# Patient Record
Sex: MALE | Race: WHITE | NOT HISPANIC OR LATINO | Employment: FULL TIME | URBAN - NONMETROPOLITAN AREA
[De-identification: names, ages, dates, MRNs, and addresses within clinical notes are randomized per-mention and may not be internally consistent; named-entity substitution may affect disease eponyms.]

---

## 2017-08-28 DIAGNOSIS — E29.1 MALE HYPOGONADISM: ICD-10-CM

## 2017-08-28 DIAGNOSIS — Z12.5 SCREENING PSA (PROSTATE SPECIFIC ANTIGEN): ICD-10-CM

## 2017-08-28 DIAGNOSIS — E78.2 MIXED HYPERLIPIDEMIA: ICD-10-CM

## 2017-08-28 DIAGNOSIS — R53.82 CHRONIC FATIGUE: Primary | ICD-10-CM

## 2017-09-11 RX ORDER — IBUPROFEN 800 MG/1
TABLET ORAL
Qty: 90 TABLET | Refills: 1 | Status: SHIPPED | OUTPATIENT
Start: 2017-09-11 | End: 2018-03-26 | Stop reason: CLARIF

## 2017-10-02 ENCOUNTER — APPOINTMENT (OUTPATIENT)
Dept: GENERAL RADIOLOGY | Facility: HOSPITAL | Age: 52
End: 2017-10-02

## 2017-10-02 ENCOUNTER — HOSPITAL ENCOUNTER (EMERGENCY)
Facility: HOSPITAL | Age: 52
Discharge: HOME | End: 2017-10-02
Admitting: EMERGENCY MEDICINE

## 2017-10-02 VITALS
HEART RATE: 63 BPM | WEIGHT: 315 LBS | OXYGEN SATURATION: 95 % | TEMPERATURE: 98.3 F | SYSTOLIC BLOOD PRESSURE: 149 MMHG | DIASTOLIC BLOOD PRESSURE: 83 MMHG | HEIGHT: 78 IN | RESPIRATION RATE: 16 BRPM | BODY MASS INDEX: 36.45 KG/M2

## 2017-10-02 DIAGNOSIS — S93.401A SPRAIN OF RIGHT ANKLE, UNSPECIFIED LIGAMENT, INITIAL ENCOUNTER: Primary | ICD-10-CM

## 2017-10-02 PROCEDURE — 25010000002 DIPHENHYDRAMINE PER 50 MG: Performed by: PHYSICIAN ASSISTANT

## 2017-10-02 PROCEDURE — 73610 X-RAY EXAM OF ANKLE: CPT

## 2017-10-02 PROCEDURE — 25010000002 METHYLPREDNISOLONE PER 125 MG: Performed by: PHYSICIAN ASSISTANT

## 2017-10-02 PROCEDURE — 73630 X-RAY EXAM OF FOOT: CPT

## 2017-10-02 PROCEDURE — 96372 THER/PROPH/DIAG INJ SC/IM: CPT

## 2017-10-02 PROCEDURE — 72110 X-RAY EXAM L-2 SPINE 4/>VWS: CPT

## 2017-10-02 PROCEDURE — 99283 EMERGENCY DEPT VISIT LOW MDM: CPT

## 2017-10-02 PROCEDURE — 25010000002 KETOROLAC TROMETHAMINE PER 15 MG: Performed by: PHYSICIAN ASSISTANT

## 2017-10-02 PROCEDURE — 73562 X-RAY EXAM OF KNEE 3: CPT

## 2017-10-02 RX ORDER — KETOROLAC TROMETHAMINE 30 MG/ML
30 INJECTION, SOLUTION INTRAMUSCULAR; INTRAVENOUS ONCE
Status: COMPLETED | OUTPATIENT
Start: 2017-10-02 | End: 2017-10-02

## 2017-10-02 RX ORDER — METHYLPREDNISOLONE SODIUM SUCCINATE 125 MG/2ML
125 INJECTION, POWDER, LYOPHILIZED, FOR SOLUTION INTRAMUSCULAR; INTRAVENOUS ONCE
Status: COMPLETED | OUTPATIENT
Start: 2017-10-02 | End: 2017-10-02

## 2017-10-02 RX ORDER — DIPHENHYDRAMINE HYDROCHLORIDE 50 MG/ML
25 INJECTION INTRAMUSCULAR; INTRAVENOUS ONCE
Status: COMPLETED | OUTPATIENT
Start: 2017-10-02 | End: 2017-10-02

## 2017-10-02 RX ORDER — DICLOFENAC SODIUM 75 MG/1
75 TABLET, DELAYED RELEASE ORAL 2 TIMES DAILY
Qty: 14 TABLET | Refills: 0 | Status: ON HOLD | OUTPATIENT
Start: 2017-10-02 | End: 2019-11-25

## 2017-10-02 RX ADMIN — DIPHENHYDRAMINE HYDROCHLORIDE 25 MG: 50 INJECTION, SOLUTION INTRAMUSCULAR; INTRAVENOUS at 13:12

## 2017-10-02 RX ADMIN — METHYLPREDNISOLONE SODIUM SUCCINATE 125 MG: 125 INJECTION, POWDER, FOR SOLUTION INTRAMUSCULAR; INTRAVENOUS at 13:12

## 2017-10-02 RX ADMIN — KETOROLAC TROMETHAMINE 30 MG: 30 INJECTION, SOLUTION INTRAMUSCULAR at 11:23

## 2017-10-02 NOTE — ED PROVIDER NOTES
Subjective   History of Present Illness  52-year-old male presents following a fall.  The patient reports he stepped in a hole at work as experience right ankle, right knee and low back pain.  Denies loss range of motion or strength affected ankle but does not he has had significant swelling.  He has a history of torn ligament in the ankle before.  Review of Systems   All other systems reviewed and are negative.      History reviewed. No pertinent past medical history.    Allergies   Allergen Reactions   • Penicillins Shortness Of Breath   • Sulfa Antibiotics Hives   • Toradol [Ketorolac Tromethamine] Hives and Itching       History reviewed. No pertinent surgical history.    History reviewed. No pertinent family history.    Social History     Social History   • Marital status:      Spouse name: N/A   • Number of children: N/A   • Years of education: N/A     Social History Main Topics   • Smoking status: Former Smoker     Quit date: 10/2/2012   • Smokeless tobacco: None   • Alcohol use No   • Drug use: No   • Sexual activity: Not Asked     Other Topics Concern   • None     Social History Narrative   • None           Objective   Physical Exam   Constitutional: He is oriented to person, place, and time. He appears well-developed and well-nourished.   HENT:   Head: Normocephalic.   Eyes: Pupils are equal, round, and reactive to light.   Neck: Normal range of motion.   Cardiovascular: Normal rate and regular rhythm.    Pulmonary/Chest: Effort normal.   Abdominal: Soft.   Musculoskeletal:   Significant ankle swelling and tenderness to palpation while low back tenderness paraspinal muscles for extension and flexion of the knee with tenderness at the patella   Neurological: He is alert and oriented to person, place, and time.   Skin: Skin is warm.   Psychiatric: He has a normal mood and affect. His behavior is normal.   Nursing note and vitals reviewed.      Procedures         ED Course  ED Course   Value Comment By  Time   XR Spine Lumbar 4+ View (Reviewed) Luis Antonio Mccall PA-C 10/02 1243                  MDM  Number of Diagnoses or Management Options  Sprain of right ankle, unspecified ligament, initial encounter: new and requires workup  Diagnosis management comments: Reports improvement in pain but noted he was itching and felt warm all over after toradol.  Treated with benadryl and steroids which resolved this reaction. Will dc in stable condition and offer strong return precautions        Amount and/or Complexity of Data Reviewed  Tests in the radiology section of CPT®: reviewed and ordered    Risk of Complications, Morbidity, and/or Mortality  Presenting problems: moderate  Diagnostic procedures: moderate  Management options: moderate    Patient Progress  Patient progress: improved      Final diagnoses:   Sprain of right ankle, unspecified ligament, initial encounter            Luis Antonio Mccall PA-C  10/02/17 1435

## 2017-10-02 NOTE — ED NOTES
Patient reports itching to body in general. Patient moved back to intake room 14 and assessed. Red itchy bumps to patient torso and upper extremities. Luis Antonio notified. Orders received. Toradol added to patients allergy lisMatt Calvert RN  10/02/17 7042

## 2018-02-22 ENCOUNTER — OFFICE VISIT (OUTPATIENT)
Dept: INTERNAL MEDICINE | Age: 53
End: 2018-02-22
Payer: COMMERCIAL

## 2018-02-22 VITALS
SYSTOLIC BLOOD PRESSURE: 132 MMHG | OXYGEN SATURATION: 98 % | BODY MASS INDEX: 36.45 KG/M2 | HEIGHT: 78 IN | WEIGHT: 315 LBS | DIASTOLIC BLOOD PRESSURE: 80 MMHG | HEART RATE: 98 BPM

## 2018-02-22 DIAGNOSIS — Z23 NEED FOR TDAP VACCINATION: Primary | ICD-10-CM

## 2018-02-22 DIAGNOSIS — M19.079 OSTEOARTHRITIS OF ANKLE, UNSPECIFIED LATERALITY, UNSPECIFIED OSTEOARTHRITIS TYPE: ICD-10-CM

## 2018-02-22 DIAGNOSIS — E78.00 HYPERCHOLESTEREMIA: ICD-10-CM

## 2018-02-22 DIAGNOSIS — E11.9 TYPE 2 DIABETES MELLITUS WITHOUT COMPLICATION, WITH LONG-TERM CURRENT USE OF INSULIN (HCC): ICD-10-CM

## 2018-02-22 DIAGNOSIS — Z23 NEED FOR PNEUMOCOCCAL VACCINE: ICD-10-CM

## 2018-02-22 DIAGNOSIS — Z79.4 TYPE 2 DIABETES MELLITUS WITHOUT COMPLICATION, WITH LONG-TERM CURRENT USE OF INSULIN (HCC): ICD-10-CM

## 2018-02-22 DIAGNOSIS — E66.01 MORBID OBESITY DUE TO EXCESS CALORIES (HCC): ICD-10-CM

## 2018-02-22 PROCEDURE — 90472 IMMUNIZATION ADMIN EACH ADD: CPT | Performed by: INTERNAL MEDICINE

## 2018-02-22 PROCEDURE — 90670 PCV13 VACCINE IM: CPT | Performed by: INTERNAL MEDICINE

## 2018-02-22 PROCEDURE — 99215 OFFICE O/P EST HI 40 MIN: CPT | Performed by: INTERNAL MEDICINE

## 2018-02-22 PROCEDURE — 90471 IMMUNIZATION ADMIN: CPT | Performed by: INTERNAL MEDICINE

## 2018-02-22 PROCEDURE — 90715 TDAP VACCINE 7 YRS/> IM: CPT | Performed by: INTERNAL MEDICINE

## 2018-02-22 RX ORDER — RIVAROXABAN 10 MG/1
TABLET, FILM COATED ORAL
Refills: 0 | COMMUNITY
Start: 2018-02-02 | End: 2018-03-26 | Stop reason: CLARIF

## 2018-02-22 RX ORDER — HYDROCODONE BITARTRATE AND ACETAMINOPHEN 7.5; 325 MG/1; MG/1
TABLET ORAL
COMMUNITY
Start: 2018-01-18 | End: 2018-03-26 | Stop reason: CLARIF

## 2018-02-22 RX ORDER — CARISOPRODOL 350 MG/1
TABLET ORAL
Refills: 0 | COMMUNITY
Start: 2017-12-15 | End: 2018-03-26 | Stop reason: CLARIF

## 2018-02-22 ASSESSMENT — PATIENT HEALTH QUESTIONNAIRE - PHQ9
SUM OF ALL RESPONSES TO PHQ9 QUESTIONS 1 & 2: 0
SUM OF ALL RESPONSES TO PHQ QUESTIONS 1-9: 0
1. LITTLE INTEREST OR PLEASURE IN DOING THINGS: 0
2. FEELING DOWN, DEPRESSED OR HOPELESS: 0

## 2018-02-22 ASSESSMENT — ENCOUNTER SYMPTOMS
VOMITING: 0
BACK PAIN: 0
TROUBLE SWALLOWING: 0
ABDOMINAL PAIN: 0
SHORTNESS OF BREATH: 0
ABDOMINAL DISTENTION: 0
SINUS PRESSURE: 0
EYE ITCHING: 0
BLOOD IN STOOL: 0
WHEEZING: 0
SORE THROAT: 0
EYE DISCHARGE: 0
NAUSEA: 0

## 2018-02-22 NOTE — PROGRESS NOTES
in stool, nausea and vomiting. Endocrine: Negative for cold intolerance, heat intolerance and polydipsia. Genitourinary: Negative for flank pain, frequency, hematuria and urgency. Musculoskeletal: Negative for arthralgias, back pain and joint swelling. Skin: Negative for rash and wound. Allergic/Immunologic: Negative for environmental allergies and food allergies. Neurological: Negative for dizziness, tremors, syncope, weakness, numbness and headaches. Hematological: Negative for adenopathy. Psychiatric/Behavioral: Negative for agitation and hallucinations. The patient is not nervous/anxious. Objective:     /80   Pulse 98   Ht 6' 7\" (2.007 m)   Wt (!) 400 lb (181.4 kg)   SpO2 98%   BMI 45.06 kg/m²    Physical Exam   Constitutional: He is oriented to person, place, and time. He appears well-developed and well-nourished. No distress. HENT:   Head: Normocephalic and atraumatic. Right Ear: External ear normal.   Left Ear: External ear normal.   Nose: Nose normal.   Mouth/Throat: Oropharynx is clear and moist. No oropharyngeal exudate. Eyes: Conjunctivae and EOM are normal. Pupils are equal, round, and reactive to light. Right eye exhibits no discharge. Left eye exhibits no discharge. No scleral icterus. Neck: Normal range of motion. Neck supple. No JVD present. No tracheal deviation present. No thyromegaly present. Cardiovascular: Normal rate, regular rhythm, normal heart sounds and intact distal pulses. Exam reveals no gallop and no friction rub. No murmur heard. Pulmonary/Chest: Effort normal and breath sounds normal. No respiratory distress. He has no wheezes. He has no rales. Abdominal: Soft. Bowel sounds are normal. He exhibits no distension and no mass. There is no tenderness. There is no rebound and no guarding. Musculoskeletal: Normal range of motion. He exhibits no edema, tenderness or deformity. Lymphadenopathy:     He has no cervical adenopathy. Neurological: He is alert and oriented to person, place, and time. He has normal reflexes. No cranial nerve deficit. He exhibits normal muscle tone. Coordination normal.   Skin: Skin is warm and dry. No rash noted. He is not diaphoretic. No erythema. No pallor. Psychiatric: He has a normal mood and affect. His behavior is normal. Judgment and thought content normal.        Assessment:     1. Morbid obesity due to excess calories ([address]     2. Hypercholesteremia     3. Osteoarthritis of ankle, unspecified laterality, unspecified osteoarthritis type     4. Type 2 diabetes mellitus without complication, with long-term current use of insulin ([facility])  Hemoglobin A1C            Plan:      Obesity which is related to continue increase caloric intake he's drinking real soda doesn't really watch his diet and his blood sugars elevated so were going to get him to in to see Angelika Parmar and do some diabetic education and work on his weight. Hypercholesterol. He's not a goal here either and will need some dietary supplementation. He's not been able to exercise because of his ankle injury and once that is healed I think he can be more active. Osteoarthritis of the ankle after tear which is under Dr. French Jump care and is healing nicely. Type II diabetes mellitus which is not optimally controlled. I'm going to put him on diet and exercise regimen and sees a referral to Angelika Parmar and let her go over things with him and then I'll see him back in 3 months with a repeat A1c. He's going to get his Prevnar and a tetanus today and I'll see him in 3 months. Return in about 3 months (around 5/22/2018) for diabetes check, LAB 1 WEEK BEFORE APPOINTMENT. Patient given educational materials - see patient instructions. Discussed use, benefit, and side effects of prescribed medications. All patient questions answered. Pt voiced understanding. Reviewed health maintenance. Instructed to continue current medications, diet and exercise.

## 2018-02-22 NOTE — PROGRESS NOTES
Visit Information    Have you changed or started any medications since your last visit including any over-the-counter medicines, vitamins, or herbal medicines? no   Are you having any side effects from any of your medications? -  no  Have you stopped taking any of your medications? Is so, why? -  no    Have you seen any other physician or provider since your last visit? No  Have you had any other diagnostic tests since your last visit? No  Have you been seen in the emergency room and/or had an admission to a hospital since we last saw you? No  Have you had your routine dental cleaning in the past 6 months? no    Have you activated your [employer] account? If not, what are your barriers?  No: ref     Patient Care Team:  Dillon Campoverde MD as PCP - General (Internal Medicine)    Medical History Review  Past Medical, Family, and Social History reviewed and does not contribute to the patient presenting condition    Health Maintenance   Topic Date Due    HIV screen  07/02/1982    DTaP/Tdap/Td vaccine (1 - Tdap) 07/02/1986    Lipid screen  07/02/2007    Diabetes screen  07/02/2007    Shingles Vaccine (1 of 2 - 2 Dose Series) 07/02/2017    Colon cancer screen colonoscopy  07/02/2017    Flu vaccine (1) 09/01/2017

## 2018-03-26 ENCOUNTER — OFFICE VISIT (OUTPATIENT)
Dept: INTERNAL MEDICINE | Age: 53
End: 2018-03-26
Payer: COMMERCIAL

## 2018-03-26 VITALS
TEMPERATURE: 99.1 F | BODY MASS INDEX: 42.81 KG/M2 | WEIGHT: 315 LBS | SYSTOLIC BLOOD PRESSURE: 126 MMHG | DIASTOLIC BLOOD PRESSURE: 82 MMHG | HEART RATE: 81 BPM | OXYGEN SATURATION: 97 %

## 2018-03-26 DIAGNOSIS — J32.9 SINUSITIS, UNSPECIFIED CHRONICITY, UNSPECIFIED LOCATION: Primary | ICD-10-CM

## 2018-03-26 PROCEDURE — 99213 OFFICE O/P EST LOW 20 MIN: CPT | Performed by: INTERNAL MEDICINE

## 2018-03-26 RX ORDER — AZITHROMYCIN 1 G
1 PACKET (EA) ORAL ONCE
Qty: 1 PACKAGE | Refills: 0 | Status: SHIPPED | OUTPATIENT
Start: 2018-03-26 | End: 2018-03-26

## 2018-03-26 RX ORDER — BENZONATATE 200 MG/1
200 CAPSULE ORAL 3 TIMES DAILY PRN
Qty: 30 CAPSULE | Refills: 0 | Status: SHIPPED | OUTPATIENT
Start: 2018-03-26 | End: 2018-04-05

## 2018-03-26 RX ORDER — METHYLPREDNISOLONE 4 MG/1
TABLET ORAL
Qty: 1 KIT | Refills: 0 | Status: SHIPPED | OUTPATIENT
Start: 2018-03-26 | End: 2018-04-01

## 2018-03-26 ASSESSMENT — ENCOUNTER SYMPTOMS
ABDOMINAL PAIN: 0
EYE ITCHING: 0
NAUSEA: 0
ABDOMINAL DISTENTION: 0
SHORTNESS OF BREATH: 0
VOMITING: 0
SINUS PRESSURE: 1
EYE DISCHARGE: 0
COUGH: 1
SORE THROAT: 0
BLOOD IN STOOL: 0
TROUBLE SWALLOWING: 0
SINUS PAIN: 1
BACK PAIN: 0
WHEEZING: 0

## 2018-03-26 NOTE — PROGRESS NOTES
Visit Information    Have you changed or started any medications since your last visit including any over-the-counter medicines, vitamins, or herbal medicines? no   Are you having any side effects from any of your medications? -  no  Have you stopped taking any of your medications? Is so, why? -  yes - PER DR    Have you seen any other physician or provider since your last visit? Yes - Records Obtained  Have you had any other diagnostic tests since your last visit? No  Have you been seen in the emergency room and/or had an admission to a hospital since we last saw you? No  Have you had your routine dental cleaning in the past 6 months? no    Have you activated your [employer] account? If not, what are your barriers?  No: DECLINED     Patient Care Team:  Tabitha Don MD as PCP - General (Internal Medicine)    Medical History Review  Past Medical, Family, and Social History reviewed and does not contribute to the patient presenting condition    Health Maintenance   Topic Date Due    Diabetic foot exam  07/02/1977    Diabetic retinal exam  07/02/1977    HIV screen  07/02/1982    Diabetic microalbuminuria test  07/02/1985    Pneumococcal med risk (1 of 1 - PPSV23) 07/02/1986    Shingles Vaccine (1 of 2 - 2 Dose Series) 07/02/2017    Colon cancer screen colonoscopy  07/02/2017    Flu vaccine (1) 09/01/2017    A1C test (Diabetic or Prediabetic)  02/16/2019    Lipid screen  02/16/2019    DTaP/Tdap/Td vaccine (2 - Td) 02/22/2028
Negative for flank pain, frequency, hematuria and urgency. Musculoskeletal: Negative for arthralgias, back pain and joint swelling. Skin: Negative for rash and wound. Allergic/Immunologic: Negative for environmental allergies and food allergies. Neurological: Negative for dizziness, tremors, syncope, weakness, numbness and headaches. Hematological: Negative for adenopathy. Psychiatric/Behavioral: Negative for agitation and hallucinations. The patient is not nervous/anxious. Objective:     /82   Pulse 81   Temp 99.1 °F (37.3 °C)   Wt (!) 380 lb (172.4 kg)   SpO2 97%   BMI 42.81 kg/m²    Physical Exam   Constitutional: He is oriented to person, place, and time. He appears well-developed and well-nourished. No distress. HENT:   Head: Normocephalic and atraumatic. Right Ear: External ear normal.   Left Ear: External ear normal.   Nose: Nose normal.   Mouth/Throat: Oropharynx is clear and moist. No oropharyngeal exudate. Eyes: Conjunctivae and EOM are normal. Pupils are equal, round, and reactive to light. Right eye exhibits no discharge. Left eye exhibits no discharge. No scleral icterus. Neck: Normal range of motion. Neck supple. No JVD present. No tracheal deviation present. No thyromegaly present. Cardiovascular: Normal rate, regular rhythm, normal heart sounds and intact distal pulses. Exam reveals no gallop and no friction rub. No murmur heard. Pulmonary/Chest: Effort normal and breath sounds normal. No respiratory distress. He has no wheezes. He has no rales. Abdominal: Soft. Bowel sounds are normal. He exhibits no distension and no mass. There is no tenderness. There is no rebound and no guarding. Musculoskeletal: Normal range of motion. He exhibits no edema, tenderness or deformity. Lymphadenopathy:     He has no cervical adenopathy. Neurological: He is alert and oriented to person, place, and time. He has normal reflexes. No cranial nerve deficit.  He exhibits

## 2018-05-18 DIAGNOSIS — E78.2 MIXED HYPERLIPIDEMIA: ICD-10-CM

## 2018-05-18 DIAGNOSIS — E11.9 TYPE 2 DIABETES MELLITUS WITHOUT COMPLICATION, WITH LONG-TERM CURRENT USE OF INSULIN (HCC): ICD-10-CM

## 2018-05-18 DIAGNOSIS — Z12.5 SCREENING PSA (PROSTATE SPECIFIC ANTIGEN): ICD-10-CM

## 2018-05-18 DIAGNOSIS — R53.82 CHRONIC FATIGUE: ICD-10-CM

## 2018-05-18 DIAGNOSIS — Z79.4 TYPE 2 DIABETES MELLITUS WITHOUT COMPLICATION, WITH LONG-TERM CURRENT USE OF INSULIN (HCC): ICD-10-CM

## 2018-05-18 LAB
ALBUMIN SERPL-MCNC: 4.4 G/DL (ref 3.5–5.2)
ALP BLD-CCNC: 56 U/L (ref 40–130)
ALT SERPL-CCNC: 33 U/L (ref 5–41)
ANION GAP SERPL CALCULATED.3IONS-SCNC: 12 MMOL/L (ref 7–19)
AST SERPL-CCNC: 24 U/L (ref 5–40)
BASOPHILS ABSOLUTE: 0 K/UL (ref 0–0.2)
BASOPHILS RELATIVE PERCENT: 0.5 % (ref 0–1)
BILIRUB SERPL-MCNC: 0.5 MG/DL (ref 0.2–1.2)
BUN BLDV-MCNC: 15 MG/DL (ref 6–20)
CALCIUM SERPL-MCNC: 9.4 MG/DL (ref 8.6–10)
CHLORIDE BLD-SCNC: 100 MMOL/L (ref 98–111)
CHOLESTEROL, TOTAL: 182 MG/DL (ref 160–199)
CO2: 30 MMOL/L (ref 22–29)
CREAT SERPL-MCNC: 1 MG/DL (ref 0.5–1.2)
EOSINOPHILS ABSOLUTE: 0.1 K/UL (ref 0–0.6)
EOSINOPHILS RELATIVE PERCENT: 1 % (ref 0–5)
GFR NON-AFRICAN AMERICAN: >60
GLUCOSE BLD-MCNC: 97 MG/DL (ref 74–109)
HBA1C MFR BLD: 5.2 %
HCT VFR BLD CALC: 44.1 % (ref 42–52)
HDLC SERPL-MCNC: 42 MG/DL (ref 55–121)
HEMOGLOBIN: 15.1 G/DL (ref 14–18)
LDL CHOLESTEROL CALCULATED: 112 MG/DL
LYMPHOCYTES ABSOLUTE: 2 K/UL (ref 1.1–4.5)
LYMPHOCYTES RELATIVE PERCENT: 34.1 % (ref 20–40)
MCH RBC QN AUTO: 31.2 PG (ref 27–31)
MCHC RBC AUTO-ENTMCNC: 34.2 G/DL (ref 33–37)
MCV RBC AUTO: 91.1 FL (ref 80–94)
MONOCYTES ABSOLUTE: 0.5 K/UL (ref 0–0.9)
MONOCYTES RELATIVE PERCENT: 9.2 % (ref 0–10)
NEUTROPHILS ABSOLUTE: 3.2 K/UL (ref 1.5–7.5)
NEUTROPHILS RELATIVE PERCENT: 54.9 % (ref 50–65)
PDW BLD-RTO: 12.9 % (ref 11.5–14.5)
PLATELET # BLD: 206 K/UL (ref 130–400)
PMV BLD AUTO: 10.2 FL (ref 9.4–12.4)
POTASSIUM SERPL-SCNC: 4.1 MMOL/L (ref 3.5–5)
PROSTATE SPECIFIC ANTIGEN: 2.91 NG/ML (ref 0–4)
RBC # BLD: 4.84 M/UL (ref 4.7–6.1)
SODIUM BLD-SCNC: 142 MMOL/L (ref 136–145)
TOTAL PROTEIN: 7.1 G/DL (ref 6.6–8.7)
TRIGL SERPL-MCNC: 139 MG/DL (ref 0–149)
WBC # BLD: 5.9 K/UL (ref 4.8–10.8)

## 2018-05-29 ENCOUNTER — OFFICE VISIT (OUTPATIENT)
Dept: INTERNAL MEDICINE | Age: 53
End: 2018-05-29
Payer: COMMERCIAL

## 2018-05-29 VITALS
HEART RATE: 78 BPM | WEIGHT: 315 LBS | DIASTOLIC BLOOD PRESSURE: 84 MMHG | SYSTOLIC BLOOD PRESSURE: 138 MMHG | OXYGEN SATURATION: 98 % | HEIGHT: 78 IN | BODY MASS INDEX: 36.45 KG/M2

## 2018-05-29 DIAGNOSIS — E11.9 TYPE 2 DIABETES MELLITUS WITHOUT COMPLICATION, WITH LONG-TERM CURRENT USE OF INSULIN (HCC): Primary | ICD-10-CM

## 2018-05-29 DIAGNOSIS — E66.01 MORBID OBESITY DUE TO EXCESS CALORIES (HCC): ICD-10-CM

## 2018-05-29 DIAGNOSIS — Z79.4 TYPE 2 DIABETES MELLITUS WITHOUT COMPLICATION, WITH LONG-TERM CURRENT USE OF INSULIN (HCC): Primary | ICD-10-CM

## 2018-05-29 DIAGNOSIS — E78.00 HYPERCHOLESTEREMIA: ICD-10-CM

## 2018-05-29 PROCEDURE — 99214 OFFICE O/P EST MOD 30 MIN: CPT | Performed by: INTERNAL MEDICINE

## 2018-05-29 RX ORDER — IBUPROFEN 800 MG/1
TABLET ORAL
Refills: 1 | COMMUNITY
Start: 2018-05-17 | End: 2018-10-01

## 2018-05-29 RX ORDER — HYDROCODONE BITARTRATE AND ACETAMINOPHEN 7.5; 325 MG/1; MG/1
TABLET ORAL
Refills: 0 | COMMUNITY
Start: 2018-05-22 | End: 2018-10-01

## 2018-05-29 RX ORDER — TADALAFIL 5 MG/1
5 TABLET ORAL PRN
COMMUNITY
End: 2019-07-15

## 2018-05-29 RX ORDER — CLINDAMYCIN HYDROCHLORIDE 300 MG/1
CAPSULE ORAL
Refills: 0 | COMMUNITY
Start: 2018-05-22 | End: 2018-10-01

## 2018-05-29 ASSESSMENT — ENCOUNTER SYMPTOMS
SINUS PRESSURE: 0
BACK PAIN: 0
EYE DISCHARGE: 0
WHEEZING: 0
ABDOMINAL PAIN: 0
BLOOD IN STOOL: 0
VOMITING: 0
ABDOMINAL DISTENTION: 0
SORE THROAT: 0
SHORTNESS OF BREATH: 0
TROUBLE SWALLOWING: 0
NAUSEA: 0
EYE ITCHING: 0

## 2018-09-27 DIAGNOSIS — Z79.4 TYPE 2 DIABETES MELLITUS WITHOUT COMPLICATION, WITH LONG-TERM CURRENT USE OF INSULIN (HCC): ICD-10-CM

## 2018-09-27 DIAGNOSIS — E11.9 TYPE 2 DIABETES MELLITUS WITHOUT COMPLICATION, WITH LONG-TERM CURRENT USE OF INSULIN (HCC): ICD-10-CM

## 2018-09-27 LAB — HBA1C MFR BLD: 10.2 % (ref 4–6)

## 2018-10-01 ENCOUNTER — OFFICE VISIT (OUTPATIENT)
Dept: INTERNAL MEDICINE | Age: 53
End: 2018-10-01
Payer: COMMERCIAL

## 2018-10-01 VITALS
DIASTOLIC BLOOD PRESSURE: 84 MMHG | OXYGEN SATURATION: 99 % | HEART RATE: 72 BPM | WEIGHT: 315 LBS | HEIGHT: 78 IN | SYSTOLIC BLOOD PRESSURE: 122 MMHG | BODY MASS INDEX: 36.45 KG/M2

## 2018-10-01 DIAGNOSIS — E11.9 TYPE 2 DIABETES MELLITUS WITHOUT COMPLICATION, WITH LONG-TERM CURRENT USE OF INSULIN (HCC): Primary | ICD-10-CM

## 2018-10-01 DIAGNOSIS — M19.079 OSTEOARTHRITIS OF ANKLE, UNSPECIFIED LATERALITY, UNSPECIFIED OSTEOARTHRITIS TYPE: ICD-10-CM

## 2018-10-01 DIAGNOSIS — Z79.4 TYPE 2 DIABETES MELLITUS WITHOUT COMPLICATION, WITH LONG-TERM CURRENT USE OF INSULIN (HCC): Primary | ICD-10-CM

## 2018-10-01 DIAGNOSIS — E78.00 HYPERCHOLESTEREMIA: ICD-10-CM

## 2018-10-01 PROCEDURE — 99213 OFFICE O/P EST LOW 20 MIN: CPT | Performed by: INTERNAL MEDICINE

## 2018-10-01 RX ORDER — MELOXICAM 15 MG/1
15 TABLET ORAL DAILY
COMMUNITY
End: 2019-04-05 | Stop reason: SDUPTHER

## 2018-10-01 ASSESSMENT — ENCOUNTER SYMPTOMS
VOMITING: 0
SORE THROAT: 0
ABDOMINAL DISTENTION: 0
SINUS PRESSURE: 0
WHEEZING: 0
EYE ITCHING: 0
TROUBLE SWALLOWING: 0
BACK PAIN: 0
EYE DISCHARGE: 0
NAUSEA: 0
BLOOD IN STOOL: 0
SHORTNESS OF BREATH: 0
ABDOMINAL PAIN: 0

## 2018-10-01 NOTE — PROGRESS NOTES
nerve deficit. He exhibits normal muscle tone. Coordination normal.   Skin: Skin is warm and dry. No rash noted. He is not diaphoretic. No erythema. No pallor. Psychiatric: He has a normal mood and affect. His behavior is normal. Judgment and thought content normal.        Assessment:      Diagnosis Orders   1. Type 2 diabetes mellitus without complication, with long-term current use of insulin ([facility])  Comprehensive Metabolic Panel    Hemoglobin A1C   2. Hypercholesteremia     3. Osteoarthritis of ankle, unspecified laterality, unspecified osteoarthritis type              Plan:     Type II diabetes mellitus which is poorly controlled with an A1c of 10.2. I'm getting him back on his diet and I'm going to start him on metformin 500 mg a day and we will recheck him again in 2 months. I've reiterated the importance of dietary compliance and he says that they've gotten rid of all the sweets including breads potatoes and cookies and candy out of the house. Osteoarthritis of the ankle which still gives exhibits periods are still low will edema to it but it's getting better. I've told him that his healing will be improved by getting his glucose down. Overall his sugars terrible. Were going to get him on the medication and the Dyazide as above and I'll see him back in recheck in 2 months. Return for Keep scheduled appointment. .     Patient given educational materials - see patient instructions. Discussed use, benefit, and side effects of prescribed medications. All patient questions answered. Pt voiced understanding. Reviewed health maintenance. Instructed to continue current medications, diet and exercise. Patient agreed with treatment plan. **This report has been created using voice recognition software. It may contain minor errors which are inherent in voice recognition technology. **    Electronically signed by Tracy Ward MD on 10/1/2018 at 4:26 PM

## 2018-11-26 DIAGNOSIS — Z79.4 TYPE 2 DIABETES MELLITUS WITHOUT COMPLICATION, WITH LONG-TERM CURRENT USE OF INSULIN (HCC): ICD-10-CM

## 2018-11-26 DIAGNOSIS — E11.9 TYPE 2 DIABETES MELLITUS WITHOUT COMPLICATION, WITH LONG-TERM CURRENT USE OF INSULIN (HCC): ICD-10-CM

## 2018-11-26 LAB
ALBUMIN SERPL-MCNC: 4.2 G/DL (ref 3.5–5.2)
ALP BLD-CCNC: 67 U/L (ref 40–130)
ALT SERPL-CCNC: 53 U/L (ref 5–41)
ANION GAP SERPL CALCULATED.3IONS-SCNC: 10 MMOL/L (ref 7–19)
AST SERPL-CCNC: 42 U/L (ref 5–40)
BILIRUB SERPL-MCNC: 0.6 MG/DL (ref 0.2–1.2)
BUN BLDV-MCNC: 11 MG/DL (ref 6–20)
CALCIUM SERPL-MCNC: 9.5 MG/DL (ref 8.6–10)
CHLORIDE BLD-SCNC: 101 MMOL/L (ref 98–111)
CO2: 28 MMOL/L (ref 22–29)
CREAT SERPL-MCNC: 1 MG/DL (ref 0.5–1.2)
GFR NON-AFRICAN AMERICAN: >60
GLUCOSE BLD-MCNC: 147 MG/DL (ref 74–109)
HBA1C MFR BLD: 7.9 % (ref 4–6)
POTASSIUM SERPL-SCNC: 4 MMOL/L (ref 3.5–5)
SODIUM BLD-SCNC: 139 MMOL/L (ref 136–145)
TOTAL PROTEIN: 7.1 G/DL (ref 6.6–8.7)

## 2018-12-03 ENCOUNTER — OFFICE VISIT (OUTPATIENT)
Dept: INTERNAL MEDICINE | Age: 53
End: 2018-12-03
Payer: COMMERCIAL

## 2018-12-03 VITALS
WEIGHT: 315 LBS | SYSTOLIC BLOOD PRESSURE: 142 MMHG | HEART RATE: 69 BPM | OXYGEN SATURATION: 98 % | BODY MASS INDEX: 43.26 KG/M2 | DIASTOLIC BLOOD PRESSURE: 82 MMHG

## 2018-12-03 DIAGNOSIS — Z23 NEED FOR PNEUMOCOCCAL VACCINE: ICD-10-CM

## 2018-12-03 DIAGNOSIS — Z79.4 TYPE 2 DIABETES MELLITUS WITHOUT COMPLICATION, WITH LONG-TERM CURRENT USE OF INSULIN (HCC): Primary | ICD-10-CM

## 2018-12-03 DIAGNOSIS — E11.9 TYPE 2 DIABETES MELLITUS WITHOUT COMPLICATION, WITH LONG-TERM CURRENT USE OF INSULIN (HCC): Primary | ICD-10-CM

## 2018-12-03 PROCEDURE — 90732 PPSV23 VACC 2 YRS+ SUBQ/IM: CPT | Performed by: INTERNAL MEDICINE

## 2018-12-03 PROCEDURE — 90471 IMMUNIZATION ADMIN: CPT | Performed by: INTERNAL MEDICINE

## 2018-12-03 PROCEDURE — 99213 OFFICE O/P EST LOW 20 MIN: CPT | Performed by: INTERNAL MEDICINE

## 2018-12-03 ASSESSMENT — ENCOUNTER SYMPTOMS
EYE DISCHARGE: 0
EYE ITCHING: 0
NAUSEA: 0
ABDOMINAL PAIN: 0
TROUBLE SWALLOWING: 0
SINUS PRESSURE: 0
BLOOD IN STOOL: 0
WHEEZING: 0
SORE THROAT: 0
ABDOMINAL DISTENTION: 0
SHORTNESS OF BREATH: 0
VOMITING: 0
BACK PAIN: 0

## 2019-04-01 DIAGNOSIS — Z23 NEED FOR PNEUMOCOCCAL VACCINE: ICD-10-CM

## 2019-04-01 DIAGNOSIS — Z79.4 TYPE 2 DIABETES MELLITUS WITHOUT COMPLICATION, WITH LONG-TERM CURRENT USE OF INSULIN (HCC): ICD-10-CM

## 2019-04-01 DIAGNOSIS — E11.9 TYPE 2 DIABETES MELLITUS WITHOUT COMPLICATION, WITH LONG-TERM CURRENT USE OF INSULIN (HCC): ICD-10-CM

## 2019-04-01 LAB
ALBUMIN SERPL-MCNC: 4.1 G/DL (ref 3.5–5.2)
ALP BLD-CCNC: 69 U/L (ref 40–130)
ALT SERPL-CCNC: 50 U/L (ref 5–41)
ANION GAP SERPL CALCULATED.3IONS-SCNC: 13 MMOL/L (ref 7–19)
AST SERPL-CCNC: 30 U/L (ref 5–40)
BILIRUB SERPL-MCNC: 0.5 MG/DL (ref 0.2–1.2)
BUN BLDV-MCNC: 10 MG/DL (ref 6–20)
CALCIUM SERPL-MCNC: 9.5 MG/DL (ref 8.6–10)
CHLORIDE BLD-SCNC: 98 MMOL/L (ref 98–111)
CO2: 28 MMOL/L (ref 22–29)
CREAT SERPL-MCNC: 0.9 MG/DL (ref 0.5–1.2)
GFR NON-AFRICAN AMERICAN: >60
GLUCOSE BLD-MCNC: 163 MG/DL (ref 74–109)
HBA1C MFR BLD: 7.8 % (ref 4–6)
POTASSIUM SERPL-SCNC: 4.3 MMOL/L (ref 3.5–5)
SODIUM BLD-SCNC: 139 MMOL/L (ref 136–145)
TOTAL PROTEIN: 7.3 G/DL (ref 6.6–8.7)

## 2019-04-05 ENCOUNTER — OFFICE VISIT (OUTPATIENT)
Dept: INTERNAL MEDICINE | Age: 54
End: 2019-04-05
Payer: COMMERCIAL

## 2019-04-05 VITALS
HEIGHT: 78 IN | BODY MASS INDEX: 36.45 KG/M2 | DIASTOLIC BLOOD PRESSURE: 70 MMHG | OXYGEN SATURATION: 97 % | HEART RATE: 66 BPM | WEIGHT: 315 LBS | SYSTOLIC BLOOD PRESSURE: 138 MMHG

## 2019-04-05 DIAGNOSIS — Z79.4 TYPE 2 DIABETES MELLITUS WITHOUT COMPLICATION, WITH LONG-TERM CURRENT USE OF INSULIN (HCC): Primary | ICD-10-CM

## 2019-04-05 DIAGNOSIS — E66.01 MORBID OBESITY DUE TO EXCESS CALORIES (HCC): ICD-10-CM

## 2019-04-05 DIAGNOSIS — E78.00 HYPERCHOLESTEREMIA: ICD-10-CM

## 2019-04-05 DIAGNOSIS — E11.9 TYPE 2 DIABETES MELLITUS WITHOUT COMPLICATION, WITH LONG-TERM CURRENT USE OF INSULIN (HCC): Primary | ICD-10-CM

## 2019-04-05 PROCEDURE — 99214 OFFICE O/P EST MOD 30 MIN: CPT | Performed by: INTERNAL MEDICINE

## 2019-04-05 RX ORDER — MELOXICAM 15 MG/1
15 TABLET ORAL DAILY
Qty: 90 TABLET | Refills: 3 | Status: SHIPPED | OUTPATIENT
Start: 2019-04-05 | End: 2020-07-09

## 2019-04-05 ASSESSMENT — ENCOUNTER SYMPTOMS
BACK PAIN: 0
WHEEZING: 0
SORE THROAT: 0
TROUBLE SWALLOWING: 0
ABDOMINAL DISTENTION: 0
NAUSEA: 0
EYE DISCHARGE: 0
EYE ITCHING: 0
BLOOD IN STOOL: 0
VOMITING: 0
SINUS PRESSURE: 0
SHORTNESS OF BREATH: 0
ABDOMINAL PAIN: 0

## 2019-04-05 ASSESSMENT — PATIENT HEALTH QUESTIONNAIRE - PHQ9
2. FEELING DOWN, DEPRESSED OR HOPELESS: 0
SUM OF ALL RESPONSES TO PHQ QUESTIONS 1-9: 0
1. LITTLE INTEREST OR PLEASURE IN DOING THINGS: 0
SUM OF ALL RESPONSES TO PHQ QUESTIONS 1-9: 0
SUM OF ALL RESPONSES TO PHQ9 QUESTIONS 1 & 2: 0

## 2019-04-05 NOTE — PROGRESS NOTES
[facility]  [facility] [address]  Dept: [phone]  Dept Fax: [phone]: [phone]      Visit Date: 4/5/2019    Mirella Purvis a 48 y.o. male who presents today for:  Chief Complaint   Patient presents with    Diabetes     would like to get rx for mobic         HPI:     Dion Garcia is 48years old in for follow-up visit. He has obesity arthritis of the ankle and type II diabetes mellitus. He also has hypercholesterol. He had his A1c drawn and he's here for follow-up. He had ankle surgery done and he's finally getting more mobile but he wants a refill on his meloxicam.    Social History     Tobacco Use    Smoking status: Former Smoker    Smokeless tobacco: Never Used   Substance Use Topics    Alcohol use: No      Current Outpatient Medications   Medication Sig Dispense Refill    meloxicam (MOBIC) 15 MG tablet Take 1 tablet by mouth daily 90 tablet 3    metFORMIN (GLUCOPHAGE) 500 MG tablet Take 1 tablet by mouth every morning (before breakfast) 30 tablet 5    tadalafil (CIALIS) 5 MG tablet Take 5 mg by mouth as needed for Erectile Dysfunction       No current facility-administered medications for this visit. Allergies   Allergen Reactions    Penicillins Shortness Of Breath    Ketorolac Tromethamine Hives and Itching    Sulfa Antibiotics Hives         Subjective:      Review of Systems   Constitutional: Negative for activity change, appetite change, fatigue and fever. HENT: Negative for congestion, hearing loss, sinus pressure, sore throat and trouble swallowing. Eyes: Negative for discharge and itching. Respiratory: Negative for shortness of breath and wheezing. Cardiovascular: Negative for chest pain, palpitations and leg swelling. Gastrointestinal: Negative for abdominal distention, abdominal pain, blood in stool, nausea and vomiting.    Endocrine: Negative for cold intolerance, heat intolerance and polydipsia. Genitourinary: Negative for flank pain, frequency, hematuria and urgency. Musculoskeletal: Positive for arthralgias and myalgias. Negative for back pain and joint swelling. Skin: Negative for rash and wound. Allergic/Immunologic: Negative for environmental allergies and food allergies. Neurological: Negative for dizziness, tremors, syncope, weakness, numbness and headaches. Hematological: Negative for adenopathy. Psychiatric/Behavioral: Negative for agitation and hallucinations. The patient is not nervous/anxious. Objective:     /70   Pulse 66   Ht 6' 7\" (2.007 m)   Wt (!) 380 lb (172.4 kg)   SpO2 97%   BMI 42.81 kg/m²   Physical Exam   Constitutional: He is oriented to person, place, and time. He appears well-developed and well-nourished. No distress. HENT:   Head: Normocephalic and atraumatic. Right Ear: External ear normal.   Left Ear: External ear normal.   Nose: Nose normal.   Mouth/Throat: Oropharynx is clear and moist. No oropharyngeal exudate. Eyes: Pupils are equal, round, and reactive to light. Conjunctivae and EOM are normal. Right eye exhibits no discharge. Left eye exhibits no discharge. No scleral icterus. Neck: Normal range of motion. Neck supple. No JVD present. No tracheal deviation present. No thyromegaly present. Cardiovascular: Normal rate, regular rhythm, normal heart sounds and intact distal pulses. Exam reveals no gallop and no friction rub. No murmur heard. Pulmonary/Chest: Effort normal and breath sounds normal. No respiratory distress. He has no wheezes. He has no rales. Abdominal: Soft. Bowel sounds are normal. He exhibits no distension and no mass. There is no tenderness. There is no rebound and no guarding. Musculoskeletal: Normal range of motion. He exhibits no edema, tenderness or deformity. Lymphadenopathy:     He has no cervical adenopathy. Neurological: He is alert and oriented to person, place, and time.  He has normal reflexes. He displays normal reflexes. No cranial nerve deficit. He exhibits normal muscle tone. Coordination normal.   Skin: Skin is warm and dry. No rash noted. He is not diaphoretic. No erythema. No pallor. Psychiatric: He has a normal mood and affect. His behavior is normal. Judgment and thought content normal.        Assessment:      Diagnosis Orders   1. Type 2 diabetes mellitus without complication, with long-term current use of insulin ([facility])  CBC Auto Differential    Comprehensive Metabolic Panel    Hemoglobin A1C    Lipid Panel    Psa screening   2. Hypercholesteremia  CBC Auto Differential    Comprehensive Metabolic Panel    Hemoglobin A1C    Lipid Panel    Psa screening   3. Morbid obesity due to excess calories ([facility])  CBC Auto Differential    Comprehensive Metabolic Panel    Hemoglobin A1C    Lipid Panel    Psa screening            Plan:     2 diabetes mellitus which is under adequate control. He's not been as active with his 4 months of nonweightbearing because of his ankle surgery. Nonetheless his A1c looks pretty good he's gone from 7.9 to  7.8. Keep him on the same dose of metformin and encouraged him to try to exercise as much as he can allergies not on weightbearing. Hypercholesterol which will be due for testing when he comes back in 6 months. Arthritis of the ankle which is getting better. He wants a refill on his meloxicam a sodium intake and increase his activity. Overall he stable. We'll see him back again in 6 months refill his meloxicam and encourage him to exercise as much as possible. 25 minutes with him today 50% which is been counseling him on diet cardiovascular exercise risk for fall precautions and we'll see him back in 6 months. Return in about 6 months (around 10/5/2019) for blood pressure check, liver ,lipid check, diabetes check, yearly exam, LAB 1 WEEK BEFORE APPOINTMENT. Patient given educational materials- see patient instructions.   Discussed use, benefit, and side effects of prescribedmedications. All patient questions answered. Pt voiced understanding. Reviewedhealth maintenance. Instructed to continue current medications, diet and exercise. Patient agreed with treatment plan. **This report has been created usingvoice recognition software. It may contain minor errors which are inherent in voicerecognition technology. **    Electronically signed by Sahra Bueno MD on 4/5/2019 at 3:32 PM

## 2019-06-25 NOTE — TELEPHONE ENCOUNTER
Thelma Haddad called requesting a refill of the below medication which has been pended for you:     Requested Prescriptions     Pending Prescriptions Disp Refills    metFORMIN (GLUCOPHAGE) 500 MG tablet 30 tablet 5     Sig: Take 1 tablet by mouth every morning (before breakfast)       Last Appointment Date: 4/5/2019  Next Appointment Date: 8/6/2019    Allergies   Allergen Reactions    Penicillins Shortness Of Breath    Ketorolac Tromethamine Hives and Itching    Sulfa Antibiotics Hives

## 2019-07-15 RX ORDER — SILDENAFIL 100 MG/1
100 TABLET, FILM COATED ORAL DAILY PRN
Qty: 10 TABLET | Refills: 0 | Status: SHIPPED | OUTPATIENT
Start: 2019-07-15 | End: 2020-04-06 | Stop reason: ALTCHOICE

## 2019-07-30 DIAGNOSIS — E78.00 HYPERCHOLESTEREMIA: ICD-10-CM

## 2019-07-30 DIAGNOSIS — E11.9 TYPE 2 DIABETES MELLITUS WITHOUT COMPLICATION, WITH LONG-TERM CURRENT USE OF INSULIN (HCC): ICD-10-CM

## 2019-07-30 DIAGNOSIS — Z79.4 TYPE 2 DIABETES MELLITUS WITHOUT COMPLICATION, WITH LONG-TERM CURRENT USE OF INSULIN (HCC): ICD-10-CM

## 2019-07-30 DIAGNOSIS — E66.01 MORBID OBESITY DUE TO EXCESS CALORIES (HCC): ICD-10-CM

## 2019-07-30 LAB
ALBUMIN SERPL-MCNC: 4.2 G/DL (ref 3.5–5.2)
ALP BLD-CCNC: 56 U/L (ref 40–130)
ALT SERPL-CCNC: 42 U/L (ref 5–41)
ANION GAP SERPL CALCULATED.3IONS-SCNC: 12 MMOL/L (ref 7–19)
AST SERPL-CCNC: 29 U/L (ref 5–40)
BASOPHILS ABSOLUTE: 0.1 K/UL (ref 0–0.2)
BASOPHILS RELATIVE PERCENT: 1.1 % (ref 0–1)
BILIRUB SERPL-MCNC: 0.7 MG/DL (ref 0.2–1.2)
BUN BLDV-MCNC: 12 MG/DL (ref 6–20)
CALCIUM SERPL-MCNC: 9.4 MG/DL (ref 8.6–10)
CHLORIDE BLD-SCNC: 100 MMOL/L (ref 98–111)
CHOLESTEROL, TOTAL: 210 MG/DL (ref 160–199)
CO2: 26 MMOL/L (ref 22–29)
CREAT SERPL-MCNC: 0.9 MG/DL (ref 0.5–1.2)
EOSINOPHILS ABSOLUTE: 0.3 K/UL (ref 0–0.6)
EOSINOPHILS RELATIVE PERCENT: 2.8 % (ref 0–5)
GFR NON-AFRICAN AMERICAN: >60
GLUCOSE BLD-MCNC: 141 MG/DL (ref 74–109)
HBA1C MFR BLD: 7.6 % (ref 4–6)
HCT VFR BLD CALC: 48.6 % (ref 42–52)
HDLC SERPL-MCNC: 40 MG/DL (ref 55–121)
HEMOGLOBIN: 15.1 G/DL (ref 14–18)
LDL CHOLESTEROL CALCULATED: 147 MG/DL
LYMPHOCYTES ABSOLUTE: 3 K/UL (ref 1.1–4.5)
LYMPHOCYTES RELATIVE PERCENT: 32.6 % (ref 20–40)
MCH RBC QN AUTO: 26.9 PG (ref 27–31)
MCHC RBC AUTO-ENTMCNC: 31.1 G/DL (ref 33–37)
MCV RBC AUTO: 86.5 FL (ref 80–94)
MONOCYTES ABSOLUTE: 0.9 K/UL (ref 0–0.9)
MONOCYTES RELATIVE PERCENT: 9.4 % (ref 0–10)
NEUTROPHILS ABSOLUTE: 4.9 K/UL (ref 1.5–7.5)
NEUTROPHILS RELATIVE PERCENT: 53.3 % (ref 50–65)
PDW BLD-RTO: 13.5 % (ref 11.5–14.5)
PLATELET # BLD: 245 K/UL (ref 130–400)
PMV BLD AUTO: 10.8 FL (ref 9.4–12.4)
POTASSIUM SERPL-SCNC: 4.2 MMOL/L (ref 3.5–5)
PROSTATE SPECIFIC ANTIGEN: 0.71 NG/ML (ref 0–4)
RBC # BLD: 5.62 M/UL (ref 4.7–6.1)
SODIUM BLD-SCNC: 138 MMOL/L (ref 136–145)
TOTAL PROTEIN: 7.2 G/DL (ref 6.6–8.7)
TRIGL SERPL-MCNC: 116 MG/DL (ref 0–149)
WBC # BLD: 9.2 K/UL (ref 4.8–10.8)

## 2019-08-06 ENCOUNTER — OFFICE VISIT (OUTPATIENT)
Dept: INTERNAL MEDICINE | Age: 54
End: 2019-08-06
Payer: COMMERCIAL

## 2019-08-06 VITALS
SYSTOLIC BLOOD PRESSURE: 138 MMHG | DIASTOLIC BLOOD PRESSURE: 80 MMHG | WEIGHT: 315 LBS | HEART RATE: 64 BPM | BODY MASS INDEX: 42.81 KG/M2 | OXYGEN SATURATION: 96 %

## 2019-08-06 DIAGNOSIS — M19.079 OSTEOARTHRITIS OF ANKLE, UNSPECIFIED LATERALITY, UNSPECIFIED OSTEOARTHRITIS TYPE: ICD-10-CM

## 2019-08-06 DIAGNOSIS — Z79.4 TYPE 2 DIABETES MELLITUS WITHOUT COMPLICATION, WITH LONG-TERM CURRENT USE OF INSULIN (HCC): Primary | ICD-10-CM

## 2019-08-06 DIAGNOSIS — E11.9 TYPE 2 DIABETES MELLITUS WITHOUT COMPLICATION, WITH LONG-TERM CURRENT USE OF INSULIN (HCC): Primary | ICD-10-CM

## 2019-08-06 DIAGNOSIS — E78.00 HYPERCHOLESTEREMIA: ICD-10-CM

## 2019-08-06 PROCEDURE — 99214 OFFICE O/P EST MOD 30 MIN: CPT | Performed by: INTERNAL MEDICINE

## 2019-08-06 ASSESSMENT — ENCOUNTER SYMPTOMS
TROUBLE SWALLOWING: 0
ABDOMINAL PAIN: 0
EYE ITCHING: 0
EYE DISCHARGE: 0
ABDOMINAL DISTENTION: 0
SORE THROAT: 0
NAUSEA: 0
SINUS PRESSURE: 0
BACK PAIN: 0
SHORTNESS OF BREATH: 0
BLOOD IN STOOL: 0
VOMITING: 0
WHEEZING: 0

## 2019-08-06 NOTE — PROGRESS NOTES
[facility]  [address] [address]  [address] [address]  Dept: [phone]  Dept Fax: [phone]: [phone]      Visit Date: 8/6/2019    Ina Kumari a 48 y.o. male who presents today for:  Chief Complaint   Patient presents with    Diabetes         HPI:     Krista Monday is 48 and in for follow-up visit on his diabetes cholesterol and arthritis. He is been trying to exercise more and watching his diet. He was concerned because his weight did not drop off like he had hoped it would. He had lab work for review. No past medical history on file. Past Surgical History:   Procedure Laterality Date    CYST REMOVAL      TENDON MANIPULATION Right        No family history on file. Social History     Tobacco Use    Smoking status: Former Smoker    Smokeless tobacco: Never Used   Substance Use Topics    Alcohol use: No      Current Outpatient Medications   Medication Sig Dispense Refill    sildenafil (VIAGRA) 100 MG tablet Take 1 tablet by mouth daily as needed for Erectile Dysfunction 10 tablet 0    metFORMIN (GLUCOPHAGE) 500 MG tablet Take 1 tablet by mouth every morning (before breakfast) 30 tablet 5    meloxicam (MOBIC) 15 MG tablet Take 1 tablet by mouth daily 90 tablet 3     No current facility-administered medications for this visit. Allergies   Allergen Reactions    Penicillins Shortness Of Breath    Ketorolac Tromethamine Hives and Itching    Sulfa Antibiotics Hives         Subjective:     Review of Systems   Constitutional: Negative for activity change, appetite change, fatigue and fever. HENT: Negative for congestion, hearing loss, sinus pressure, sore throat and trouble swallowing. Eyes: Negative for discharge and itching. Respiratory: Negative for shortness of breath and wheezing. Cardiovascular: Negative for chest pain, palpitations and leg swelling.    Gastrointestinal: Negative for abdominal distention,

## 2019-08-19 ENCOUNTER — OFFICE VISIT (OUTPATIENT)
Dept: INTERNAL MEDICINE | Age: 54
End: 2019-08-19
Payer: COMMERCIAL

## 2019-08-19 VITALS
SYSTOLIC BLOOD PRESSURE: 122 MMHG | DIASTOLIC BLOOD PRESSURE: 70 MMHG | HEART RATE: 60 BPM | TEMPERATURE: 99.1 F | OXYGEN SATURATION: 96 %

## 2019-08-19 DIAGNOSIS — J01.40 ACUTE NON-RECURRENT PANSINUSITIS: ICD-10-CM

## 2019-08-19 PROCEDURE — 99213 OFFICE O/P EST LOW 20 MIN: CPT | Performed by: INTERNAL MEDICINE

## 2019-08-19 RX ORDER — LEVOFLOXACIN 500 MG/1
500 TABLET, FILM COATED ORAL DAILY
Qty: 10 TABLET | Refills: 0 | Status: SHIPPED | OUTPATIENT
Start: 2019-08-19 | End: 2019-09-03 | Stop reason: SDUPTHER

## 2019-08-19 ASSESSMENT — ENCOUNTER SYMPTOMS
VOMITING: 0
ABDOMINAL PAIN: 0
TROUBLE SWALLOWING: 0
SHORTNESS OF BREATH: 0
SINUS PRESSURE: 0
NAUSEA: 0
ABDOMINAL DISTENTION: 0
EYE DISCHARGE: 0
EYE ITCHING: 0
BLOOD IN STOOL: 0
WHEEZING: 0
SINUS PAIN: 1
BACK PAIN: 0
SORE THROAT: 1

## 2019-08-22 ENCOUNTER — TELEPHONE (OUTPATIENT)
Dept: INTERNAL MEDICINE | Age: 54
End: 2019-08-22

## 2019-08-27 ENCOUNTER — TELEPHONE (OUTPATIENT)
Dept: INTERNAL MEDICINE | Age: 54
End: 2019-08-27

## 2019-08-27 NOTE — TELEPHONE ENCOUNTER
Spoke with pt, he would like to be referred to someone besides Cassie Purvis; they cannot get him in until January

## 2019-09-03 ENCOUNTER — TELEPHONE (OUTPATIENT)
Dept: INTERNAL MEDICINE | Age: 54
End: 2019-09-03

## 2019-09-03 RX ORDER — LEVOFLOXACIN 500 MG/1
500 TABLET, FILM COATED ORAL DAILY
Qty: 10 TABLET | Refills: 0 | Status: SHIPPED | OUTPATIENT
Start: 2019-09-03 | End: 2019-09-13

## 2019-10-09 ENCOUNTER — TELEPHONE (OUTPATIENT)
Dept: INTERNAL MEDICINE | Age: 54
End: 2019-10-09

## 2019-10-09 ENCOUNTER — APPOINTMENT (OUTPATIENT)
Dept: CT IMAGING | Facility: HOSPITAL | Age: 54
End: 2019-10-09

## 2019-10-09 ENCOUNTER — HOSPITAL ENCOUNTER (EMERGENCY)
Facility: HOSPITAL | Age: 54
Discharge: HOME | End: 2019-10-09
Admitting: EMERGENCY MEDICINE

## 2019-10-09 VITALS
HEIGHT: 78 IN | SYSTOLIC BLOOD PRESSURE: 147 MMHG | DIASTOLIC BLOOD PRESSURE: 78 MMHG | OXYGEN SATURATION: 97 % | RESPIRATION RATE: 18 BRPM | BODY MASS INDEX: 36.45 KG/M2 | HEART RATE: 57 BPM | WEIGHT: 315 LBS | TEMPERATURE: 97.8 F

## 2019-10-09 DIAGNOSIS — S09.90XA INJURY OF HEAD, INITIAL ENCOUNTER: Primary | ICD-10-CM

## 2019-10-09 PROCEDURE — 70450 CT HEAD/BRAIN W/O DYE: CPT

## 2019-10-09 PROCEDURE — 96375 TX/PRO/DX INJ NEW DRUG ADDON: CPT

## 2019-10-09 PROCEDURE — 99282 EMERGENCY DEPT VISIT SF MDM: CPT

## 2019-10-09 RX ORDER — SILDENAFIL 100 MG/1
100 TABLET, FILM COATED ORAL DAILY PRN
COMMUNITY
End: 2022-01-12

## 2019-10-09 RX ORDER — MELOXICAM 15 MG/1
15 TABLET ORAL DAILY
COMMUNITY
End: 2021-11-12

## 2019-10-10 NOTE — ED PROVIDER NOTES
Subjective   Patient is a 54-year-old male presents ER today with complaint of head injury.  The patient states that on October 6, 2019 he was cutting a large PVC pipe down that was hanging from his house.  He states that the pipe hit him in the head.  He states that he did not have loss of consciousness but since that time has had a headache, sensitivity to light and sound, dizziness, and has felt more irritable than normal.  He states that due to the symptoms he wanted to come to the ER today to be evaluated.  He is not currently on any blood thinners.  He reports that he has had previous head trauma in the past and has had several concussions.  He denies any neck pain.  He presents here today for further evaluation.        History provided by:  Patient   used: No    Head Injury   Location:  Generalized  Time since incident:  4 days  Pain details:     Quality:  Aching and dull    Severity:  Moderate    Duration:  4 days    Timing:  Constant    Progression:  Worsening  Chronicity:  New  Relieved by:  Nothing  Worsened by:  Nothing  Ineffective treatments:  None tried  Associated symptoms: headache    Associated symptoms: no blurred vision, no difficulty breathing, no disorientation, no double vision, no focal weakness, no hearing loss, no loss of consciousness, no memory loss, no nausea, no neck pain, no numbness, no seizures, no tinnitus and no vomiting    Risk factors: obesity    Risk factors: no alcohol use, no aspirin use, not elderly and no occupational exposure        Review of Systems   HENT: Negative for hearing loss and tinnitus.    Eyes: Negative for blurred vision and double vision.   Gastrointestinal: Negative for nausea and vomiting.   Musculoskeletal: Negative for neck pain.   Neurological: Positive for headaches. Negative for focal weakness, seizures, loss of consciousness and numbness.   Psychiatric/Behavioral: Negative for memory loss.   All other systems reviewed and are  negative.      Past Medical History:   Diagnosis Date   • Diabetes mellitus (CMS/HCC)        Allergies   Allergen Reactions   • Penicillins Shortness Of Breath   • Contrast Dye Nausea Only   • Sulfa Antibiotics Hives   • Toradol [Ketorolac Tromethamine] Hives and Itching       Past Surgical History:   Procedure Laterality Date   • FOOT SURGERY Right        History reviewed. No pertinent family history.    Social History     Socioeconomic History   • Marital status:      Spouse name: Not on file   • Number of children: Not on file   • Years of education: Not on file   • Highest education level: Not on file   Tobacco Use   • Smoking status: Former Smoker     Last attempt to quit: 10/2/2012     Years since quittin.0   • Smokeless tobacco: Never Used   Substance and Sexual Activity   • Alcohol use: No   • Drug use: No           Objective   Physical Exam   Constitutional: He is oriented to person, place, and time. He appears well-developed and well-nourished.   HENT:   Head: Normocephalic and atraumatic.   Eyes: Conjunctivae are normal. Pupils are equal, round, and reactive to light.   Cardiovascular: Normal rate, regular rhythm and normal heart sounds.   Pulmonary/Chest: Effort normal and breath sounds normal.   Neurological: He is alert and oriented to person, place, and time.   Skin: Skin is warm and dry. Capillary refill takes less than 2 seconds.   Psychiatric: He has a normal mood and affect.   Nursing note and vitals reviewed.      Procedures           ED Course  ED Course as of Oct 10 0018   Thu Oct 10, 2019   0013 CT scan of the head shows no acute findings.  CT scan was ordered as the patient reported that this occurred with a dangerous mechanism of injury including large, weighted pipe that fell several feet and hit pt in head.   [LF]   0018 Patient was discharged home at this time stable condition advised to return to ER if any new or worsening symptoms.  Advised follow-up with primary care  provider 1 to 2 days for recheck.  [LF]      ED Course User Index  [LF] Tiff Espinoza, MIKE        CT Head Without Contrast   Final Result       No acute intracranial findings.   This report was finalized on 10/09/2019 18:34 by Dr. Joaquin Pena MD.                  MDM  Number of Diagnoses or Management Options  Injury of head, initial encounter: new and requires workup     Amount and/or Complexity of Data Reviewed  Tests in the radiology section of CPT®: ordered and reviewed    Patient Progress  Patient progress: stable      Final diagnoses:   Injury of head, initial encounter              Tiff Espinoza, MIKE  10/10/19 0018

## 2019-11-24 ENCOUNTER — HOSPITAL ENCOUNTER (OUTPATIENT)
Facility: HOSPITAL | Age: 54
Setting detail: OBSERVATION
Discharge: HOME | End: 2019-11-25
Attending: EMERGENCY MEDICINE | Admitting: FAMILY MEDICINE

## 2019-11-24 ENCOUNTER — APPOINTMENT (OUTPATIENT)
Dept: CT IMAGING | Facility: HOSPITAL | Age: 54
End: 2019-11-24

## 2019-11-24 DIAGNOSIS — R52 INTRACTABLE PAIN: ICD-10-CM

## 2019-11-24 DIAGNOSIS — S32.010A COMPRESSION FRACTURE OF L1 VERTEBRA, INITIAL ENCOUNTER (HCC): Primary | ICD-10-CM

## 2019-11-24 PROBLEM — M54.9 INTRACTABLE BACK PAIN: Status: ACTIVE | Noted: 2019-11-24

## 2019-11-24 PROBLEM — E66.01 MORBID OBESITY (HCC): Status: ACTIVE | Noted: 2019-11-24

## 2019-11-24 PROBLEM — E11.9 DIABETES MELLITUS (HCC): Status: ACTIVE | Noted: 2019-11-24

## 2019-11-24 LAB
ALBUMIN SERPL-MCNC: 4.1 G/DL (ref 3.5–5.2)
ALBUMIN/GLOB SERPL: 1.9 G/DL
ALP SERPL-CCNC: 56 U/L (ref 39–117)
ALT SERPL W P-5'-P-CCNC: 48 U/L (ref 1–41)
ANION GAP SERPL CALCULATED.3IONS-SCNC: 10 MMOL/L (ref 5–15)
APTT PPP: 27.7 SECONDS (ref 24.1–35)
AST SERPL-CCNC: 35 U/L (ref 1–40)
BASOPHILS # BLD AUTO: 0.08 10*3/MM3 (ref 0–0.2)
BASOPHILS NFR BLD AUTO: 0.6 % (ref 0–1.5)
BILIRUB SERPL-MCNC: 0.3 MG/DL (ref 0.2–1.2)
BUN BLD-MCNC: 10 MG/DL (ref 6–20)
BUN/CREAT SERPL: 12.7 (ref 7–25)
CALCIUM SPEC-SCNC: 9.7 MG/DL (ref 8.6–10.5)
CHLORIDE SERPL-SCNC: 100 MMOL/L (ref 98–107)
CO2 SERPL-SCNC: 27 MMOL/L (ref 22–29)
CREAT BLD-MCNC: 0.79 MG/DL (ref 0.76–1.27)
DEPRECATED RDW RBC AUTO: 40.1 FL (ref 37–54)
EOSINOPHIL # BLD AUTO: 0.22 10*3/MM3 (ref 0–0.4)
EOSINOPHIL NFR BLD AUTO: 1.8 % (ref 0.3–6.2)
ERYTHROCYTE [DISTWIDTH] IN BLOOD BY AUTOMATED COUNT: 13.8 % (ref 12.3–15.4)
GFR SERPL CREATININE-BSD FRML MDRD: 103 ML/MIN/1.73
GLOBULIN UR ELPH-MCNC: 2.2 GM/DL
GLUCOSE BLD-MCNC: 145 MG/DL (ref 65–99)
HCT VFR BLD AUTO: 44.4 % (ref 37.5–51)
HGB BLD-MCNC: 14.9 G/DL (ref 13–17.7)
HOLD SPECIMEN: NORMAL
HOLD SPECIMEN: NORMAL
IMM GRANULOCYTES # BLD AUTO: 0.09 10*3/MM3 (ref 0–0.05)
IMM GRANULOCYTES NFR BLD AUTO: 0.7 % (ref 0–0.5)
INR PPP: 1.01 (ref 0.91–1.09)
LYMPHOCYTES # BLD AUTO: 2.73 10*3/MM3 (ref 0.7–3.1)
LYMPHOCYTES NFR BLD AUTO: 22 % (ref 19.6–45.3)
MCH RBC QN AUTO: 27.3 PG (ref 26.6–33)
MCHC RBC AUTO-ENTMCNC: 33.6 G/DL (ref 31.5–35.7)
MCV RBC AUTO: 81.5 FL (ref 79–97)
MONOCYTES # BLD AUTO: 1.01 10*3/MM3 (ref 0.1–0.9)
MONOCYTES NFR BLD AUTO: 8.1 % (ref 5–12)
NEUTROPHILS # BLD AUTO: 8.27 10*3/MM3 (ref 1.7–7)
NEUTROPHILS NFR BLD AUTO: 66.8 % (ref 42.7–76)
NRBC BLD AUTO-RTO: 0 /100 WBC (ref 0–0.2)
PLATELET # BLD AUTO: 229 10*3/MM3 (ref 140–450)
PMV BLD AUTO: 10.6 FL (ref 6–12)
POTASSIUM BLD-SCNC: 4.4 MMOL/L (ref 3.5–5.2)
PROT SERPL-MCNC: 6.3 G/DL (ref 6–8.5)
PROTHROMBIN TIME: 13.6 SECONDS (ref 11.9–14.6)
RBC # BLD AUTO: 5.45 10*6/MM3 (ref 4.14–5.8)
SODIUM BLD-SCNC: 137 MMOL/L (ref 136–145)
WBC NRBC COR # BLD: 12.4 10*3/MM3 (ref 3.4–10.8)
WHOLE BLOOD HOLD SPECIMEN: NORMAL
WHOLE BLOOD HOLD SPECIMEN: NORMAL

## 2019-11-24 PROCEDURE — 80053 COMPREHEN METABOLIC PANEL: CPT | Performed by: EMERGENCY MEDICINE

## 2019-11-24 PROCEDURE — 25010000002 ONDANSETRON PER 1 MG: Performed by: EMERGENCY MEDICINE

## 2019-11-24 PROCEDURE — G0378 HOSPITAL OBSERVATION PER HR: HCPCS

## 2019-11-24 PROCEDURE — 99284 EMERGENCY DEPT VISIT MOD MDM: CPT

## 2019-11-24 PROCEDURE — 85025 COMPLETE CBC W/AUTO DIFF WBC: CPT | Performed by: EMERGENCY MEDICINE

## 2019-11-24 PROCEDURE — 85730 THROMBOPLASTIN TIME PARTIAL: CPT | Performed by: EMERGENCY MEDICINE

## 2019-11-24 PROCEDURE — 96376 TX/PRO/DX INJ SAME DRUG ADON: CPT

## 2019-11-24 PROCEDURE — 96374 THER/PROPH/DIAG INJ IV PUSH: CPT

## 2019-11-24 PROCEDURE — 25010000002 MORPHINE PER 10 MG: Performed by: EMERGENCY MEDICINE

## 2019-11-24 PROCEDURE — 85610 PROTHROMBIN TIME: CPT | Performed by: EMERGENCY MEDICINE

## 2019-11-24 PROCEDURE — 96375 TX/PRO/DX INJ NEW DRUG ADDON: CPT

## 2019-11-24 PROCEDURE — 72131 CT LUMBAR SPINE W/O DYE: CPT

## 2019-11-24 PROCEDURE — 99285 EMERGENCY DEPT VISIT HI MDM: CPT

## 2019-11-24 PROCEDURE — 99204 OFFICE O/P NEW MOD 45 MIN: CPT | Performed by: NEUROLOGICAL SURGERY

## 2019-11-24 RX ORDER — ONDANSETRON 2 MG/ML
4 INJECTION INTRAMUSCULAR; INTRAVENOUS EVERY 6 HOURS PRN
Status: DISCONTINUED | OUTPATIENT
Start: 2019-11-24 | End: 2019-11-25 | Stop reason: HOSPADM

## 2019-11-24 RX ORDER — ONDANSETRON 2 MG/ML
4 INJECTION INTRAMUSCULAR; INTRAVENOUS ONCE
Status: COMPLETED | OUTPATIENT
Start: 2019-11-24 | End: 2019-11-24

## 2019-11-24 RX ORDER — SODIUM CHLORIDE 0.9 % (FLUSH) 0.9 %
10 SYRINGE (ML) INJECTION AS NEEDED
Status: DISCONTINUED | OUTPATIENT
Start: 2019-11-24 | End: 2019-11-25 | Stop reason: HOSPADM

## 2019-11-24 RX ORDER — OXYCODONE AND ACETAMINOPHEN 7.5; 325 MG/1; MG/1
2 TABLET ORAL EVERY 4 HOURS PRN
Status: DISCONTINUED | OUTPATIENT
Start: 2019-11-24 | End: 2019-11-25 | Stop reason: HOSPADM

## 2019-11-24 RX ORDER — LIDOCAINE 50 MG/G
1 PATCH TOPICAL EVERY 24 HOURS
Status: DISCONTINUED | OUTPATIENT
Start: 2019-11-24 | End: 2019-11-25 | Stop reason: HOSPADM

## 2019-11-24 RX ORDER — SENNA AND DOCUSATE SODIUM 50; 8.6 MG/1; MG/1
2 TABLET, FILM COATED ORAL 2 TIMES DAILY
Status: DISCONTINUED | OUTPATIENT
Start: 2019-11-24 | End: 2019-11-25 | Stop reason: HOSPADM

## 2019-11-24 RX ORDER — SODIUM CHLORIDE 0.9 % (FLUSH) 0.9 %
10 SYRINGE (ML) INJECTION EVERY 12 HOURS SCHEDULED
Status: DISCONTINUED | OUTPATIENT
Start: 2019-11-24 | End: 2019-11-25 | Stop reason: HOSPADM

## 2019-11-24 RX ORDER — ALUMINA, MAGNESIA, AND SIMETHICONE 2400; 2400; 240 MG/30ML; MG/30ML; MG/30ML
15 SUSPENSION ORAL EVERY 6 HOURS PRN
Status: DISCONTINUED | OUTPATIENT
Start: 2019-11-24 | End: 2019-11-25 | Stop reason: HOSPADM

## 2019-11-24 RX ORDER — OXYCODONE AND ACETAMINOPHEN 7.5; 325 MG/1; MG/1
1 TABLET ORAL EVERY 4 HOURS PRN
Status: DISCONTINUED | OUTPATIENT
Start: 2019-11-24 | End: 2019-11-25 | Stop reason: HOSPADM

## 2019-11-24 RX ADMIN — OXYCODONE HYDROCHLORIDE AND ACETAMINOPHEN 1 TABLET: 7.5; 325 TABLET ORAL at 20:39

## 2019-11-24 RX ADMIN — LIDOCAINE 1 PATCH: 50 PATCH CUTANEOUS at 18:37

## 2019-11-24 RX ADMIN — HYDROMORPHONE HYDROCHLORIDE 1 MG: 1 INJECTION, SOLUTION INTRAMUSCULAR; INTRAVENOUS; SUBCUTANEOUS at 16:25

## 2019-11-24 RX ADMIN — SODIUM CHLORIDE, PRESERVATIVE FREE 10 ML: 5 INJECTION INTRAVENOUS at 20:39

## 2019-11-24 RX ADMIN — HYDROMORPHONE HYDROCHLORIDE 1 MG: 1 INJECTION, SOLUTION INTRAMUSCULAR; INTRAVENOUS; SUBCUTANEOUS at 15:51

## 2019-11-24 RX ADMIN — ONDANSETRON HYDROCHLORIDE 4 MG: 2 SOLUTION INTRAMUSCULAR; INTRAVENOUS at 13:40

## 2019-11-24 RX ADMIN — HYDROMORPHONE HYDROCHLORIDE 1 MG: 1 INJECTION, SOLUTION INTRAMUSCULAR; INTRAVENOUS; SUBCUTANEOUS at 14:19

## 2019-11-24 RX ADMIN — MORPHINE SULFATE 4 MG: 4 INJECTION, SOLUTION INTRAMUSCULAR; INTRAVENOUS at 13:40

## 2019-11-24 NOTE — ED PROVIDER NOTES
Subjective   This 54-year-old male patient went outside this morning and slipped on icy wooden ramp landed in a sitting position.  About 5:45 in the morning he states he had immediate pain to his lower back area and felt very nauseated.  They went to [facility] and were seen and had x-rays done.  The emergency physician discharge them with Percocet and told him that he did not see any fracture.  About an hour after he got home they received a phone call from the [facility] stating that he needs to come back for CT scan.  They discovered that he had a possible fracture of L1.  They were not very happy with lives in [facility] stated they called ambulance and they were brought here.  Patient continues to have severe pain in his lower back which is much worse if he tries to sit up at all or walk.  He has no pain radiating down the legs.  No bowel or bladder dysfunction.  When he lays flat the pain is not too bad.            Review of Systems   Constitutional: Positive for activity change.   HENT: Negative.    Eyes: Negative.    Respiratory: Negative.    Cardiovascular: Negative.    Gastrointestinal: Negative.    Genitourinary: Negative.    Musculoskeletal: Positive for back pain. Negative for neck pain.        He has acute severe lower back pain.  He has good motion of his hips knees and ankles and no radiation down the lower extremities.   Neurological:        Patient states when he fell he landed in the sitting position he did not hit his head and he has no headache or neck pain.   Psychiatric/Behavioral: Negative.        Past Medical History:   Diagnosis Date   • Diabetes mellitus (CMS/HCC)        Allergies   Allergen Reactions   • Penicillins Shortness Of Breath   • Contrast Dye Nausea Only   • Sulfa Antibiotics Hives   • Toradol [Ketorolac Tromethamine] Hives and Itching       Past Surgical History:   Procedure Laterality Date   • FOOT SURGERY Right        History reviewed. No  pertinent family history.    Social History     Socioeconomic History   • Marital status:      Spouse name: Not on file   • Number of children: Not on file   • Years of education: Not on file   • Highest education level: Not on file   Tobacco Use   • Smoking status: Former Smoker     Last attempt to quit: 10/2/2012     Years since quittin.1   • Smokeless tobacco: Never Used   Substance and Sexual Activity   • Alcohol use: No     Comment: rarely   • Drug use: No           Objective   Physical Exam   Constitutional: He is oriented to person, place, and time. He appears well-developed and well-nourished.   HENT:   Head: Normocephalic and atraumatic.   Eyes: EOM are normal. Pupils are equal, round, and reactive to light.   Neck: Normal range of motion. Neck supple.   Cardiovascular: Normal rate, regular rhythm and normal heart sounds.   Pulmonary/Chest: Effort normal and breath sounds normal.   Abdominal: Soft. Bowel sounds are normal.   Musculoskeletal:   He is tender in the upper lumbar region.   Neurological: He is alert and oriented to person, place, and time.   Skin: Skin is warm and dry.   Nursing note and vitals reviewed.      Procedures           ED Course                  MDM  Number of Diagnoses or Management Options  Diagnosis management comments: 1500 CT scan does indeed show a comminuted fracture of the body of L1 with no evidence of posterior retropulsion toward the spinal cord.  Dr. Weaver was called and recommended a brace and pain medication.  1530 Dr. Weaver was present in the department and the patient and they seem to be willing to try to go home if they can get the brace on.  1600 The patient has continued intractable pain despite having multiple doses of narcotic medication he still cannot get up to put on the brace which was recommended by Dr. Weaver the neurosurgeon.  The case was then discussed with Dr. Frazier who agrees to admit the patient for observation.        Final  diagnoses:   Compression fracture of L1 vertebra, initial encounter (CMS/[facility])   Intractable pain              Benoit Ibrahim DO  11/24/19 4642

## 2019-11-24 NOTE — PROGRESS NOTES
NEUROSURGERY INITIAL HOSPITAL ENCOUNTER    Assessment/Plan:   Gordy Erwin is a 54 y.o. male with a significant comorbidity of obesity and diabetes.  He presents with a new problem of acute onset of back pain after fall without radiation. Physical exam findings of neurologically intact with tenderness to palpation over thoracal lumbar junction.  Their imaging shows 10% compression fracture of L1 vertebral body.    Differential Diagnosis:   Acute L1 compression fracture    Recommendations:  I discussed the radiographic imaging and prognosis with Gordy and his wife.  He has an acute traumatic L1 compression fracture of approximately 10% height loss.  There is no involvement of the posterior elements.  Given that he is not elderly and does not have osteoporosis, nor any risk factors for osteoporosis, he is not a candidate for kyphoplasty.  He is also a non-smoker.  Therefore his fracture should heal on his brace.  This should take approximately 3 months.  Would recommend aggressive pain management.  The patient is allergic to Toradol.  Therefore would recommend Tylenol, narcotics, lidocaine patches, gabapentin, and Valium as needed.  Typically these fractures will require 2 to 3 days of bedrest followed by slow progressive ambulation.  2 weeks off work as appropriate.  His fracture is intrinsically stable.  Would recommend TLSO brace for comfort but is not needed for stability.  Please notify neurosurgery should the patient develop new weakness, bowel or bladder dysfunction, or difficulty with gait.    I discussed the patients findings and my recommendations with patient, family and consulting provider    Thank you very much for this interesting consult.     Level of Risk: High due to:  illness with threat to life or bodily function  MDM: Moderate Complexity  Mod = 18059, High=99223  ___________________________________________________________________    Reason for consult  Fall from standing height    Chief  Complaint:   Chief Complaint   Patient presents with   • Back Pain       HPI: Gordy Erwin is a 54 y.o. male with a significant comorbidity diabetes, morbid obesity.  Of note his wife recently had a two-level fusion by Dr. Kaplan.  The patient was in her normal state of health until earlier today when he slipped going down a wheelchair ramp at his house.  His feet came out from underneath him and he landed on his buttocks.  He had acute onset of back pain.  He went to an outside hospital where x-rays were performed and read as negative.  They were not pleased with the care there and were called back for CT scan and therefore sought care at [facility] emergency room.    On admission here he was complaining of 10 out of 10 pain.  This was localized to his back.  This worsened with twisting or sitting up.  There is no radiation into his legs.  He has no numbness or weakness.  He did have difficulty getting in a car with the assistance of the spells.  There is no bowel or bladder incontinence.    Noncontrast CT scan revealed a 10% compression fracture of his lumbar spine.    Review of Systems   Constitutional: Negative.    HENT: Negative.    Eyes: Negative.    Respiratory: Negative.    Cardiovascular: Negative.    Gastrointestinal: Negative.    Endocrine: Negative.    Genitourinary: Negative.    Musculoskeletal: Positive for back pain and gait problem.   Skin: Negative.    Allergic/Immunologic: Negative.    Hematological: Negative.    Psychiatric/Behavioral: Negative.         Past Medical History:  has a past medical history of Diabetes mellitus (CMS/formerly [facility]).    Past Surgical History:  has a past surgical history that includes Foot surgery (Right).    Family History: family history is not on file.    Social History:  reports that he quit smoking about 7 years ago. He has never used smokeless tobacco. He reports that he does not drink alcohol or use drugs.    Allergies: Penicillins; Contrast dye; Sulfa antibiotics;  and Toradol [ketorolac tromethamine]    Home Medications:   Current Facility-Administered Medications:   •  [COMPLETED] Insert peripheral IV, , , Once **AND** sodium chloride 0.9 % flush 10 mL, 10 mL, Intravenous, PRN, Benoit Ibrahim,     Current Outpatient Medications:   •  diclofenac (VOLTAREN) 1 % gel gel, Apply 4 g topically to the appropriate area as directed 4 (Four) Times a Day As Needed., Disp: , Rfl:   •  meloxicam (MOBIC) 15 MG tablet, Take 15 mg by mouth., Disp: , Rfl:   •  diclofenac (VOLTAREN) 75 MG EC tablet, Take 1 tablet by mouth 2 (Two) Times a Day., Disp: 14 tablet, Rfl: 0  •  metFORMIN (GLUCOPHAGE) 500 MG tablet, Take 500 mg by mouth Daily With Breakfast., Disp: , Rfl:   •  sildenafil (VIAGRA) 100 MG tablet, Take 100 mg by mouth., Disp: , Rfl:     Medications: Scheduled Meds:   Continuous Infusions:   PRN Meds:.•  [COMPLETED] Insert peripheral IV **AND** sodium chloride    Vital Signs  Temp:  [98.3 °F (36.8 °C)-98.7 °F (37.1 °C)] 98.7 °F (37.1 °C)  Heart Rate:  [54-62] 61  Resp:  [13-15] 13  BP: (137-154)/(64-83) 143/69    Physical Exam  Physical Exam   Constitutional: He is oriented to person, place, and time. He appears well-developed and well-nourished.   HENT:   Head: Normocephalic and atraumatic.   Eyes: Conjunctivae and EOM are normal. Pupils are equal, round, and reactive to light.   Fundoscopic exam:       The right eye shows no exudate, no hemorrhage and no papilledema.        The left eye shows no exudate, no hemorrhage and no papilledema.   Neck: Normal range of motion. Neck supple.   Cardiovascular:   Pulses:       Dorsalis pedis pulses are 2+ on the right side, and 2+ on the left side.        Posterior tibial pulses are 2+ on the right side, and 2+ on the left side.   Pulmonary/Chest: Effort normal. No respiratory distress.     Vascular Status -  His right foot exhibits no edema. His left foot exhibits no edema.  Neurological: He is oriented to person, place, and time. He has a  normal Finger-Nose-Finger Test, a normal Heel to Benjamin Test and a normal Tandem Gait Test. Gait normal.   Skin: Skin is warm. No rash noted. No erythema.   Psychiatric: His speech is normal.       Neurologic Exam     Mental Status   Oriented to person, place, and time.   Registration: recalls 3 of 3 objects. Recall at 5 minutes: recalls 3 of 3 objects.   Attention: normal. Concentration: normal.   Speech: speech is normal   Knowledge: consistent with education.     Cranial Nerves     CN II   Visual acuity: normal    CN III, IV, VI   Pupils are equal, round, and reactive to light.  Extraocular motions are normal.   Diplopia: none    CN V   Facial sensation intact.   Right corneal reflex: normal  Left corneal reflex: normal    CN VII   Right facial weakness: none  Left facial weakness: none    CN VIII   Hearing: intact    CN IX, X   Palate: symmetric  Right gag reflex: normal  Left gag reflex: normal    CN XI   Right trapezius strength: normal  Left trapezius strength: normal    CN XII   Tongue deviation: none    Motor Exam   Right arm tone: normal  Left arm tone: normal  Right arm pronator drift: absent  Left arm pronator drift: absent  Right leg tone: normal  Left leg tone: normal    Strength   Strength 5/5 except as noted.     Sensory Exam   Light touch normal.   Proprioception normal.     Gait, Coordination, and Reflexes     Gait  Gait: normal    Coordination   Finger to nose coordination: normal  Heel to shin coordination: normal  Tandem walking coordination: normal    Reflexes   Reflexes 2+ except as noted.   Right plantar: normal  Left plantar: normal  Right Reyes: absent  Left Reyes: absent    Tenderness to palpation over thoracolumbar junction.    Results Review:   Independent review and interpretation of imaging  Imaging Results (Last 24 Hours)     Procedure Component Value Units Date/Time    CT Lumbar Spine Without Contrast [[id number]] Collected:  11/24/19 1437     Updated:  11/24/19 1443    Narrative:        CT LUMBAR SPINE WO CONTRAST- 11/24/2019 2:27 PM CST     HISTORY: acute fall. Possible fracture of L1 on other x-rays at another  hospital.     COMPARISON: None      DOSE LENGTH PRODUCT: 1259 mGy cm. Automated exposure control was also  utilized to decrease patient radiation dose.     TECHNIQUE: Helical tomographic images of the lumbar spine were obtained  without the use of contrast. Multiplanar reformatted images were  provided for review.      FINDINGS:   Alignment: Normal.     Bones: There is an acute fracture through the superior endplate of L1.  Minimal height loss is noted. The fracture extends into the LEFT  posterior aspect of the vertebral body but does not extend into the  pedicle. No other fractures are identified.     Disc spaces: Mild disc space narrowing is present at L1-L2 and L2-L3.  There are small disc bulges at L4-L5 and L5-S1.     Canal and neuroforamina: Moderate facet hypertrophy is present at L4-L5,  resulting in moderate bony narrowing of the neuroforamina. There is no  bony narrowing of the spinal canal.     Soft tissues: Unremarkable.       Impression:       1. Acute, comminuted compression fracture through the superior endplate  of L1 that extends into the posterior aspect of the vertebral body but  does not extend into the pedicles. Approximately 10% vertebral body  height loss.  2. Mild to moderate degenerative changes.        This report was finalized on 11/24/2019 14:40 by Dr. Les Young MD.        MRI brain:  MRI spine:   CT Head:  CT c-spine:  CT t-spine:  CT l-spine:  X-ray:    I reviewed the patient's new clinical results.  Lab Results (last 24 hours)     Procedure Component Value Units Date/Time    [place] Draw [[id number]] Collected:  11/24/19 1334    Specimen:  Blood Updated:  11/24/19 1446    Narrative:       The following orders were created for panel order [place] Draw.  Procedure                               Abnormality         Status                     ---------                                -----------         ------                     Light Blue Top[[id number]]                                   Final result               Green Top (Gel)[[id number]]                                  Final result               Lavender Top[[id number]]                                     Final result               Red Top[[id number]]                                          Final result                 Please view results for these tests on the individual orders.    Light Blue Top [[id number]] Collected:  11/24/19 1334    Specimen:  Blood Updated:  11/24/19 1446     Extra Tube hold for add-on     Comment: Auto resulted       Green Top (Gel) [[id number]] Collected:  11/24/19 1334    Specimen:  Blood Updated:  11/24/19 1446     Extra Tube Hold for add-ons.     Comment: Auto resulted.       Lavender Top [[id number]] Collected:  11/24/19 1334    Specimen:  Blood Updated:  11/24/19 1446     Extra Tube hold for add-on     Comment: Auto resulted       Red Top [[id number]] Collected:  11/24/19 1334    Specimen:  Blood Updated:  11/24/19 1446     Extra Tube Hold for add-ons.     Comment: Auto resulted.       Comprehensive Metabolic Panel [[id number]]  (Abnormal) Collected:  11/24/19 1334    Specimen:  Blood Updated:  11/24/19 1409     Glucose 145 mg/dL      BUN 10 mg/dL      Creatinine 0.79 mg/dL      Sodium 137 mmol/L      Potassium 4.4 mmol/L      Chloride 100 mmol/L      CO2 27.0 mmol/L      Calcium 9.7 mg/dL      Total Protein 6.3 g/dL      Albumin 4.10 g/dL      ALT (SGPT) 48 U/L      AST (SGOT) 35 U/L      Comment: Specimen hemolyzed.  Results may be affected.        Alkaline Phosphatase 56 U/L      Total Bilirubin 0.3 mg/dL      eGFR Non African Amer 103 mL/min/1.73      Globulin 2.2 gm/dL      A/G Ratio 1.9 g/dL      BUN/Creatinine Ratio 12.7     Anion Gap 10.0 mmol/L     Narrative:       GFR Normal >60  Chronic Kidney Disease <60  Kidney Failure <15    Protime-INR [[id number]]  (Normal) Collected:   11/24/19 1334    Specimen:  Blood Updated:  11/24/19 1350     Protime 13.6 Seconds      INR 1.01    aPTT [[id number]]  (Normal) Collected:  11/24/19 1334    Specimen:  Blood Updated:  11/24/19 1350     PTT 27.7 seconds     CBC & Differential [[id number]] Collected:  11/24/19 1334    Specimen:  Blood Updated:  11/24/19 1342    Narrative:       The following orders were created for panel order CBC & Differential.  Procedure                               Abnormality         Status                     ---------                               -----------         ------                     CBC Auto Differential[[id number]]        Abnormal            Final result                 Please view results for these tests on the individual orders.    CBC Auto Differential [[id number]]  (Abnormal) Collected:  11/24/19 1334    Specimen:  Blood Updated:  11/24/19 1342     WBC 12.40 10*3/mm3      RBC 5.45 10*6/mm3      Hemoglobin 14.9 g/dL      Hematocrit 44.4 %      MCV 81.5 fL      MCH 27.3 pg      MCHC 33.6 g/dL      RDW 13.8 %      RDW-SD 40.1 fl      MPV 10.6 fL      Platelets 229 10*3/mm3      Neutrophil % 66.8 %      Lymphocyte % 22.0 %      Monocyte % 8.1 %      Eosinophil % 1.8 %      Basophil % 0.6 %      Immature Grans % 0.7 %      Neutrophils, Absolute 8.27 10*3/mm3      Lymphocytes, Absolute 2.73 10*3/mm3      Monocytes, Absolute 1.01 10*3/mm3      Eosinophils, Absolute 0.22 10*3/mm3      Basophils, Absolute 0.08 10*3/mm3      Immature Grans, Absolute 0.09 10*3/mm3      nRBC 0.0 /100 WBC           Stephen Weaver MD

## 2019-11-24 NOTE — PROGRESS NOTES
Contacted from the emergency room.  Patient is a morbidly obese 54-year-old male who sustained a fall at home.  He had acute onset of back pain without radiation, numbness, weakness.  He has pain that is worsening with standing and relieved with laying down.    His imaging is reviewed remotely which shows an L1 10% compression fracture without evidence of instability.    Recommend maximal analgesics  TLSO  Rest and patient may follow-up with me in clinic next week  Given that this is a traumatic fracture in a non-osteoporotic patient kyphoplasty is not indicated.    Stephen Weaver MD

## 2019-11-24 NOTE — H&P
[facility] Medicine Services  HISTORY AND PHYSICAL    Date of Admission: 11/24/2019  Primary Care Physician: Hemal Gutierrez MD    Subjective     Chief Complaint:   Low back discomfort    History of Present Illness    This 54-year-old white male was admitted with a chief complaint of severe low back discomfort after a fall he incurred at home this morning at about 5:30.  The patient awakened this morning early in order to let his dog out and slipped on an icy wooden ramp at his home at about 5:30 this morning landing in a sitting position on his buttocks.  He encountered immediate and severe, debilitating back discomfort and subsequently went to the [facility] emergency department where x-rays were taken and he was treated and discharged with oral pain medication.  The [facility] emergency department called him back on the same day recommending he return for a CT scan for a possible fracture that was noted by the radiologist on plain film x-rays.  The patient came to our facility instead, for further evaluation.  The patient feels much better when he is lying flat on his back but has very significant and very limiting pain when sitting up or standing.  His pain has not been controlled without IV pain medication to this point.  Neurosurgery was consulted in the emergency department and evaluated the patient's CT films and saw the patient in the emergency department.  Recommendations were for maximal analgesics, TLSO, rest and follow-up with Dr. Weaver in his clinic next week.  Kyphoplasty was not indicated given the traumatic nature of the fracture in a non-osteoporotic patient.    CMP was obtained in the ER and was unremarkable.  White blood cell count is reactively elevated at 12,400.  INR and PTT within normal limits.    Review of Systems   Constitutional: Positive for activity change.   HENT: Negative.    Eyes: Negative.    Respiratory: Negative.    Cardiovascular:  Negative.    Gastrointestinal: Negative.    Endocrine: Negative.    Genitourinary: Negative.    Musculoskeletal: Positive for back pain and gait problem.   Skin: Negative.    Allergic/Immunologic: Negative.    Hematological: Negative.    Psychiatric/Behavioral: Negative.         Otherwise complete ROS reviewed and negative except as mentioned in the HPI.      Past Medical History:     Past Medical History:   Diagnosis Date   • Diabetes mellitus (CMS/HCC)        Past Surgical History:  Past Surgical History:   Procedure Laterality Date   • FOOT SURGERY Right    • GANGLION CYST EXCISION         Family History:   family history includes Bipolar disorder in his father; Heart disease in his mother; No Known Problems in his brother; Osteoporosis in his mother; Suicidality in his father.    Social History:    reports that he quit smoking about 7 years ago. He has never used smokeless tobacco. He reports that he does not drink alcohol or use drugs.    Medications:  Prior to Admission medications    Medication Sig Start Date End Date Taking? Authorizing Provider   diclofenac (VOLTAREN) 1 % gel gel Apply 4 g topically to the appropriate area as directed 4 (Four) Times a Day As Needed.   Yes Jerica Celis MD   meloxicam (MOBIC) 15 MG tablet Take 15 mg by mouth. 4/5/19  Yes Jerica Celis MD   diclofenac (VOLTAREN) 75 MG EC tablet Take 1 tablet by mouth 2 (Two) Times a Day. 10/2/17   Luis Antonio Mccall PA-C   metFORMIN (GLUCOPHAGE) 500 MG tablet Take 500 mg by mouth Daily With Breakfast. 6/25/19   Jerica Celis MD   sildenafil (VIAGRA) 100 MG tablet Take 100 mg by mouth. 7/15/19   Jerica Celis MD       Allergies:  Allergies   Allergen Reactions   • Penicillins Shortness Of Breath   • Contrast Dye Nausea Only   • Sulfa Antibiotics Hives   • Toradol [Ketorolac Tromethamine] Hives and Itching       Objective     Vital Signs:   /69   Pulse 61   Temp 98.7 °F (37.1 °C) (Oral)   Resp 13   Ht  "200.7 cm (79\")   Wt (!) 177 kg (391 lb)   SpO2 95%   BMI 44.05 kg/m²     Physical Exam   Constitutional: He is oriented to person, place, and time. He appears well-developed and well-nourished. He is cooperative. No distress.   HENT:   Head: Normocephalic and atraumatic.   Right Ear: External ear normal.   Left Ear: External ear normal.   Mouth/Throat: Oropharynx is clear and moist.   Eyes: Conjunctivae and EOM are normal. Pupils are equal, round, and reactive to light. No scleral icterus.   Neck: Normal range of motion. Neck supple. No JVD present.   Cardiovascular: Normal rate, regular rhythm, normal heart sounds and intact distal pulses.   No murmur heard.  Pulmonary/Chest: Effort normal and breath sounds normal. No respiratory distress.   Abdominal: Soft. Bowel sounds are normal. He exhibits no mass. There is no tenderness.   Musculoskeletal: He exhibits tenderness ( Upper lumbar region). He exhibits no edema or deformity.        Lumbar back: He exhibits decreased range of motion, tenderness, bony tenderness, pain and spasm.   Neurological: He is alert and oriented to person, place, and time. He displays normal reflexes. No cranial nerve deficit. Coordination normal.   Skin: Skin is warm and dry. No erythema.   Psychiatric: He has a normal mood and affect. His behavior is normal.         Results Reviewed:  Lab Results (last 24 hours)     Procedure Component Value Units Date/Time    [place] Draw [[id number]] Collected:  11/24/19 1334    Specimen:  Blood Updated:  11/24/19 1446    Narrative:       The following orders were created for panel order [place] Draw.  Procedure                               Abnormality         Status                     ---------                               -----------         ------                     Light Blue Top[[id number]]                                   Final result               Green Top (Gel)[[id number]]                                  Final result               Lavender " Top[[id number]]                                     Final result               Red Top[[id number]]                                          Final result                 Please view results for these tests on the individual orders.    Light Blue Top [[id number]] Collected:  11/24/19 1334    Specimen:  Blood Updated:  11/24/19 1446     Extra Tube hold for add-on     Comment: Auto resulted       Green Top (Gel) [[id number]] Collected:  11/24/19 1334    Specimen:  Blood Updated:  11/24/19 1446     Extra Tube Hold for add-ons.     Comment: Auto resulted.       Lavender Top [[id number]] Collected:  11/24/19 1334    Specimen:  Blood Updated:  11/24/19 1446     Extra Tube hold for add-on     Comment: Auto resulted       Red Top [[id number]] Collected:  11/24/19 1334    Specimen:  Blood Updated:  11/24/19 1446     Extra Tube Hold for add-ons.     Comment: Auto resulted.       Comprehensive Metabolic Panel [[id number]]  (Abnormal) Collected:  11/24/19 1334    Specimen:  Blood Updated:  11/24/19 1409     Glucose 145 mg/dL      BUN 10 mg/dL      Creatinine 0.79 mg/dL      Sodium 137 mmol/L      Potassium 4.4 mmol/L      Chloride 100 mmol/L      CO2 27.0 mmol/L      Calcium 9.7 mg/dL      Total Protein 6.3 g/dL      Albumin 4.10 g/dL      ALT (SGPT) 48 U/L      AST (SGOT) 35 U/L      Comment: Specimen hemolyzed.  Results may be affected.        Alkaline Phosphatase 56 U/L      Total Bilirubin 0.3 mg/dL      eGFR Non African Amer 103 mL/min/1.73      Globulin 2.2 gm/dL      A/G Ratio 1.9 g/dL      BUN/Creatinine Ratio 12.7     Anion Gap 10.0 mmol/L     Narrative:       GFR Normal >60  Chronic Kidney Disease <60  Kidney Failure <15    Protime-INR [[id number]]  (Normal) Collected:  11/24/19 1334    Specimen:  Blood Updated:  11/24/19 1350     Protime 13.6 Seconds      INR 1.01    aPTT [[id number]]  (Normal) Collected:  11/24/19 1334    Specimen:  Blood Updated:  11/24/19 1350     PTT 27.7 seconds     CBC & Differential [[id number]]  Collected:  11/24/19 1334    Specimen:  Blood Updated:  11/24/19 1342    Narrative:       The following orders were created for panel order CBC & Differential.  Procedure                               Abnormality         Status                     ---------                               -----------         ------                     CBC Auto Differential[[id number]]        Abnormal            Final result                 Please view results for these tests on the individual orders.    CBC Auto Differential [[id number]]  (Abnormal) Collected:  11/24/19 1334    Specimen:  Blood Updated:  11/24/19 1342     WBC 12.40 10*3/mm3      RBC 5.45 10*6/mm3      Hemoglobin 14.9 g/dL      Hematocrit 44.4 %      MCV 81.5 fL      MCH 27.3 pg      MCHC 33.6 g/dL      RDW 13.8 %      RDW-SD 40.1 fl      MPV 10.6 fL      Platelets 229 10*3/mm3      Neutrophil % 66.8 %      Lymphocyte % 22.0 %      Monocyte % 8.1 %      Eosinophil % 1.8 %      Basophil % 0.6 %      Immature Grans % 0.7 %      Neutrophils, Absolute 8.27 10*3/mm3      Lymphocytes, Absolute 2.73 10*3/mm3      Monocytes, Absolute 1.01 10*3/mm3      Eosinophils, Absolute 0.22 10*3/mm3      Basophils, Absolute 0.08 10*3/mm3      Immature Grans, Absolute 0.09 10*3/mm3      nRBC 0.0 /100 WBC           Ct Lumbar Spine Without Contrast    Result Date: 11/24/2019  Narrative: CT LUMBAR SPINE WO CONTRAST- 11/24/2019 2:27 PM CST  HISTORY: acute fall. Possible fracture of L1 on other x-rays at another hospital.  COMPARISON: None  DOSE LENGTH PRODUCT: 1259 mGy cm. Automated exposure control was also utilized to decrease patient radiation dose.  TECHNIQUE: Helical tomographic images of the lumbar spine were obtained without the use of contrast. Multiplanar reformatted images were provided for review.  FINDINGS: Alignment: Normal.  Bones: There is an acute fracture through the superior endplate of L1. Minimal height loss is noted. The fracture extends into the LEFT posterior aspect of the  vertebral body but does not extend into the pedicle. No other fractures are identified.  Disc spaces: Mild disc space narrowing is present at L1-L2 and L2-L3. There are small disc bulges at L4-L5 and L5-S1.  Canal and neuroforamina: Moderate facet hypertrophy is present at L4-L5, resulting in moderate bony narrowing of the neuroforamina. There is no bony narrowing of the spinal canal.  Soft tissues: Unremarkable.      Impression: 1. Acute, comminuted compression fracture through the superior endplate of L1 that extends into the posterior aspect of the vertebral body but does not extend into the pedicles. Approximately 10% vertebral body height loss. 2. Mild to moderate degenerative changes.   This report was finalized on 11/24/2019 14:40 by Dr. Les Young MD.     I have personally reviewed and interpreted the radiology studies and ECG obtained at time of admission.     Assessment / Plan      Assessment & Plan  Active Hospital Problems    Diagnosis   • **Intractable back pain   • Compression fracture of L1 lumbar vertebra (CMS/HCC)   • Diabetes mellitus (CMS/HCC)   • Morbid obesity (CMS/HCC)       PLAN:   Admit to observation  Lidoderm topical patch to lumbar region  Oral pain medications  Ambulate with assistance  Probable discharge tomorrow when pain control is achieved  Neurosurgery to follow-up while inpatient    Code Status:   Full code  Surrogate decision makers the patient's wife     I discussed the patient's findings and my recommendations with: The patient and his wife    Estimated length of stay: 1 day    Robert Frazier DO   11/24/19   4:55 PM

## 2019-11-24 NOTE — ED NOTES
Hugo LEAVITT and Ana PCT at bedside to attempt to apply TSLO brace.  Pt not tolerate movement and c/o pain 10/10.  Dr Ibrahim notified.  Ok to repeat dilaudid per Mike Alcala RN  11/24/19 7015

## 2019-11-25 ENCOUNTER — APPOINTMENT (OUTPATIENT)
Dept: MRI IMAGING | Facility: HOSPITAL | Age: 54
End: 2019-11-25

## 2019-11-25 VITALS
HEIGHT: 78 IN | WEIGHT: 315 LBS | BODY MASS INDEX: 36.45 KG/M2 | HEART RATE: 61 BPM | TEMPERATURE: 98 F | RESPIRATION RATE: 18 BRPM | SYSTOLIC BLOOD PRESSURE: 139 MMHG | OXYGEN SATURATION: 93 % | DIASTOLIC BLOOD PRESSURE: 77 MMHG

## 2019-11-25 PROCEDURE — 72148 MRI LUMBAR SPINE W/O DYE: CPT

## 2019-11-25 PROCEDURE — G0378 HOSPITAL OBSERVATION PER HR: HCPCS

## 2019-11-25 PROCEDURE — 99213 OFFICE O/P EST LOW 20 MIN: CPT | Performed by: NURSE PRACTITIONER

## 2019-11-25 RX ORDER — LIDOCAINE 50 MG/G
1 PATCH TOPICAL EVERY 24 HOURS
Qty: 15 EACH | Refills: 0 | Status: SHIPPED | OUTPATIENT
Start: 2019-11-25

## 2019-11-25 RX ORDER — OXYCODONE AND ACETAMINOPHEN 7.5; 325 MG/1; MG/1
TABLET ORAL
Qty: 40 TABLET | Refills: 0 | Status: SHIPPED | OUTPATIENT
Start: 2019-11-25 | End: 2019-11-27 | Stop reason: SDUPTHER

## 2019-11-25 RX ORDER — METAXALONE 800 MG/1
800 TABLET ORAL 3 TIMES DAILY PRN
Qty: 30 TABLET | Refills: 0 | Status: SHIPPED | OUTPATIENT
Start: 2019-11-25 | End: 2021-11-12

## 2019-11-25 RX ORDER — HYDROCODONE BITARTRATE AND ACETAMINOPHEN 5; 325 MG/1; MG/1
1 TABLET ORAL EVERY 6 HOURS PRN
COMMUNITY
End: 2019-11-25 | Stop reason: HOSPADM

## 2019-11-25 RX ADMIN — OXYCODONE HYDROCHLORIDE AND ACETAMINOPHEN 2 TABLET: 7.5; 325 TABLET ORAL at 00:41

## 2019-11-25 RX ADMIN — OXYCODONE HYDROCHLORIDE AND ACETAMINOPHEN 1 TABLET: 7.5; 325 TABLET ORAL at 09:01

## 2019-11-25 RX ADMIN — OXYCODONE HYDROCHLORIDE AND ACETAMINOPHEN 1 TABLET: 7.5; 325 TABLET ORAL at 10:50

## 2019-11-25 RX ADMIN — OXYCODONE HYDROCHLORIDE AND ACETAMINOPHEN 2 TABLET: 7.5; 325 TABLET ORAL at 04:51

## 2019-11-25 RX ADMIN — OXYCODONE HYDROCHLORIDE AND ACETAMINOPHEN 2 TABLET: 7.5; 325 TABLET ORAL at 15:02

## 2019-11-25 RX ADMIN — SODIUM CHLORIDE, PRESERVATIVE FREE 10 ML: 5 INJECTION INTRAVENOUS at 09:02

## 2019-11-25 RX ADMIN — METFORMIN HYDROCHLORIDE 500 MG: 500 TABLET ORAL at 09:01

## 2019-11-25 NOTE — DISCHARGE SUMMARY
[facility] Medicine Services  DISCHARGE SUMMARY       Date of Admission: 11/24/2019  Date of Discharge:  11/25/2019  Primary Care Physician: Hemal Gutierrez MD    Discharge Diagnoses:  Patient Active Problem List   Diagnosis   • Compression fracture of L1 lumbar vertebra (CMS/HCC)   • Diabetes mellitus (CMS/[facility])   • Morbid obesity (CMS/HCC)   • Intractable back pain         Presenting Problem/History of Present Illness:  Compression fracture of L1 vertebra, initial encounter (CMS/[facility]) [S32.010A]     Chief Complaint on Day of Discharge:   Back pain    History of Present Illness on Day of Discharge:   The patient is feeling better today.  He states that his pain is at a 4/10 level with pain medications.  Pain increases to an 8/10 without pain medication.  He complains of some muscle spasm and will be given a prescription for a muscle relaxant at the time of discharge.  MRI scan of the lumbar spine is been performed showing a compression fracture of L1 at 10% but no free fragments or retropulsion of fragments was noted and no spinal instability was noted.  The patient is appropriate for discharge home and will follow up with his family physician in 1 week and with Dr. Weaver's office in 3 weeks.    Hospital Course  This 54-year-old white male was admitted with a chief complaint of severe low back discomfort after a fall he incurred at home this morning at about 5:30.  The patient awakened this morning early in order to let his dog out and slipped on an icy wooden ramp at his home at about 5:30 this morning landing in a sitting position on his buttocks.  He encountered immediate and severe, debilitating back discomfort and subsequently went to the [facility] emergency department where x-rays were taken and he was treated and discharged with oral pain medication.  The [facility] emergency department called him back on the same day recommending he return for a CT scan for a  possible fracture that was noted by the radiologist on plain film x-rays.  The patient came to our facility instead, for further evaluation.  The patient feels much better when he is lying flat on his back but has very significant and very limiting pain when sitting up or standing.  His pain has not been controlled without IV pain medication to this point.  Neurosurgery was consulted in the emergency department and evaluated the patient's CT films and saw the patient in the emergency department.  Recommendations were for maximal analgesics, TLSO, rest and follow-up with Dr. Weaver in his clinic next week.  Kyphoplasty was not indicated given the traumatic nature of the fracture in a non-osteoporotic patient.     CMP was obtained in the ER and was unremarkable.  White blood cell count is reactively elevated at 12,400.  INR and PTT within normal limits.  PLAN:   Admit to observation  Lidoderm topical patch to lumbar region  Oral pain medications  Ambulate with assistance  Probable discharge tomorrow when pain control is achieved  Neurosurgery to follow-up while inpatient    The patient is feeling better today.  He states that his pain is at a 4/10 level with pain medications.  Pain increases to an 8/10 without pain medication.  He complains of some muscle spasm and will be given a prescription for a muscle relaxant at the time of discharge.  MRI scan of the lumbar spine is been performed showing a compression fracture of L1 at 10% but no free fragments or retropulsion of fragments was noted and no spinal instability was noted.  The patient is appropriate for discharge home and will follow up with his family physician in 1 week and with Dr. Weaver's office in 3 weeks.    Consults:   Neurosurgery:  Contacted from the emergency room.  Patient is a morbidly obese 54-year-old male who sustained a fall at home.  He had acute onset of back pain without radiation, numbness, weakness.  He has pain that is worsening with  standing and relieved with laying down.     His imaging is reviewed remotely which shows an L1 10% compression fracture without evidence of instability.     Recommend maximal analgesics  TLSO  Rest and patient may follow-up with me in clinic next week  Given that this is a traumatic fracture in a non-osteoporotic patient kyphoplasty is not indicated.     Stephen Weaver MD    Pertinent Test Results:   Lab Results (last 7 days)     Procedure Component Value Units Date/Time    [place] Draw [[id number]] Collected:  11/24/19 1334    Specimen:  Blood Updated:  11/24/19 1446    Narrative:       The following orders were created for panel order [place] Draw.  Procedure                               Abnormality         Status                     ---------                               -----------         ------                     Light Blue Top[[id number]]                                   Final result               Green Top (Gel)[[id number]]                                  Final result               Lavender Top[[id number]]                                     Final result               Red Top[[id number]]                                          Final result                 Please view results for these tests on the individual orders.    Light Blue Top [[id number]] Collected:  11/24/19 1334    Specimen:  Blood Updated:  11/24/19 1446     Extra Tube hold for add-on     Comment: Auto resulted       Green Top (Gel) [[id number]] Collected:  11/24/19 1334    Specimen:  Blood Updated:  11/24/19 1446     Extra Tube Hold for add-ons.     Comment: Auto resulted.       Lavender Top [[id number]] Collected:  11/24/19 1334    Specimen:  Blood Updated:  11/24/19 1446     Extra Tube hold for add-on     Comment: Auto resulted       Red Top [[id number]] Collected:  11/24/19 1334    Specimen:  Blood Updated:  11/24/19 1446     Extra Tube Hold for add-ons.     Comment: Auto resulted.       Comprehensive Metabolic Panel [[id number]]   (Abnormal) Collected:  11/24/19 1334    Specimen:  Blood Updated:  11/24/19 1409     Glucose 145 mg/dL      BUN 10 mg/dL      Creatinine 0.79 mg/dL      Sodium 137 mmol/L      Potassium 4.4 mmol/L      Chloride 100 mmol/L      CO2 27.0 mmol/L      Calcium 9.7 mg/dL      Total Protein 6.3 g/dL      Albumin 4.10 g/dL      ALT (SGPT) 48 U/L      AST (SGOT) 35 U/L      Comment: Specimen hemolyzed.  Results may be affected.        Alkaline Phosphatase 56 U/L      Total Bilirubin 0.3 mg/dL      eGFR Non African Amer 103 mL/min/1.73      Globulin 2.2 gm/dL      A/G Ratio 1.9 g/dL      BUN/Creatinine Ratio 12.7     Anion Gap 10.0 mmol/L     Narrative:       GFR Normal >60  Chronic Kidney Disease <60  Kidney Failure <15    Protime-INR [[id number]]  (Normal) Collected:  11/24/19 1334    Specimen:  Blood Updated:  11/24/19 1350     Protime 13.6 Seconds      INR 1.01    aPTT [[id number]]  (Normal) Collected:  11/24/19 1334    Specimen:  Blood Updated:  11/24/19 1350     PTT 27.7 seconds     CBC & Differential [[id number]] Collected:  11/24/19 1334    Specimen:  Blood Updated:  11/24/19 1342    Narrative:       The following orders were created for panel order CBC & Differential.  Procedure                               Abnormality         Status                     ---------                               -----------         ------                     CBC Auto Differential[[id number]]        Abnormal            Final result                 Please view results for these tests on the individual orders.    CBC Auto Differential [[id number]]  (Abnormal) Collected:  11/24/19 1334    Specimen:  Blood Updated:  11/24/19 1342     WBC 12.40 10*3/mm3      RBC 5.45 10*6/mm3      Hemoglobin 14.9 g/dL      Hematocrit 44.4 %      MCV 81.5 fL      MCH 27.3 pg      MCHC 33.6 g/dL      RDW 13.8 %      RDW-SD 40.1 fl      MPV 10.6 fL      Platelets 229 10*3/mm3      Neutrophil % 66.8 %      Lymphocyte % 22.0 %      Monocyte % 8.1 %      Eosinophil %  1.8 %      Basophil % 0.6 %      Immature Grans % 0.7 %      Neutrophils, Absolute 8.27 10*3/mm3      Lymphocytes, Absolute 2.73 10*3/mm3      Monocytes, Absolute 1.01 10*3/mm3      Eosinophils, Absolute 0.22 10*3/mm3      Basophils, Absolute 0.08 10*3/mm3      Immature Grans, Absolute 0.09 10*3/mm3      nRBC 0.0 /100 WBC         Imaging Results (Last 7 Days)     Procedure Component Value Units Date/Time    MRI Lumbar Spine Without Contrast [[id number]] Collected:  11/25/19 1452     Updated:  11/25/19 1505    Narrative:       EXAM: MR LUMBOSACRAL SPINE WITHOUT IV CONTRAST 11/25/2019     COMPARISON: L1 compression fracture      INDICATION: 54 years-old Male.  Further evaluation of L1 compression  fracture.; S32.010A-Wedge compression fracture of first lumbar vertebra,  initial encounter for closed fracture; R52-Pain, unspecified     TECHNIQUE:   Routine pulse sequences of the lumbar spine were obtained without IV  contrast.      FINDINGS:         There is acute fracture involving the upper endplate of the L1 vertebral  body, with both anterior and posterior cortical disruption of the  vertebral body. There is approximately 10 % height loss. There is no  retropulsion. No significant spinal canal stenosis. No epidural hematoma  The conus terminates at L1. No abnormal signal within the visualized  distal cord.     There is no significant spinal canal stenosis. No additional compression  deformity. No dislocation. No marrow replacing process.          Impression:       L1 acute 2 column fracture without retropulsion or significant spinal  canal stenosis.  This report was finalized on 11/25/2019 15:02 by Dr. Rosanna Aponte MD.    CT Lumbar Spine Without Contrast [[id number]] Collected:  11/24/19 1437     Updated:  11/24/19 1443    Narrative:       CT LUMBAR SPINE WO CONTRAST- 11/24/2019 2:27 PM CST     HISTORY: acute fall. Possible fracture of L1 on other x-rays at another  hospital.     COMPARISON: None      DOSE LENGTH  "PRODUCT: 1259 mGy cm. Automated exposure control was also  utilized to decrease patient radiation dose.     TECHNIQUE: Helical tomographic images of the lumbar spine were obtained  without the use of contrast. Multiplanar reformatted images were  provided for review.      FINDINGS:   Alignment: Normal.     Bones: There is an acute fracture through the superior endplate of L1.  Minimal height loss is noted. The fracture extends into the LEFT  posterior aspect of the vertebral body but does not extend into the  pedicle. No other fractures are identified.     Disc spaces: Mild disc space narrowing is present at L1-L2 and L2-L3.  There are small disc bulges at L4-L5 and L5-S1.     Canal and neuroforamina: Moderate facet hypertrophy is present at L4-L5,  resulting in moderate bony narrowing of the neuroforamina. There is no  bony narrowing of the spinal canal.     Soft tissues: Unremarkable.       Impression:       1. Acute, comminuted compression fracture through the superior endplate  of L1 that extends into the posterior aspect of the vertebral body but  does not extend into the pedicles. Approximately 10% vertebral body  height loss.  2. Mild to moderate degenerative changes.        This report was finalized on 11/24/2019 14:40 by Dr. Les Young MD.              Condition on Discharge:    Stable    Physical Exam on Discharge:  /77 (BP Location: Right arm, Patient Position: Lying)   Pulse 61   Temp 98 °F (36.7 °C) (Oral)   Resp 18   Ht 200.7 cm (79\")   Wt (!) 177 kg (391 lb)   SpO2 93%   BMI 44.05 kg/m²   Physical Exam  Constitutional: He is oriented to person, place, and time. He appears well-developed and well-nourished. He is cooperative. No distress.   HENT:   Head: Normocephalic and atraumatic.   Right Ear: External ear normal.   Left Ear: External ear normal.   Mouth/Throat: Oropharynx is clear and moist.   Eyes: Conjunctivae and EOM are normal. Pupils are equal, round, and reactive to light. No " scleral icterus.   Neck: Normal range of motion. Neck supple. No JVD present.   Cardiovascular: Normal rate, regular rhythm, normal heart sounds and intact distal pulses.   No murmur heard.  Pulmonary/Chest: Effort normal and breath sounds normal. No respiratory distress.   Abdominal: Soft. Bowel sounds are normal. He exhibits no mass. There is no tenderness.   Musculoskeletal: He exhibits tenderness ( Upper lumbar region). He exhibits no edema or deformity.        Lumbar back: He exhibits decreased range of motion, tenderness, bony tenderness, pain and spasm.   Neurological: He is alert and oriented to person, place, and time. He displays normal reflexes. No cranial nerve deficit. Coordination normal.   Skin: Skin is warm and dry. No erythema.   Psychiatric: He has a normal mood and affect. His behavior is normal.     Discharge Disposition:  Home or Self Care    Discharge Medications:     Discharge Medications      New Medications      Instructions Start Date   lidocaine 5 %  Commonly known as:  LIDODERM   1 patch, Transdermal, Every 24 Hours, Remove & Discard patch within 12 hours or as directed by MD      metaxalone 800 MG tablet  Commonly known as:  SKELAXIN   800 mg, Oral, 3 Times Daily PRN      oxyCODONE-acetaminophen 7.5-325 MG per tablet  Commonly known as:  PERCOCET   1 to 2 tablets every 4 hours as needed for pain         Continue These Medications      Instructions Start Date   diclofenac 1 % gel gel  Commonly known as:  VOLTAREN   4 g, Topical, 4 Times Daily PRN      meloxicam 15 MG tablet  Commonly known as:  MOBIC   15 mg, Oral, Daily      metFORMIN 500 MG tablet  Commonly known as:  GLUCOPHAGE   500 mg, Oral, Daily With Breakfast      sildenafil 100 MG tablet  Commonly known as:  VIAGRA   100 mg, Oral, Daily PRN         Stop These Medications    HYDROcodone-acetaminophen 5-325 MG per tablet  Commonly known as:  NORCO            Discharge Diet:   Diet Instructions     Diet: Regular; Thin      Discharge  Diet:  Regular    Fluid Consistency:  Thin          Discharge Care Plan / Instructions:   Discharge home    Activity at Discharge:   Activity Instructions     Activity as Tolerated            Follow-up Appointments:  Follow-up with Dr. Gutierrez in 1 week  Follow-up with Dr. Weaver in 3 weeks       Robert Frazier DO  11/25/19  3:19 PM    Time: Discharge Less than 30 min    Please note that portions of this note may have been completed with a voice recognition program. Efforts were made to edit the dictations, but occasionally words are mistranscribed.

## 2019-11-25 NOTE — PAYOR COMM NOTE
"ADMIT INPT 11-24-19  [id number]  UR PHONE    [phone]        Gordy Erwin (54 y.o. Male)     Date of Birth Social Security Number Address Home Phone MRN    1965  [address]  [place] [address] [phone] [id number]    Rastafari Marital Status          Voodoo        Admission Date Admission Type Admitting Provider Attending Provider Department, Room/Bed    11/24/19 Emergency Robert Frazier DO Robinson, Maurice S, DO [facility] 3A, 345/1    Discharge Date Discharge Disposition Discharge Destination                       Attending Provider:  Robert Frazier DO    Allergies:  Penicillins, Contrast Dye, Sulfa Antibiotics, Toradol [Ketorolac Tromethamine]    Isolation:  None   Infection:  None   Code Status:  CPR    Ht:  200.7 cm (79\")   Wt:  177 kg (391 lb)    Admission Cmt:  None   Principal Problem:  Intractable back pain [M54.9]                 Active Insurance as of 11/24/2019     Primary Coverage     Payor Plan Insurance Group Employer/Plan Group    [facility] [employer] BLUE CR[employer] EMPLOYEE [id number]     Payor Plan Address Payor Plan Phone Number Payor Plan Fax Number Effective Dates    [address] [phone]  1/1/2015 - None Entered    [place] [address]       Subscriber Name Subscriber Birth Date Member ID       GORDY ERWIN 1965 [id number]                 Emergency Contacts      (Rel.) Home Phone Work Phone Mobile Phone    Nanda Erwin (Spouse) [phone] -- [phone]               History & Physical      Robert Frazier DO at 11/24/19 [facility]              [facility] Medicine Services  HISTORY AND PHYSICAL    Date of Admission: 11/24/2019  Primary Care Physician: Hemal Gutierrez MD    Subjective     Chief Complaint:   Low back discomfort    History of Present Illness    This 54-year-old white male was admitted with a chief complaint of severe low back " discomfort after a fall he incurred at home this morning at about 5:30.  The patient awakened this morning early in order to let his dog out and slipped on an icy wooden ramp at his home at about 5:30 this morning landing in a sitting position on his buttocks.  He encountered immediate and severe, debilitating back discomfort and subsequently went to the [facility] emergency department where x-rays were taken and he was treated and discharged with oral pain medication.  The [facility] emergency department called him back on the same day recommending he return for a CT scan for a possible fracture that was noted by the radiologist on plain film x-rays.  The patient came to our facility instead, for further evaluation.  The patient feels much better when he is lying flat on his back but has very significant and very limiting pain when sitting up or standing.  His pain has not been controlled without IV pain medication to this point.  Neurosurgery was consulted in the emergency department and evaluated the patient's CT films and saw the patient in the emergency department.  Recommendations were for maximal analgesics, TLSO, rest and follow-up with Dr. Weaver in his clinic next week.  Kyphoplasty was not indicated given the traumatic nature of the fracture in a non-osteoporotic patient.    CMP was obtained in the ER and was unremarkable.  White blood cell count is reactively elevated at 12,400.  INR and PTT within normal limits.    Review of Systems   Constitutional: Positive for activity change.   HENT: Negative.    Eyes: Negative.    Respiratory: Negative.    Cardiovascular: Negative.    Gastrointestinal: Negative.    Endocrine: Negative.    Genitourinary: Negative.    Musculoskeletal: Positive for back pain and gait problem.   Skin: Negative.    Allergic/Immunologic: Negative.    Hematological: Negative.    Psychiatric/Behavioral: Negative.         Otherwise complete ROS reviewed and negative except as mentioned  "in the HPI.      Past Medical History:     Past Medical History:   Diagnosis Date   • Diabetes mellitus (CMS/HCC)        Past Surgical History:  Past Surgical History:   Procedure Laterality Date   • FOOT SURGERY Right    • GANGLION CYST EXCISION         Family History:   family history includes Bipolar disorder in his father; Heart disease in his mother; No Known Problems in his brother; Osteoporosis in his mother; Suicidality in his father.    Social History:    reports that he quit smoking about 7 years ago. He has never used smokeless tobacco. He reports that he does not drink alcohol or use drugs.    Medications:  Prior to Admission medications    Medication Sig Start Date End Date Taking? Authorizing Provider   diclofenac (VOLTAREN) 1 % gel gel Apply 4 g topically to the appropriate area as directed 4 (Four) Times a Day As Needed.   Yes Jerica Celis MD   meloxicam (MOBIC) 15 MG tablet Take 15 mg by mouth. 4/5/19  Yes Jerica Celis MD   diclofenac (VOLTAREN) 75 MG EC tablet Take 1 tablet by mouth 2 (Two) Times a Day. 10/2/17   Luis Antonio Mccall PA-C   metFORMIN (GLUCOPHAGE) 500 MG tablet Take 500 mg by mouth Daily With Breakfast. 6/25/19   Jerica Celis MD   sildenafil (VIAGRA) 100 MG tablet Take 100 mg by mouth. 7/15/19   ProviderJerica MD       Allergies:  Allergies   Allergen Reactions   • Penicillins Shortness Of Breath   • Contrast Dye Nausea Only   • Sulfa Antibiotics Hives   • Toradol [Ketorolac Tromethamine] Hives and Itching       Objective     Vital Signs:   /69   Pulse 61   Temp 98.7 °F (37.1 °C) (Oral)   Resp 13   Ht 200.7 cm (79\")   Wt (!) 177 kg (391 lb)   SpO2 95%   BMI 44.05 kg/m²      Physical Exam   Constitutional: He is oriented to person, place, and time. He appears well-developed and well-nourished. He is cooperative. No distress.   HENT:   Head: Normocephalic and atraumatic.   Right Ear: External ear normal.   Left Ear: External ear normal.   "   Mouth/Throat: Oropharynx is clear and moist.   Eyes: Conjunctivae and EOM are normal. Pupils are equal, round, and reactive to light. No scleral icterus.   Neck: Normal range of motion. Neck supple. No JVD present.   Cardiovascular: Normal rate, regular rhythm, normal heart sounds and intact distal pulses.   No murmur heard.  Pulmonary/Chest: Effort normal and breath sounds normal. No respiratory distress.   Abdominal: Soft. Bowel sounds are normal. He exhibits no mass. There is no tenderness.   Musculoskeletal: He exhibits tenderness ( Upper lumbar region). He exhibits no edema or deformity.        Lumbar back: He exhibits decreased range of motion, tenderness, bony tenderness, pain and spasm.   Neurological: He is alert and oriented to person, place, and time. He displays normal reflexes. No cranial nerve deficit. Coordination normal.   Skin: Skin is warm and dry. No erythema.   Psychiatric: He has a normal mood and affect. His behavior is normal.         Results Reviewed:  Lab Results (last 24 hours)     Procedure Component Value Units Date/Time    [place] Draw [[id number]] Collected:  11/24/19 1334    Specimen:  Blood Updated:  11/24/19 1446    Narrative:       The following orders were created for panel order [place] Draw.  Procedure                               Abnormality         Status                     ---------                               -----------         ------                     Light Blue Top[[id number]]                                   Final result               Green Top (Gel)[[id number]]                                  Final result               Lavender Top[[id number]]                                     Final result               Red Top[[id number]]                                          Final result                 Please view results for these tests on the individual orders.    Light Blue Top [[id number]] Collected:  11/24/19 1334    Specimen:  Blood Updated:  11/24/19 1446     Extra  Tube hold for add-on     Comment: Auto resulted       Green Top (Gel) [[id number]] Collected:  11/24/19 1334    Specimen:  Blood Updated:  11/24/19 1446     Extra Tube Hold for add-ons.     Comment: Auto resulted.       Lavender Top [[id number]] Collected:  11/24/19 1334    Specimen:  Blood Updated:  11/24/19 1446     Extra Tube hold for add-on     Comment: Auto resulted       Red Top [[id number]] Collected:  11/24/19 1334    Specimen:  Blood Updated:  11/24/19 1446     Extra Tube Hold for add-ons.     Comment: Auto resulted.       Comprehensive Metabolic Panel [[id number]]  (Abnormal) Collected:  11/24/19 1334    Specimen:  Blood Updated:  11/24/19 1409     Glucose 145 mg/dL      BUN 10 mg/dL      Creatinine 0.79 mg/dL      Sodium 137 mmol/L      Potassium 4.4 mmol/L      Chloride 100 mmol/L      CO2 27.0 mmol/L      Calcium 9.7 mg/dL      Total Protein 6.3 g/dL      Albumin 4.10 g/dL      ALT (SGPT) 48 U/L      AST (SGOT) 35 U/L      Comment: Specimen hemolyzed.  Results may be affected.        Alkaline Phosphatase 56 U/L      Total Bilirubin 0.3 mg/dL      eGFR Non African Amer 103 mL/min/1.73      Globulin 2.2 gm/dL      A/G Ratio 1.9 g/dL      BUN/Creatinine Ratio 12.7     Anion Gap 10.0 mmol/L     Narrative:       GFR Normal >60  Chronic Kidney Disease <60  Kidney Failure <15    Protime-INR [[id number]]  (Normal) Collected:  11/24/19 1334    Specimen:  Blood Updated:  11/24/19 1350     Protime 13.6 Seconds      INR 1.01    aPTT [[id number]]  (Normal) Collected:  11/24/19 1334    Specimen:  Blood Updated:  11/24/19 1350     PTT 27.7 seconds     CBC & Differential [[id number]] Collected:  11/24/19 1334    Specimen:  Blood Updated:  11/24/19 1342    Narrative:       The following orders were created for panel order CBC & Differential.  Procedure                               Abnormality         Status                     ---------                               -----------         ------                     CBC Auto  Differential[[id number]]        Abnormal            Final result                 Please view results for these tests on the individual orders.    CBC Auto Differential [[id number]]  (Abnormal) Collected:  11/24/19 1334    Specimen:  Blood Updated:  11/24/19 1342     WBC 12.40 10*3/mm3      RBC 5.45 10*6/mm3      Hemoglobin 14.9 g/dL      Hematocrit 44.4 %      MCV 81.5 fL      MCH 27.3 pg      MCHC 33.6 g/dL      RDW 13.8 %      RDW-SD 40.1 fl      MPV 10.6 fL      Platelets 229 10*3/mm3      Neutrophil % 66.8 %      Lymphocyte % 22.0 %      Monocyte % 8.1 %      Eosinophil % 1.8 %      Basophil % 0.6 %      Immature Grans % 0.7 %      Neutrophils, Absolute 8.27 10*3/mm3      Lymphocytes, Absolute 2.73 10*3/mm3      Monocytes, Absolute 1.01 10*3/mm3      Eosinophils, Absolute 0.22 10*3/mm3      Basophils, Absolute 0.08 10*3/mm3      Immature Grans, Absolute 0.09 10*3/mm3      nRBC 0.0 /100 WBC           Ct Lumbar Spine Without Contrast    Result Date: 11/24/2019  Narrative: CT LUMBAR SPINE WO CONTRAST- 11/24/2019 2:27 PM CST  HISTORY: acute fall. Possible fracture of L1 on other x-rays at another hospital.  COMPARISON: None  DOSE LENGTH PRODUCT: 1259 mGy cm. Automated exposure control was also utilized to decrease patient radiation dose.  TECHNIQUE: Helical tomographic images of the lumbar spine were obtained without the use of contrast. Multiplanar reformatted images were provided for review.  FINDINGS: Alignment: Normal.  Bones: There is an acute fracture through the superior endplate of L1. Minimal height loss is noted. The fracture extends into the LEFT posterior aspect of the vertebral body but does not extend into the pedicle. No other fractures are identified.  Disc spaces: Mild disc space narrowing is present at L1-L2 and L2-L3. There are small disc bulges at L4-L5 and L5-S1.  Canal and neuroforamina: Moderate facet hypertrophy is present at L4-L5, resulting in moderate bony narrowing of the neuroforamina.  There is no bony narrowing of the spinal canal.  Soft tissues: Unremarkable.      Impression: 1. Acute, comminuted compression fracture through the superior endplate of L1 that extends into the posterior aspect of the vertebral body but does not extend into the pedicles. Approximately 10% vertebral body height loss. 2. Mild to moderate degenerative changes.   This report was finalized on 11/24/2019 14:40 by Dr. Les Young MD.     I have personally reviewed and interpreted the radiology studies and ECG obtained at time of admission.     Assessment / Plan      Assessment & Plan  Active Hospital Problems    Diagnosis   • **Intractable back pain   • Compression fracture of L1 lumbar vertebra (CMS/HCC)   • Diabetes mellitus (CMS/HCC)   • Morbid obesity (CMS/HCC)       PLAN:   Admit to observation  Lidoderm topical patch to lumbar region  Oral pain medications  Ambulate with assistance  Probable discharge tomorrow when pain control is achieved  Neurosurgery to follow-up while inpatient    Code Status:   Full code  Surrogate decision makers the patient's wife     I discussed the patient's findings and my recommendations with: The patient and his wife    Estimated length of stay: 1 day    Robert Frazier DO   11/24/19   4:55 PM                Electronically signed by Robert Fraizer DO at 11/24/19 1712          Emergency Department Notes      Benoit Ibrahim DO at 11/24/19 1321          Subjective   This 54-year-old male patient went outside this morning and slipped on icy wooden ramp landed in a sitting position.  About 5:45 in the morning he states he had immediate pain to his lower back area and felt very nauseated.  They went to [facility] and were seen and had x-rays done.  The emergency physician discharge them with Percocet and told him that he did not see any fracture.  About an hour after he got home they received a phone call from the [facility] stating that he needs  to come back for CT scan.  They discovered that he had a possible fracture of L1.  They were not very happy with lives in [facility] stated they called ambulance and they were brought here.  Patient continues to have severe pain in his lower back which is much worse if he tries to sit up at all or walk.  He has no pain radiating down the legs.  No bowel or bladder dysfunction.  When he lays flat the pain is not too bad.            Review of Systems   Constitutional: Positive for activity change.   HENT: Negative.    Eyes: Negative.    Respiratory: Negative.    Cardiovascular: Negative.    Gastrointestinal: Negative.    Genitourinary: Negative.    Musculoskeletal: Positive for back pain. Negative for neck pain.        He has acute severe lower back pain.  He has good motion of his hips knees and ankles and no radiation down the lower extremities.   Neurological:        Patient states when he fell he landed in the sitting position he did not hit his head and he has no headache or neck pain.   Psychiatric/Behavioral: Negative.        Past Medical History:   Diagnosis Date   • Diabetes mellitus (CMS/[facility])        Allergies   Allergen Reactions   • Penicillins Shortness Of Breath   • Contrast Dye Nausea Only   • Sulfa Antibiotics Hives   • Toradol [Ketorolac Tromethamine] Hives and Itching       Past Surgical History:   Procedure Laterality Date   • FOOT SURGERY Right        History reviewed. No pertinent family history.    Social History     Socioeconomic History   • Marital status:      Spouse name: Not on file   • Number of children: Not on file   • Years of education: Not on file   • Highest education level: Not on file   Tobacco Use   • Smoking status: Former Smoker     Last attempt to quit: 10/2/2012     Years since quittin.1   • Smokeless tobacco: Never Used   Substance and Sexual Activity   • Alcohol use: No     Comment: rarely   • Drug use: No           Objective   Physical Exam   Constitutional:  He is oriented to person, place, and time. He appears well-developed and well-nourished.   HENT:   Head: Normocephalic and atraumatic.   Eyes: EOM are normal. Pupils are equal, round, and reactive to light.   Neck: Normal range of motion. Neck supple.   Cardiovascular: Normal rate, regular rhythm and normal heart sounds.   Pulmonary/Chest: Effort normal and breath sounds normal.   Abdominal: Soft. Bowel sounds are normal.   Musculoskeletal:   He is tender in the upper lumbar region.   Neurological: He is alert and oriented to person, place, and time.   Skin: Skin is warm and dry.   Nursing note and vitals reviewed.      Procedures          ED Course                  MDM  Number of Diagnoses or Management Options  Diagnosis management comments: 1500 CT scan does indeed show a comminuted fracture of the body of L1 with no evidence of posterior retropulsion toward the spinal cord.  Dr. Weaver was called and recommended a brace and pain medication.  1530 Dr. Weaver was present in the department and the patient and they seem to be willing to try to go home if they can get the brace on.  1600 The patient has continued intractable pain despite having multiple doses of narcotic medication he still cannot get up to put on the brace which was recommended by Dr. Weaver the neurosurgeon.  The case was then discussed with Dr. Frazier who agrees to admit the patient for observation.        Final diagnoses:   Compression fracture of L1 vertebra, initial encounter (CMS/[facility])   Intractable pain              Benoit Ibrahim DO  11/24/19 1609      Electronically signed by Benoit Ibrahim DO at 11/24/19 1609     Mike Huddleston RN at 11/24/19 1415        Dr. Ibrahim notified pt still c/o pain after morphine     Mike Huddleston RN  11/24/19 1419      Electronically signed by Mike Huddleston RN at 11/24/19 1419     Mike Huddleston RN at 11/24/19 1540        Hugo LEAVITT and Ana MARTINEZ at bedside to  attempt to apply TSLO brace.  Pt not tolerate movement and c/o pain 10/10.  Dr Ibrahim notified.  Ok to repeat dilaudid per Mike Alcala, RN  11/24/19 1547      Electronically signed by Mike Huddleston RN at 11/24/19 1547       Vital Signs (last 3 days)     Date/Time   Temp   Temp src   Pulse   Resp   BP   Patient Position   SpO2    11/25/19 0731   97.9 (36.6)   Oral   60   16   126/69   Lying   92    11/25/19 0400   98 (36.7)   Oral   61   16   120/56   Lying   91    11/24/19 2327   98.4 (36.9)   Oral   70   16   159/70 nurse notified   Lying   92    BP: nurse notified at 11/24/19 2327 11/24/19 2059   98.4 (36.9)   Oral   64   16   148/74   Lying   92    11/24/19 1708   98.3 (36.8)   Oral   62   16   171/74   Lying   93    11/24/19 16:17:52   98.7 (37.1)   Oral   —   —   —   —   —    11/24/19 1615   —   —   61   —   143/69   —   —    11/24/19 1611   —   —   59   —   —   —   95    11/24/19 1515   —   —   —   —   139/83   —   92    11/24/19 1445   —   —   60   —   149/64   —   93    11/24/19 1437   —   —   56   —   142/69   —   97    11/24/19 1415   —   —   61   13   140/72   —   96    11/24/19 1400   —   —   —   —   137/69   —   93    11/24/19 1356   —   —   62   —   —   —   95    11/24/19 1300   98.3 (36.8)   Oral   54   15   154/72   Lying   95              Oxygen Therapy (last 7 days)     Date/Time   SpO2   Device (Oxygen Therapy)   Flow (L/min)   Oxygen Concentration (%)   ETCO2 (mmHg)    11/25/19 0731   92   room air   —   —   —    11/25/19 0723   —   room air   —   —   —    11/25/19 0400   91   room air   —   —   —    11/24/19 2327   92   room air   —   —   —    11/24/19 2059   92   room air   —   —   —    11/24/19 2006   —   room air   —   —   —    11/24/19 1708   93   room air   —   —   —    11/24/19 1611   95   —   —   —   —    11/24/19 1515   92   —   —   —   —    11/24/19 1445   93   —   —   —   —    11/24/19 1437   97   —   —   —   —    11/24/19 1415   96   —   —   —    —    11/24/19 1400   93   —   —   —   —    11/24/19 1356   95   —   —   —   —    11/24/19 1300   95   room air   —   —   —            Intake & Output (last 3 days)       11/22 0701 - 11/23 0700 11/23 0701 - 11/24 0700 11/24 0701 - 11/25 0700 11/25 0701 - 11/26 0700    Urine (mL/kg/hr)   400     Total Output   400     Net   -400                  Lines, Drains & Airways    Active LDAs     Name:   Placement date:   Placement time:   Site:   Days:    Peripheral IV 11/24/19 1330 Left Hand   11/24/19 1330    Hand   less than 1         Inactive LDAs     None                Hospital Medications (all)       Dose Frequency Start End    aluminum-magnesium hydroxide-simethicone (MAALOX MAX) 400-400-40 MG/5ML suspension 15 mL 15 mL Every 6 Hours PRN 11/24/2019     Sig - Route: Take 15 mL by mouth Every 6 (Six) Hours As Needed for Heartburn. - Oral    HYDROmorphone (DILAUDID) injection solution 1 mg 1 mg Once 11/24/2019 11/24/2019    Sig - Route: Infuse 1 mL into a venous catheter 1 (One) Time. - Intravenous    HYDROmorphone (DILAUDID) injection solution 1 mg 1 mg Once 11/24/2019 11/24/2019    Sig - Route: Infuse 1 mL into a venous catheter 1 (One) Time. - Intravenous    HYDROmorphone (DILAUDID) injection solution 1 mg 1 mg Once 11/24/2019 11/24/2019    Sig - Route: Infuse 1 mL into a venous catheter 1 (One) Time. - Intravenous    lidocaine (LIDODERM) 5 % 1 patch 1 patch Every 24 Hours 11/24/2019     Sig - Route: Place 1 patch on the skin as directed by provider Daily. - Transdermal    metFORMIN (GLUCOPHAGE) tablet 500 mg 500 mg Daily With Breakfast 11/25/2019     Sig - Route: Take 1 tablet by mouth Daily With Breakfast. - Oral    morphine injection 4 mg 4 mg Once 11/24/2019 11/24/2019    Sig - Route: Infuse 1 mL into a venous catheter 1 (One) Time. - Intravenous    ondansetron (ZOFRAN) injection 4 mg 4 mg Once 11/24/2019 11/24/2019    Sig - Route: Infuse 2 mL into a venous catheter 1 (One) Time. - Intravenous    ondansetron  "(ZOFRAN) injection 4 mg 4 mg Every 6 Hours PRN 11/24/2019     Sig - Route: Infuse 2 mL into a venous catheter Every 6 (Six) Hours As Needed for Nausea or Vomiting. - Intravenous    oxyCODONE-acetaminophen (PERCOCET) 7.5-325 MG per tablet 1 tablet 1 tablet Every 4 Hours PRN 11/24/2019 12/4/2019    Sig - Route: Take 1 tablet by mouth Every 4 (Four) Hours As Needed for Moderate Pain . - Oral    oxyCODONE-acetaminophen (PERCOCET) 7.5-325 MG per tablet 2 tablet 2 tablet Every 4 Hours PRN 11/24/2019 12/4/2019    Sig - Route: Take 2 tablets by mouth Every 4 (Four) Hours As Needed for Severe Pain . - Oral    senna-docusate sodium (SENOKOT-S) 8.6-50 MG tablet 2 tablet 2 tablet 2 Times Daily 11/24/2019     Sig - Route: Take 2 tablets by mouth 2 (Two) Times a Day. - Oral    sodium chloride 0.9 % flush 10 mL 10 mL As Needed 11/24/2019     Sig - Route: Infuse 10 mL into a venous catheter As Needed for Line Care. - Intravenous    Linked Group 1:  \"And\" Linked Group Details        sodium chloride 0.9 % flush 10 mL 10 mL Every 12 Hours Scheduled 11/24/2019     Sig - Route: Infuse 10 mL into a venous catheter Every 12 (Twelve) Hours. - Intravenous    sodium chloride 0.9 % flush 10 mL 10 mL As Needed 11/24/2019     Sig - Route: Infuse 10 mL into a venous catheter As Needed for Line Care. - Intravenous          Blood Administration Record (From admission, onward)    None        Lab Results (last 48 hours)     Procedure Component Value Units Date/Time    [place] Draw [[id number]] Collected:  11/24/19 1334    Specimen:  Blood Updated:  11/24/19 1446    Narrative:       The following orders were created for panel order [place] Draw.  Procedure                               Abnormality         Status                     ---------                               -----------         ------                     Light Blue Top[[id number]]                                   Final result               Green Top (Gel)[[id number]]                        "           Final result               Lavender Top[[id number]]                                     Final result               Red Top[[id number]]                                          Final result                 Please view results for these tests on the individual orders.    Light Blue Top [[id number]] Collected:  11/24/19 1334    Specimen:  Blood Updated:  11/24/19 1446     Extra Tube hold for add-on     Comment: Auto resulted       Green Top (Gel) [[id number]] Collected:  11/24/19 1334    Specimen:  Blood Updated:  11/24/19 1446     Extra Tube Hold for add-ons.     Comment: Auto resulted.       Lavender Top [[id number]] Collected:  11/24/19 1334    Specimen:  Blood Updated:  11/24/19 1446     Extra Tube hold for add-on     Comment: Auto resulted       Red Top [[id number]] Collected:  11/24/19 1334    Specimen:  Blood Updated:  11/24/19 1446     Extra Tube Hold for add-ons.     Comment: Auto resulted.       Comprehensive Metabolic Panel [[id number]]  (Abnormal) Collected:  11/24/19 1334    Specimen:  Blood Updated:  11/24/19 1409     Glucose 145 mg/dL      BUN 10 mg/dL      Creatinine 0.79 mg/dL      Sodium 137 mmol/L      Potassium 4.4 mmol/L      Chloride 100 mmol/L      CO2 27.0 mmol/L      Calcium 9.7 mg/dL      Total Protein 6.3 g/dL      Albumin 4.10 g/dL      ALT (SGPT) 48 U/L      AST (SGOT) 35 U/L      Comment: Specimen hemolyzed.  Results may be affected.        Alkaline Phosphatase 56 U/L      Total Bilirubin 0.3 mg/dL      eGFR Non African Amer 103 mL/min/1.73      Globulin 2.2 gm/dL      A/G Ratio 1.9 g/dL      BUN/Creatinine Ratio 12.7     Anion Gap 10.0 mmol/L     Narrative:       GFR Normal >60  Chronic Kidney Disease <60  Kidney Failure <15    Protime-INR [[id number]]  (Normal) Collected:  11/24/19 1334    Specimen:  Blood Updated:  11/24/19 1350     Protime 13.6 Seconds      INR 1.01    aPTT [[id number]]  (Normal) Collected:  11/24/19 1334    Specimen:  Blood Updated:  11/24/19 1350     PTT 27.7  seconds     CBC & Differential [[id number]] Collected:  11/24/19 1334    Specimen:  Blood Updated:  11/24/19 1342    Narrative:       The following orders were created for panel order CBC & Differential.  Procedure                               Abnormality         Status                     ---------                               -----------         ------                     CBC Auto Differential[[id number]]        Abnormal            Final result                 Please view results for these tests on the individual orders.    CBC Auto Differential [[id number]]  (Abnormal) Collected:  11/24/19 1334    Specimen:  Blood Updated:  11/24/19 1342     WBC 12.40 10*3/mm3      RBC 5.45 10*6/mm3      Hemoglobin 14.9 g/dL      Hematocrit 44.4 %      MCV 81.5 fL      MCH 27.3 pg      MCHC 33.6 g/dL      RDW 13.8 %      RDW-SD 40.1 fl      MPV 10.6 fL      Platelets 229 10*3/mm3      Neutrophil % 66.8 %      Lymphocyte % 22.0 %      Monocyte % 8.1 %      Eosinophil % 1.8 %      Basophil % 0.6 %      Immature Grans % 0.7 %      Neutrophils, Absolute 8.27 10*3/mm3      Lymphocytes, Absolute 2.73 10*3/mm3      Monocytes, Absolute 1.01 10*3/mm3      Eosinophils, Absolute 0.22 10*3/mm3      Basophils, Absolute 0.08 10*3/mm3      Immature Grans, Absolute 0.09 10*3/mm3      nRBC 0.0 /100 WBC           Imaging Results (Last 72 Hours)     Procedure Component Value Units Date/Time    CT Lumbar Spine Without Contrast [[id number]] Collected:  11/24/19 1437     Updated:  11/24/19 1443    Narrative:       CT LUMBAR SPINE WO CONTRAST- 11/24/2019 2:27 PM CST     HISTORY: acute fall. Possible fracture of L1 on other x-rays at another  hospital.     COMPARISON: None      DOSE LENGTH PRODUCT: 1259 mGy cm. Automated exposure control was also  utilized to decrease patient radiation dose.     TECHNIQUE: Helical tomographic images of the lumbar spine were obtained  without the use of contrast. Multiplanar reformatted images were  provided for review.       FINDINGS:   Alignment: Normal.     Bones: There is an acute fracture through the superior endplate of L1.  Minimal height loss is noted. The fracture extends into the LEFT  posterior aspect of the vertebral body but does not extend into the  pedicle. No other fractures are identified.     Disc spaces: Mild disc space narrowing is present at L1-L2 and L2-L3.  There are small disc bulges at L4-L5 and L5-S1.     Canal and neuroforamina: Moderate facet hypertrophy is present at L4-L5,  resulting in moderate bony narrowing of the neuroforamina. There is no  bony narrowing of the spinal canal.     Soft tissues: Unremarkable.       Impression:       1. Acute, comminuted compression fracture through the superior endplate  of L1 that extends into the posterior aspect of the vertebral body but  does not extend into the pedicles. Approximately 10% vertebral body  height loss.  2. Mild to moderate degenerative changes.        This report was finalized on 11/24/2019 14:40 by Dr. Les Young MD.        Orders (last 72 hrs)     Start     Ordered    11/25/19 0920  MRI Lumbar Spine Without Contrast  1 Time Imaging      11/25/19 0920    11/25/19 0920  PT Consult: Eval & Treat As Tolerated; Functional Mobility Below Baseline  Once     Comments:  In TLSO brace.    11/25/19 0920    11/25/19 0800  metFORMIN (GLUCOPHAGE) tablet 500 mg  Daily With Breakfast      11/24/19 1719    11/25/19 0000  XR Spine Lumbar 2 or 3 View     Comments:  2 VIEW LATERAL ONLY.  UPRIGHT AND SUPINE IN TLSO BRACE    THANK YOU    11/25/19 0921    11/24/19 2100  sodium chloride 0.9 % flush 10 mL  Every 12 Hours Scheduled      11/24/19 1730    11/24/19 2100  senna-docusate sodium (SENOKOT-S) 8.6-50 MG tablet 2 tablet  2 Times Daily      11/24/19 1730    11/24/19 2000  Vital Signs  Every 4 Hours      11/24/19 1730    11/24/19 1800  lidocaine (LIDODERM) 5 % 1 patch  Every 24 Hours      11/24/19 1731 11/24/19 1729  aluminum-magnesium hydroxide-simethicone  (MAALOX MAX) 400-400-40 MG/5ML suspension 15 mL  Every 6 Hours PRN      11/24/19 1730    11/24/19 1729  ondansetron (ZOFRAN) injection 4 mg  Every 6 Hours PRN      11/24/19 1730    11/24/19 1729  oxyCODONE-acetaminophen (PERCOCET) 7.5-325 MG per tablet 2 tablet  Every 4 Hours PRN      11/24/19 1730    11/24/19 1729  oxyCODONE-acetaminophen (PERCOCET) 7.5-325 MG per tablet 1 tablet  Every 4 Hours PRN      11/24/19 1730    11/24/19 1723  Inpatient Neurosurgery Consult  Once     Specialty:  Neurosurgery  Provider:  Stephen Weaver MD    11/24/19 1730    11/24/19 1722  Code Status and Medical Interventions:  Continuous      11/24/19 1730    11/24/19 1722  Intake & Output  Every Shift      11/24/19 1730    11/24/19 1722  Weigh Patient  Once      11/24/19 1730    11/24/19 1722  Oxygen Therapy- Nasal Cannula; Titrate for SPO2: 90% - 95%  Continuous,   Status:  Canceled      11/24/19 1730    11/24/19 1722  Insert Peripheral IV  Once      11/24/19 1730    11/24/19 1722  Saline Lock & Maintain IV Access  Continuous      11/24/19 1730    11/24/19 1722  Place Sequential Compression Device  Once      11/24/19 1730    11/24/19 1722  Maintain Sequential Compression Device  Continuous      11/24/19 1730    11/24/19 1722  Diet Regular; Consistent Carbohydrate  Diet Effective Now      11/24/19 1730    11/24/19 1721  sodium chloride 0.9 % flush 10 mL  As Needed      11/24/19 1730    11/24/19 1609  Initiate Observation Status  Once      11/24/19 1609    11/24/19 1605  HYDROmorphone (DILAUDID) injection solution 1 mg  Once      11/24/19 1603    11/24/19 1549  HYDROmorphone (DILAUDID) injection solution 1 mg  Once      11/24/19 1547    11/24/19 1511  Obtain & Apply The Following- Back/Torso; Back brace (hard)  Once     Comments:  Please apply a TLSO BRACE    11/24/19 1510    11/24/19 1418  HYDROmorphone (DILAUDID) injection solution 1 mg  Once      11/24/19 1416    11/24/19 1351  Comprehensive Metabolic Panel  Once      11/24/19  1315    11/24/19 1346  Opioid Administration - Continuous Pulse Oximetry (SpO2) Monitoring  Continuous      11/24/19 1345    11/24/19 1346  Opioid Administration - Document SpO2 Value With Each Set of Vitals & Any Change in Patient Status  Continuous      11/24/19 1345    11/24/19 1346  Opioid Administration - Notify Provider Pulse Oximetry (SpO2)  Until Discontinued      11/24/19 1345    11/24/19 1321  [place] Draw  STAT      11/24/19 1320    11/24/19 1321  Light Blue Top  PROCEDURE ONCE      11/24/19 1320    11/24/19 1321  Green Top (Gel)  PROCEDURE ONCE      11/24/19 1320    11/24/19 1321  Lavender Top  PROCEDURE ONCE      11/24/19 1320    11/24/19 1321  Red Top  PROCEDURE ONCE      11/24/19 1320    11/24/19 1317  morphine injection 4 mg  Once      11/24/19 1315    11/24/19 1317  ondansetron (ZOFRAN) injection 4 mg  Once      11/24/19 1315    11/24/19 1315  CBC & Differential  Once      11/24/19 1315    11/24/19 1315  Protime-INR  Once      11/24/19 1315    11/24/19 1315  aPTT  Once      11/24/19 1315    11/24/19 1315  Insert peripheral IV  Once      11/24/19 1315    11/24/19 1315  CT Lumbar Spine Without Contrast  1 Time Imaging      11/24/19 1315    11/24/19 1315  CBC Auto Differential  PROCEDURE ONCE      11/24/19 1315    11/24/19 1314  sodium chloride 0.9 % flush 10 mL  As Needed      11/24/19 1315    Unscheduled  Up With Assistance  As Needed      11/24/19 1730    Unscheduled  Oxygen Therapy- Nasal Cannula; Titrate for SPO2: 90% - 95%  Continuous PRN      11/25/19 0735    --  diclofenac (VOLTAREN) 1 % gel gel  4 Times Daily PRN      11/24/19 1302          Ventilator/Non-Invasive Ventilation Settings (From admission, onward)    None        Operative/Procedure Notes (all)     No notes of this type exist for this encounter.           Physician Progress Notes (all)      Noel Villegas APRN at 11/25/19 0913     Attestation signed by Stephen Weaver MD at 11/25/19 8450    I have reviewed the documentation  above and agree. I have examined the patient and developed the treatment plan in conjunction with JACK Serna                      NEUROSURGERY DAILY PROGRESS NOTE    ASSESSMENT:   Gordy Erwin is a 54 y.o. with a significant comorbidity of obesity and diabetes.  He presents with a new problem of acute onset of back pain after fall without radiation. Physical exam findings of neurologically intact with tenderness to palpation over thoracal lumbar junction.  Their imaging shows 10% compression fracture of L1 vertebral body.    DDX:     Intractable back pain    Compression fracture of L1 lumbar vertebra (CMS/HCC)    Diabetes mellitus (CMS/HCC)    Morbid obesity (CMS/HCC)    Patient Active Problem List   Diagnosis   • Compression fracture of L1 lumbar vertebra (CMS/HCC)   • Diabetes mellitus (CMS/HCC)   • Morbid obesity (CMS/HCC)   • Intractable back pain     HPI: Back pain improving.  TLSO brace at bedside.  No acute events overnight.    PLAN:   Neuro: Stable.  Neuro unchanged.   L1 compression fracture   MRI of the lumbar spine today   TLSO brace when out of bed.  CV: No acute issues.  Pulm: No acute issues.  Maintaining O2 sat.  : No acute issues.  Voiding spontaneously.  FEN: No acute issues.  Tolerating regular diet.  GI: No acute issues.  ID: No acute issues.  Heme:  DVT prophylaxis; SCDs  Pain: No acute issues.  Dispo: PT/OT.   Tentative plan: If MRI stable, ready to discharge from neurosurgical perspective.   Max analgesics for pain.   Follow-up in the neurosurgical clinic on an outpatient basis in 3 weeks.   X-rays of the lumbar spine upright and supine in TLSO brace prior to appointment.   We will continue to follow as needed.     CHIEF COMPLAINT:   Back pain post fall    Subjective  Symptoms stable.  Doing well    Temp:  [97.9 °F (36.6 °C)-98.7 °F (37.1 °C)] 97.9 °F (36.6 °C)  Heart Rate:  [54-70] 60  Resp:  [13-16] 16  BP: (120-171)/(56-83) 126/69    Objective:  General Appearance:  Comfortable,  "well-appearing and in no acute distress.    Vital signs: (most recent): Blood pressure 126/69, pulse 60, temperature 97.9 °F (36.6 °C), temperature source Oral, resp. rate 16, height 200.7 cm (79\"), weight (!) 177 kg (391 lb), SpO2 92 %.        Neurologic Exam     Mental Status   Oriented to person, place, and time.   Attention: normal. Concentration: normal.   Speech: speech is normal   Level of consciousness: alert    Alert.  Bright and awake.  Follows commands.     Cranial Nerves     CN II   Visual fields full to confrontation.     CN III, IV, VI   Pupils are equal, round, and reactive to light.  Extraocular motions are normal.     CN V   Facial sensation intact.     CN VII   Facial expression full, symmetric.     CN VIII   CN VIII normal.     CN IX, X   CN IX normal.     CN XI   CN XI normal.     Motor Exam   Muscle bulk: normal  Overall muscle tone: normal  Right arm tone: normal  Left arm tone: normal  Right arm pronator drift: absent  Left arm pronator drift: absent  Right leg tone: normal  Left leg tone: normal    Strength   Right deltoid: 5/5  Left deltoid: 5/5  Right biceps: 5/5  Left biceps: 5/5  Right triceps: 5/5  Left triceps: 5/5  Right wrist extension: 5/5  Left wrist extension: 5/5  Right iliopsoas: 5/5  Left iliopsoas: 5/5  Right quadriceps: 5/5  Left quadriceps: 5/5  Right anterior tibial: 5/5  Left anterior tibial: 5/5  Right posterior tibial: 5/5  Left posterior tibial: 5/5  Moves all extremities.     Sensory Exam   Light touch normal.     Gait, Coordination, and Reflexes     Tremor   Resting tremor: absent  Intention tremor: absent  Action tremor: absent    Drains:  NA    Imaging Results (Last 24 Hours)     Procedure Component Value Units Date/Time    CT Lumbar Spine Without Contrast [[id number]] Collected:  11/24/19 1437     Updated:  11/24/19 1443    Narrative:       CT LUMBAR SPINE WO CONTRAST- 11/24/2019 2:27 PM CST     HISTORY: acute fall. Possible fracture of L1 on other x-rays at " another  hospital.     COMPARISON: None      DOSE LENGTH PRODUCT: 1259 mGy cm. Automated exposure control was also  utilized to decrease patient radiation dose.     TECHNIQUE: Helical tomographic images of the lumbar spine were obtained  without the use of contrast. Multiplanar reformatted images were  provided for review.      FINDINGS:   Alignment: Normal.     Bones: There is an acute fracture through the superior endplate of L1.  Minimal height loss is noted. The fracture extends into the LEFT  posterior aspect of the vertebral body but does not extend into the  pedicle. No other fractures are identified.     Disc spaces: Mild disc space narrowing is present at L1-L2 and L2-L3.  There are small disc bulges at L4-L5 and L5-S1.     Canal and neuroforamina: Moderate facet hypertrophy is present at L4-L5,  resulting in moderate bony narrowing of the neuroforamina. There is no  bony narrowing of the spinal canal.     Soft tissues: Unremarkable.       Impression:       1. Acute, comminuted compression fracture through the superior endplate  of L1 that extends into the posterior aspect of the vertebral body but  does not extend into the pedicles. Approximately 10% vertebral body  height loss.  2. Mild to moderate degenerative changes.        This report was finalized on 11/24/2019 14:40 by Dr. Les Young MD.        Lab Results (last 24 hours)     Procedure Component Value Units Date/Time    [place] Draw [[id number]] Collected:  11/24/19 1334    Specimen:  Blood Updated:  11/24/19 1446    Narrative:       The following orders were created for panel order [place] Draw.  Procedure                               Abnormality         Status                     ---------                               -----------         ------                     Light Blue Top[[id number]]                                   Final result               Green Top (Gel)[[id number]]                                  Final result                Lavender Top[[id number]]                                     Final result               Red Top[[id number]]                                          Final result                 Please view results for these tests on the individual orders.    Light Blue Top [[id number]] Collected:  11/24/19 1334    Specimen:  Blood Updated:  11/24/19 1446     Extra Tube hold for add-on     Comment: Auto resulted       Green Top (Gel) [[id number]] Collected:  11/24/19 1334    Specimen:  Blood Updated:  11/24/19 1446     Extra Tube Hold for add-ons.     Comment: Auto resulted.       Lavender Top [[id number]] Collected:  11/24/19 1334    Specimen:  Blood Updated:  11/24/19 1446     Extra Tube hold for add-on     Comment: Auto resulted       Red Top [[id number]] Collected:  11/24/19 1334    Specimen:  Blood Updated:  11/24/19 1446     Extra Tube Hold for add-ons.     Comment: Auto resulted.       Comprehensive Metabolic Panel [[id number]]  (Abnormal) Collected:  11/24/19 1334    Specimen:  Blood Updated:  11/24/19 1409     Glucose 145 mg/dL      BUN 10 mg/dL      Creatinine 0.79 mg/dL      Sodium 137 mmol/L      Potassium 4.4 mmol/L      Chloride 100 mmol/L      CO2 27.0 mmol/L      Calcium 9.7 mg/dL      Total Protein 6.3 g/dL      Albumin 4.10 g/dL      ALT (SGPT) 48 U/L      AST (SGOT) 35 U/L      Comment: Specimen hemolyzed.  Results may be affected.        Alkaline Phosphatase 56 U/L      Total Bilirubin 0.3 mg/dL      eGFR Non African Amer 103 mL/min/1.73      Globulin 2.2 gm/dL      A/G Ratio 1.9 g/dL      BUN/Creatinine Ratio 12.7     Anion Gap 10.0 mmol/L     Narrative:       GFR Normal >60  Chronic Kidney Disease <60  Kidney Failure <15    Protime-INR [[id number]]  (Normal) Collected:  11/24/19 1334    Specimen:  Blood Updated:  11/24/19 1350     Protime 13.6 Seconds      INR 1.01    aPTT [[id number]]  (Normal) Collected:  11/24/19 1334    Specimen:  Blood Updated:  11/24/19 1350     PTT 27.7 seconds     CBC & Differential  [[id number]] Collected:  11/24/19 1334    Specimen:  Blood Updated:  11/24/19 1342    Narrative:       The following orders were created for panel order CBC & Differential.  Procedure                               Abnormality         Status                     ---------                               -----------         ------                     CBC Auto Differential[[id number]]        Abnormal            Final result                 Please view results for these tests on the individual orders.    CBC Auto Differential [[id number]]  (Abnormal) Collected:  11/24/19 1334    Specimen:  Blood Updated:  11/24/19 1342     WBC 12.40 10*3/mm3      RBC 5.45 10*6/mm3      Hemoglobin 14.9 g/dL      Hematocrit 44.4 %      MCV 81.5 fL      MCH 27.3 pg      MCHC 33.6 g/dL      RDW 13.8 %      RDW-SD 40.1 fl      MPV 10.6 fL      Platelets 229 10*3/mm3      Neutrophil % 66.8 %      Lymphocyte % 22.0 %      Monocyte % 8.1 %      Eosinophil % 1.8 %      Basophil % 0.6 %      Immature Grans % 0.7 %      Neutrophils, Absolute 8.27 10*3/mm3      Lymphocytes, Absolute 2.73 10*3/mm3      Monocytes, Absolute 1.01 10*3/mm3      Eosinophils, Absolute 0.22 10*3/mm3      Basophils, Absolute 0.08 10*3/mm3      Immature Grans, Absolute 0.09 10*3/mm3      nRBC 0.0 /100 WBC         [id number]  MIKE Darby        Electronically signed by Stephen Weaver MD at 11/25/19 0907     Stephen Weaver MD at 11/24/19 1630          NEUROSURGERY INITIAL HOSPITAL ENCOUNTER    Assessment/Plan:   Gordy Erwin is a 54 y.o. male with a significant comorbidity of obesity and diabetes.  He presents with a new problem of acute onset of back pain after fall without radiation. Physical exam findings of neurologically intact with tenderness to palpation over thoracal lumbar junction.  Their imaging shows 10% compression fracture of L1 vertebral body.    Differential Diagnosis:   Acute L1 compression fracture    Recommendations:  I discussed the  radiographic imaging and prognosis with Gordy and his wife.  He has an acute traumatic L1 compression fracture of approximately 10% height loss.  There is no involvement of the posterior elements.  Given that he is not elderly and does not have osteoporosis, nor any risk factors for osteoporosis, he is not a candidate for kyphoplasty.  He is also a non-smoker.  Therefore his fracture should heal on his brace.  This should take approximately 3 months.  Would recommend aggressive pain management.  The patient is allergic to Toradol.  Therefore would recommend Tylenol, narcotics, lidocaine patches, gabapentin, and Valium as needed.  Typically these fractures will require 2 to 3 days of bedrest followed by slow progressive ambulation.  2 weeks off work as appropriate.  His fracture is intrinsically stable.  Would recommend TLSO brace for comfort but is not needed for stability.  Please notify neurosurgery should the patient develop new weakness, bowel or bladder dysfunction, or difficulty with gait.    I discussed the patients findings and my recommendations with patient, family and consulting provider    Thank you very much for this interesting consult.     Level of Risk: High due to:  illness with threat to life or bodily function  MDM: Moderate Complexity  Mod = 00481, High=99223  ___________________________________________________________________    Reason for consult  Fall from standing height    Chief Complaint:   Chief Complaint   Patient presents with   • Back Pain       HPI: Gordy Eriwn is a 54 y.o. male with a significant comorbidity diabetes, morbid obesity.  Of note his wife recently had a two-level fusion by Dr. Kaplan.  The patient was in her normal state of health until earlier today when he slipped going down a wheelchair ramp at his house.  His feet came out from underneath him and he landed on his buttocks.  He had acute onset of back pain.  He went to an outside hospital where x-rays were  performed and read as negative.  They were not pleased with the care there and were called back for CT scan and therefore sought care at [facility] emergency room.    On admission here he was complaining of 10 out of 10 pain.  This was localized to his back.  This worsened with twisting or sitting up.  There is no radiation into his legs.  He has no numbness or weakness.  He did have difficulty getting in a car with the assistance of the spells.  There is no bowel or bladder incontinence.    Noncontrast CT scan revealed a 10% compression fracture of his lumbar spine.    Review of Systems   Constitutional: Negative.    HENT: Negative.    Eyes: Negative.    Respiratory: Negative.    Cardiovascular: Negative.    Gastrointestinal: Negative.    Endocrine: Negative.    Genitourinary: Negative.    Musculoskeletal: Positive for back pain and gait problem.   Skin: Negative.    Allergic/Immunologic: Negative.    Hematological: Negative.    Psychiatric/Behavioral: Negative.         Past Medical History:  has a past medical history of Diabetes mellitus (CMS/[facility]).    Past Surgical History:  has a past surgical history that includes Foot surgery (Right).    Family History: family history is not on file.    Social History:  reports that he quit smoking about 7 years ago. He has never used smokeless tobacco. He reports that he does not drink alcohol or use drugs.    Allergies: Penicillins; Contrast dye; Sulfa antibiotics; and Toradol [ketorolac tromethamine]    Home Medications:   Current Facility-Administered Medications:   •  [COMPLETED] Insert peripheral IV, , , Once **AND** sodium chloride 0.9 % flush 10 mL, 10 mL, Intravenous, PRN, Benoit Ibrahim, DO    Current Outpatient Medications:   •  diclofenac (VOLTAREN) 1 % gel gel, Apply 4 g topically to the appropriate area as directed 4 (Four) Times a Day As Needed., Disp: , Rfl:   •  meloxicam (MOBIC) 15 MG tablet, Take 15 mg by mouth., Disp: , Rfl:   •  diclofenac (VOLTAREN)  75 MG EC tablet, Take 1 tablet by mouth 2 (Two) Times a Day., Disp: 14 tablet, Rfl: 0  •  metFORMIN (GLUCOPHAGE) 500 MG tablet, Take 500 mg by mouth Daily With Breakfast., Disp: , Rfl:   •  sildenafil (VIAGRA) 100 MG tablet, Take 100 mg by mouth., Disp: , Rfl:     Medications: Scheduled Meds:   Continuous Infusions:   PRN Meds:.•  [COMPLETED] Insert peripheral IV **AND** sodium chloride    Vital Signs  Temp:  [98.3 °F (36.8 °C)-98.7 °F (37.1 °C)] 98.7 °F (37.1 °C)  Heart Rate:  [54-62] 61  Resp:  [13-15] 13  BP: (137-154)/(64-83) 143/69    Physical Exam  Physical Exam   Constitutional: He is oriented to person, place, and time. He appears well-developed and well-nourished.   HENT:   Head: Normocephalic and atraumatic.   Eyes: Conjunctivae and EOM are normal. Pupils are equal, round, and reactive to light.   Fundoscopic exam:       The right eye shows no exudate, no hemorrhage and no papilledema.        The left eye shows no exudate, no hemorrhage and no papilledema.   Neck: Normal range of motion. Neck supple.   Cardiovascular:   Pulses:       Dorsalis pedis pulses are 2+ on the right side, and 2+ on the left side.        Posterior tibial pulses are 2+ on the right side, and 2+ on the left side.   Pulmonary/Chest: Effort normal. No respiratory distress.     Vascular Status -  His right foot exhibits no edema. His left foot exhibits no edema.  Neurological: He is oriented to person, place, and time. He has a normal Finger-Nose-Finger Test, a normal Heel to Benjamin Test and a normal Tandem Gait Test. Gait normal.   Skin: Skin is warm. No rash noted. No erythema.   Psychiatric: His speech is normal.       Neurologic Exam     Mental Status   Oriented to person, place, and time.   Registration: recalls 3 of 3 objects. Recall at 5 minutes: recalls 3 of 3 objects.   Attention: normal. Concentration: normal.   Speech: speech is normal   Knowledge: consistent with education.     Cranial Nerves     CN II   Visual acuity:  normal    CN III, IV, VI   Pupils are equal, round, and reactive to light.  Extraocular motions are normal.   Diplopia: none    CN V   Facial sensation intact.   Right corneal reflex: normal  Left corneal reflex: normal    CN VII   Right facial weakness: none  Left facial weakness: none    CN VIII   Hearing: intact    CN IX, X   Palate: symmetric  Right gag reflex: normal  Left gag reflex: normal    CN XI   Right trapezius strength: normal  Left trapezius strength: normal    CN XII   Tongue deviation: none    Motor Exam   Right arm tone: normal  Left arm tone: normal  Right arm pronator drift: absent  Left arm pronator drift: absent  Right leg tone: normal  Left leg tone: normal    Strength   Strength 5/5 except as noted.     Sensory Exam   Light touch normal.   Proprioception normal.     Gait, Coordination, and Reflexes     Gait  Gait: normal    Coordination   Finger to nose coordination: normal  Heel to shin coordination: normal  Tandem walking coordination: normal    Reflexes   Reflexes 2+ except as noted.   Right plantar: normal  Left plantar: normal  Right Reyes: absent  Left Reyes: absent    Tenderness to palpation over thoracolumbar junction.    Results Review:   Independent review and interpretation of imaging  Imaging Results (Last 24 Hours)     Procedure Component Value Units Date/Time    CT Lumbar Spine Without Contrast [[id number]] Collected:  11/24/19 1437     Updated:  11/24/19 1443    Narrative:       CT LUMBAR SPINE WO CONTRAST- 11/24/2019 2:27 PM CST     HISTORY: acute fall. Possible fracture of L1 on other x-rays at another  hospital.     COMPARISON: None      DOSE LENGTH PRODUCT: 1259 mGy cm. Automated exposure control was also  utilized to decrease patient radiation dose.     TECHNIQUE: Helical tomographic images of the lumbar spine were obtained  without the use of contrast. Multiplanar reformatted images were  provided for review.      FINDINGS:   Alignment: Normal.     Bones: There is an  acute fracture through the superior endplate of L1.  Minimal height loss is noted. The fracture extends into the LEFT  posterior aspect of the vertebral body but does not extend into the  pedicle. No other fractures are identified.     Disc spaces: Mild disc space narrowing is present at L1-L2 and L2-L3.  There are small disc bulges at L4-L5 and L5-S1.     Canal and neuroforamina: Moderate facet hypertrophy is present at L4-L5,  resulting in moderate bony narrowing of the neuroforamina. There is no  bony narrowing of the spinal canal.     Soft tissues: Unremarkable.       Impression:       1. Acute, comminuted compression fracture through the superior endplate  of L1 that extends into the posterior aspect of the vertebral body but  does not extend into the pedicles. Approximately 10% vertebral body  height loss.  2. Mild to moderate degenerative changes.        This report was finalized on 11/24/2019 14:40 by Dr. Les Young MD.        MRI brain:  MRI spine:   CT Head:  CT c-spine:  CT t-spine:  CT l-spine:  X-ray:    I reviewed the patient's new clinical results.  Lab Results (last 24 hours)     Procedure Component Value Units Date/Time    [place] Draw [[id number]] Collected:  11/24/19 1334    Specimen:  Blood Updated:  11/24/19 1446    Narrative:       The following orders were created for panel order [place] Draw.  Procedure                               Abnormality         Status                     ---------                               -----------         ------                     Light Blue Top[[id number]]                                   Final result               Green Top (Gel)[[id number]]                                  Final result               Lavender Top[[id number]]                                     Final result               Red Top[[id number]]                                          Final result                 Please view results for these tests on the individual orders.    Light Blue Top  [[id number]] Collected:  11/24/19 1334    Specimen:  Blood Updated:  11/24/19 1446     Extra Tube hold for add-on     Comment: Auto resulted       Green Top (Gel) [[id number]] Collected:  11/24/19 1334    Specimen:  Blood Updated:  11/24/19 1446     Extra Tube Hold for add-ons.     Comment: Auto resulted.       Lavender Top [[id number]] Collected:  11/24/19 1334    Specimen:  Blood Updated:  11/24/19 1446     Extra Tube hold for add-on     Comment: Auto resulted       Red Top [[id number]] Collected:  11/24/19 1334    Specimen:  Blood Updated:  11/24/19 1446     Extra Tube Hold for add-ons.     Comment: Auto resulted.       Comprehensive Metabolic Panel [[id number]]  (Abnormal) Collected:  11/24/19 1334    Specimen:  Blood Updated:  11/24/19 1409     Glucose 145 mg/dL      BUN 10 mg/dL      Creatinine 0.79 mg/dL      Sodium 137 mmol/L      Potassium 4.4 mmol/L      Chloride 100 mmol/L      CO2 27.0 mmol/L      Calcium 9.7 mg/dL      Total Protein 6.3 g/dL      Albumin 4.10 g/dL      ALT (SGPT) 48 U/L      AST (SGOT) 35 U/L      Comment: Specimen hemolyzed.  Results may be affected.        Alkaline Phosphatase 56 U/L      Total Bilirubin 0.3 mg/dL      eGFR Non African Amer 103 mL/min/1.73      Globulin 2.2 gm/dL      A/G Ratio 1.9 g/dL      BUN/Creatinine Ratio 12.7     Anion Gap 10.0 mmol/L     Narrative:       GFR Normal >60  Chronic Kidney Disease <60  Kidney Failure <15    Protime-INR [[id number]]  (Normal) Collected:  11/24/19 1334    Specimen:  Blood Updated:  11/24/19 1350     Protime 13.6 Seconds      INR 1.01    aPTT [[id number]]  (Normal) Collected:  11/24/19 1334    Specimen:  Blood Updated:  11/24/19 1350     PTT 27.7 seconds     CBC & Differential [[id number]] Collected:  11/24/19 1334    Specimen:  Blood Updated:  11/24/19 1342    Narrative:       The following orders were created for panel order CBC & Differential.  Procedure                               Abnormality         Status                      ---------                               -----------         ------                     CBC Auto Differential[[id number]]        Abnormal            Final result                 Please view results for these tests on the individual orders.    CBC Auto Differential [[id number]]  (Abnormal) Collected:  11/24/19 1334    Specimen:  Blood Updated:  11/24/19 1342     WBC 12.40 10*3/mm3      RBC 5.45 10*6/mm3      Hemoglobin 14.9 g/dL      Hematocrit 44.4 %      MCV 81.5 fL      MCH 27.3 pg      MCHC 33.6 g/dL      RDW 13.8 %      RDW-SD 40.1 fl      MPV 10.6 fL      Platelets 229 10*3/mm3      Neutrophil % 66.8 %      Lymphocyte % 22.0 %      Monocyte % 8.1 %      Eosinophil % 1.8 %      Basophil % 0.6 %      Immature Grans % 0.7 %      Neutrophils, Absolute 8.27 10*3/mm3      Lymphocytes, Absolute 2.73 10*3/mm3      Monocytes, Absolute 1.01 10*3/mm3      Eosinophils, Absolute 0.22 10*3/mm3      Basophils, Absolute 0.08 10*3/mm3      Immature Grans, Absolute 0.09 10*3/mm3      nRBC 0.0 /100 WBC           Stephen Weaver MD          Electronically signed by Stephen Weaver MD at 11/24/19 1642     Stephen Weaver MD at 11/24/19 1510        Contacted from the emergency room.  Patient is a morbidly obese 54-year-old male who sustained a fall at home.  He had acute onset of back pain without radiation, numbness, weakness.  He has pain that is worsening with standing and relieved with laying down.    His imaging is reviewed remotely which shows an L1 10% compression fracture without evidence of instability.    Recommend maximal analgesics  TLSO  Rest and patient may follow-up with me in clinic next week  Given that this is a traumatic fracture in a non-osteoporotic patient kyphoplasty is not indicated.    Stephen Weaver MD      Electronically signed by Stephen Weaver MD at 11/24/19 1511       Consult Notes (all)     No notes of this type exist for this encounter.      ED to Hosp-Admission    11/24/2019   Gordy Erwin   MRN: [id number]   All Administrations of HYDROmorphone (DILAUDID) injection solution 1 mg     Administration Action Time Recorded Time Documented By Site Comment Reason   Given : 1 mg :   : Intravenous 11/24/19 1419 11/24/19 1419 Mike Huddleston RN        ED to Hosp-Admission   11/24/2019   Gordy Erwin   MRN: [id number]   All Administrations of HYDROmorphone (DILAUDID) injection solution 1 mg     Administration Action Time Recorded Time Documented By Site Comment Reason   Given : 1 mg :   : Intravenous 11/24/19 1551 11/24/19 1551 Mike Huddleston RN        ED to Hosp-Admission   11/24/2019   Gordy Erwin   MRN: [id number]   All Administrations of HYDROmorphone (DILAUDID) injection solution 1 mg     Administration Action Time Recorded Time Documented By Site Comment Reason   Given : 1 mg :   : Intravenous 11/24/19 1625 11/24/19 1625 Wilfred Peralta Nursing Student  Dual Signoff By:  Eloina Knox RN        ED to Hosp-Admission   11/24/2019   Gordy Erwin   MRN: [id number]   All Administrations of morphine injection 4 mg     Administration Action Time Recorded Time Documented By Site Comment Reason   Given : 4 mg :   : Intravenous 11/24/19 1340 11/24/19 1340 Mike Huddleston RN

## 2019-11-25 NOTE — DISCHARGE INSTRUCTIONS
[facility] Neurosurgery    Dear Patient,  You recently were admitted to the hospital for (L1 compression fracture) and are now ready to go home. These written instructions are intended to help you to recover quickly.  • If you have ANY QUESTIONS about your condition prior to discharge please ask Dr. Weaver. In particular, if you have concerns about going home discuss them now. We do not want you to go until you are completely comfortable leaving the hospital.   • If you have ANY QUESTIONS about your condition after you go home call your doctor. The number is [phone] which is answered 24 hours a day. During regular working hours a  will connect you to your doctor, one of his partners, or one of our nurses. At night or on weekends the answering service will connect you with the physicianon call. DO NOT HESITATE to call. We want to help you with any problems.     Seizures  Anyone who has brain injury has a small risk of seizures. Seizures may involve involuntary shaking of the arms and legs, loss of consciousness, loss of urinary or bowel control. They typically last a few minutes, then the patient returns to normal. Seizures are frightening to watch, but usually resolve without long-term problems. Your doctor will often attempt to prevent seizures after surgery by putting you on anti-seizure medicine like Dilantin or Keppra. Sometimes seizures occur even when these medicines are used  What to do if a seizure occurs:  • If a seizure occurs and the patient does not return to normal in 10 minutes, call your Dr. Weaver or go to the local emergency room.   • If multiple seizures occur, call 911.     Neurological Deficit  Neurological deficits are problems with brain function like speech difficulty, weakness, numbness, imbalance, etc. These deficits may be present before or after brain injury. Prior to discharge Dr. Weaver will make sure that all treatment needed to help you recover from such  deficits has been instituted. He will also make sure that these deficits are stable or improving. After you go home, if you think any of your brain problems are getting worse, not better, it may be a sign of bleeding, infection, or other problems. Call Dr. Weaver. He will order tests and prescribe treatment as needed.    Deep vein thrombosis/ pulmonary embolus  Some patients who are admitted to the hospital develop blood clots in the veins of the legs. These are caused by decreased walking and laying in bed.  These clots can cause pain or swelling in the legs, or may cause no obvious problem. They can break free from the legs and travel to the lungs causing shortness of breath and/or chest pain. If you develop pain or swelling in your legs after surgery, call your doctor. If you develop breathing problems or chest pain after surgery, call 911.    Medication  It is important to take your medication EXACTLY as prescribed. Some patients are reluctant to take pain medication. It is perfectly fine to take pain medication for several weeks after injury. We want to eliminate pain whenever possible. Many pain medications can cause nausea (sick to your stomach), constipation (inability to poop), or itching. Nausea may be minimized by taking the medication with food. Constipation can be relieved by taking stool softeners and/ or laxatives that you can purchase over the counter as needed. Some medications, like steroids or seizure medications, should not be stopped abruptly. They need to be weaned off over time. For instructions on weaning schedules call your doctor. Sometimes patients develop an allergy to a medication that was started during hospitalization. Most frequently an allergy will cause an itchy rash. Call your doctor if you think you might be having an allergic reaction.    Hydrocephalus  Your brain manufactures about one quart of clear fluid (called cerebrospinal fluid or CSF) every day. Normally this fluid is  reabsorbed into the blood stream. After brain injury the flow of fluid and the reabsorption process may be impaired. This leads to a buildup of water on the brain that is called hydrocephalus. Hydrocephalus can cause headache, somnolence, difficulty thinking, imbalance and loss of urinary control. If you think that the patient may have hydrocephalus, call your doctor. She/He will order a brain scan. If it shows water buildup a simple operation called a shunt may be needed to fix the problem.    How to contact your doctor  The neurosurgeon, Dr. Weaver, and her/his team did your surgery and, therefore, are likely to know more about your condition than any other physicians. We are immediately available to help you with any problems after surgery. Please call us for any concerns at the following numbers:   • Doctor’s office [phone] (answered 24 hours a day)   • [facility]  [phone] (alternative emergency number for on-call neurosurgeon)     Specific instructions:  TLSO brace on at all times while out of bed.  Avoid nonsteroidal anti-inflammatory drugs such as ibuprofen, Motrin, Aleve, Mobic, etc.  Diet: no restrictions, eat a heart healthy diet.   Activity: as tolerated, no heavy lifting or strenuous exercise for at least 2 weeks.  ?  Follow up:   You should call as soon as possible for follow up with Noel MCKEON/ Dr. Weaver in the neurosurgery clinic ([phone]) in 3 weeks.

## 2019-11-25 NOTE — PAYOR COMM NOTE
"Gordy Bess (54 y.o. Male)     Date of Birth Social Security Number Address Home Phone MRN    1965  [address] [place] [address] [phone] [id number]    Advent Marital Status          Gnosticist        Admission Date Admission Type Admitting Provider Attending Provider Department, Room/Bed    11/24/19 Emergency Robert Frazier DO Robinson, Maurice S, DO [facility] 3A, 345/1    Discharge Date Discharge Disposition Discharge Destination                       Attending Provider:  Robert Frazier DO    Allergies:  Penicillins, Contrast Dye, Sulfa Antibiotics, Toradol [Ketorolac Tromethamine]    Isolation:  None   Infection:  None   Code Status:  CPR    Ht:  200.7 cm (79\")   Wt:  177 kg (391 lb)    Admission Cmt:  None   Principal Problem:  Intractable back pain [M54.9]                 Active Insurance as of 11/24/2019     Primary Coverage     Payor Plan Insurance Group Employer/Plan Group    [facility] [employer] [facility] [employer] EMPLOYEE [id number]     Payor Plan Address Payor Plan Phone Number Payor Plan Fax Number Effective Dates    [address] [phone]  1/1/2015 - None Entered    [place] [address]       Subscriber Name Subscriber Birth Date Member ID       GORDY BESS 1965 [id number]                 Emergency Contacts      (Rel.) Home Phone Work Phone Mobile Phone    Nanda Bess (Spouse) [phone] -- [phone]               History & Physical      Robert Frazier DO at 11/24/19 [address] [place] [address] Medicine Services  HISTORY AND PHYSICAL    Date of Admission: 11/24/2019  Primary Care Physician: Hemal Gutierrez MD    Subjective     Chief Complaint:   Low back discomfort    History of Present Illness    This 54-year-old white male was admitted with a chief complaint of severe low back discomfort after a fall he incurred at home this morning at about 5:30.  The " patient awakened this morning early in order to let his dog out and slipped on an icy wooden ramp at his home at about 5:30 this morning landing in a sitting position on his buttocks.  He encountered immediate and severe, debilitating back discomfort and subsequently went to the [place] emergency department where x-rays were taken and he was treated and discharged with oral pain medication.  The [place] emergency department called him back on the same day recommending he return for a CT scan for a possible fracture that was noted by the radiologist on plain film x-rays.  The patient came to our facility instead, for further evaluation.  The patient feels much better when he is lying flat on his back but has very significant and very limiting pain when sitting up or standing.  His pain has not been controlled without IV pain medication to this point.  Neurosurgery was consulted in the emergency department and evaluated the patient's CT films and saw the patient in the emergency department.  Recommendations were for maximal analgesics, TLSO, rest and follow-up with Dr. Weaver in his clinic next week.  Kyphoplasty was not indicated given the traumatic nature of the fracture in a non-osteoporotic patient.    CMP was obtained in the ER and was unremarkable.  White blood cell count is reactively elevated at 12,400.  INR and PTT within normal limits.    Review of Systems   Constitutional: Positive for activity change.   HENT: Negative.    Eyes: Negative.    Respiratory: Negative.    Cardiovascular: Negative.    Gastrointestinal: Negative.    Endocrine: Negative.    Genitourinary: Negative.    Musculoskeletal: Positive for back pain and gait problem.   Skin: Negative.    Allergic/Immunologic: Negative.    Hematological: Negative.    Psychiatric/Behavioral: Negative.         Otherwise complete ROS reviewed and negative except as mentioned in the HPI.      Past Medical History:     Past Medical History:   Diagnosis  "Date   • Diabetes mellitus (CMS/HCC)        Past Surgical History:  Past Surgical History:   Procedure Laterality Date   • FOOT SURGERY Right    • GANGLION CYST EXCISION         Family History:   family history includes Bipolar disorder in his father; Heart disease in his mother; No Known Problems in his brother; Osteoporosis in his mother; Suicidality in his father.    Social History:    reports that he quit smoking about 7 years ago. He has never used smokeless tobacco. He reports that he does not drink alcohol or use drugs.    Medications:  Prior to Admission medications    Medication Sig Start Date End Date Taking? Authorizing Provider   diclofenac (VOLTAREN) 1 % gel gel Apply 4 g topically to the appropriate area as directed 4 (Four) Times a Day As Needed.   Yes Jerica Celis MD   meloxicam (MOBIC) 15 MG tablet Take 15 mg by mouth. 4/5/19  Yes Jerica Celis MD   diclofenac (VOLTAREN) 75 MG EC tablet Take 1 tablet by mouth 2 (Two) Times a Day. 10/2/17   Luis Antonio Mccall PA-C   metFORMIN (GLUCOPHAGE) 500 MG tablet Take 500 mg by mouth Daily With Breakfast. 6/25/19   Jerica Celis MD   sildenafil (VIAGRA) 100 MG tablet Take 100 mg by mouth. 7/15/19   Jerica Celis MD       Allergies:  Allergies   Allergen Reactions   • Penicillins Shortness Of Breath   • Contrast Dye Nausea Only   • Sulfa Antibiotics Hives   • Toradol [Ketorolac Tromethamine] Hives and Itching       Objective     Vital Signs:   /69   Pulse 61   Temp 98.7 °F (37.1 °C) (Oral)   Resp 13   Ht 200.7 cm (79\")   Wt (!) 177 kg (391 lb)   SpO2 95%   BMI 44.05 kg/m²      Physical Exam   Constitutional: He is oriented to person, place, and time. He appears well-developed and well-nourished. He is cooperative. No distress.   HENT:   Head: Normocephalic and atraumatic.   Right Ear: External ear normal.   Left Ear: External ear normal.   Mouth/Throat: Oropharynx is clear and moist.   Eyes: Conjunctivae and EOM " are normal. Pupils are equal, round, and reactive to light. No scleral icterus.   Neck: Normal range of motion. Neck supple. No JVD present.   Cardiovascular: Normal rate, regular rhythm, normal heart sounds and intact distal pulses.   No murmur heard.  Pulmonary/Chest: Effort normal and breath sounds normal. No respiratory distress.   Abdominal: Soft. Bowel sounds are normal. He exhibits no mass. There is no tenderness.   Musculoskeletal: He exhibits tenderness ( Upper lumbar region). He exhibits no edema or deformity.        Lumbar back: He exhibits decreased range of motion, tenderness, bony tenderness, pain and spasm.   Neurological: He is alert and oriented to person, place, and time. He displays normal reflexes. No cranial nerve deficit. Coordination normal.   Skin: Skin is warm and dry. No erythema.   Psychiatric: He has a normal mood and affect. His behavior is normal.         Results Reviewed:  Lab Results (last 24 hours)     Procedure Component Value Units Date/Time    [place] Draw [[id number]] Collected:  11/24/19 1334    Specimen:  Blood Updated:  11/24/19 1446    Narrative:       The following orders were created for panel order [place] Draw.  Procedure                               Abnormality         Status                     ---------                               -----------         ------                     Light Blue Top[[id number]]                                   Final result               Green Top (Gel)[[id number]]                                  Final result               Lavender Top[[id number]]                                     Final result               Red Top[[id number]]                                          Final result                 Please view results for these tests on the individual orders.    Light Blue Top [[id number]] Collected:  11/24/19 1334    Specimen:  Blood Updated:  11/24/19 1446     Extra Tube hold for add-on     Comment: Auto resulted       Green Top (Gel)  [[id number]] Collected:  11/24/19 1334    Specimen:  Blood Updated:  11/24/19 1446     Extra Tube Hold for add-ons.     Comment: Auto resulted.       Lavender Top [[id number]] Collected:  11/24/19 1334    Specimen:  Blood Updated:  11/24/19 1446     Extra Tube hold for add-on     Comment: Auto resulted       Red Top [[id number]] Collected:  11/24/19 1334    Specimen:  Blood Updated:  11/24/19 1446     Extra Tube Hold for add-ons.     Comment: Auto resulted.       Comprehensive Metabolic Panel [[id number]]  (Abnormal) Collected:  11/24/19 1334    Specimen:  Blood Updated:  11/24/19 1409     Glucose 145 mg/dL      BUN 10 mg/dL      Creatinine 0.79 mg/dL      Sodium 137 mmol/L      Potassium 4.4 mmol/L      Chloride 100 mmol/L      CO2 27.0 mmol/L      Calcium 9.7 mg/dL      Total Protein 6.3 g/dL      Albumin 4.10 g/dL      ALT (SGPT) 48 U/L      AST (SGOT) 35 U/L      Comment: Specimen hemolyzed.  Results may be affected.        Alkaline Phosphatase 56 U/L      Total Bilirubin 0.3 mg/dL      eGFR Non African Amer 103 mL/min/1.73      Globulin 2.2 gm/dL      A/G Ratio 1.9 g/dL      BUN/Creatinine Ratio 12.7     Anion Gap 10.0 mmol/L     Narrative:       GFR Normal >60  Chronic Kidney Disease <60  Kidney Failure <15    Protime-INR [[id number]]  (Normal) Collected:  11/24/19 1334    Specimen:  Blood Updated:  11/24/19 1350     Protime 13.6 Seconds      INR 1.01    aPTT [[id number]]  (Normal) Collected:  11/24/19 1334    Specimen:  Blood Updated:  11/24/19 1350     PTT 27.7 seconds     CBC & Differential [[id number]] Collected:  11/24/19 1334    Specimen:  Blood Updated:  11/24/19 1342    Narrative:       The following orders were created for panel order CBC & Differential.  Procedure                               Abnormality         Status                     ---------                               -----------         ------                     CBC Auto Differential[[id number]]        Abnormal            Final result                  Please view results for these tests on the individual orders.    CBC Auto Differential [[id number]]  (Abnormal) Collected:  11/24/19 1334    Specimen:  Blood Updated:  11/24/19 1342     WBC 12.40 10*3/mm3      RBC 5.45 10*6/mm3      Hemoglobin 14.9 g/dL      Hematocrit 44.4 %      MCV 81.5 fL      MCH 27.3 pg      MCHC 33.6 g/dL      RDW 13.8 %      RDW-SD 40.1 fl      MPV 10.6 fL      Platelets 229 10*3/mm3      Neutrophil % 66.8 %      Lymphocyte % 22.0 %      Monocyte % 8.1 %      Eosinophil % 1.8 %      Basophil % 0.6 %      Immature Grans % 0.7 %      Neutrophils, Absolute 8.27 10*3/mm3      Lymphocytes, Absolute 2.73 10*3/mm3      Monocytes, Absolute 1.01 10*3/mm3      Eosinophils, Absolute 0.22 10*3/mm3      Basophils, Absolute 0.08 10*3/mm3      Immature Grans, Absolute 0.09 10*3/mm3      nRBC 0.0 /100 WBC           Ct Lumbar Spine Without Contrast    Result Date: 11/24/2019  Narrative: CT LUMBAR SPINE WO CONTRAST- 11/24/2019 2:27 PM CST  HISTORY: acute fall. Possible fracture of L1 on other x-rays at another hospital.  COMPARISON: None  DOSE LENGTH PRODUCT: 1259 mGy cm. Automated exposure control was also utilized to decrease patient radiation dose.  TECHNIQUE: Helical tomographic images of the lumbar spine were obtained without the use of contrast. Multiplanar reformatted images were provided for review.  FINDINGS: Alignment: Normal.  Bones: There is an acute fracture through the superior endplate of L1. Minimal height loss is noted. The fracture extends into the LEFT posterior aspect of the vertebral body but does not extend into the pedicle. No other fractures are identified.  Disc spaces: Mild disc space narrowing is present at L1-L2 and L2-L3. There are small disc bulges at L4-L5 and L5-S1.  Canal and neuroforamina: Moderate facet hypertrophy is present at L4-L5, resulting in moderate bony narrowing of the neuroforamina. There is no bony narrowing of the spinal canal.  Soft tissues: Unremarkable.        Impression: 1. Acute, comminuted compression fracture through the superior endplate of L1 that extends into the posterior aspect of the vertebral body but does not extend into the pedicles. Approximately 10% vertebral body height loss. 2. Mild to moderate degenerative changes.   This report was finalized on 11/24/2019 14:40 by Dr. Les Young MD.     I have personally reviewed and interpreted the radiology studies and ECG obtained at time of admission.     Assessment / Plan      Assessment & Plan  Active Hospital Problems    Diagnosis   • **Intractable back pain   • Compression fracture of L1 lumbar vertebra (CMS/HCC)   • Diabetes mellitus (CMS/HCC)   • Morbid obesity (CMS/HCC)       PLAN:   Admit to observation  Lidoderm topical patch to lumbar region  Oral pain medications  Ambulate with assistance  Probable discharge tomorrow when pain control is achieved  Neurosurgery to follow-up while inpatient    Code Status:   Full code  Surrogate decision makers the patient's wife     I discussed the patient's findings and my recommendations with: The patient and his wife    Estimated length of stay: 1 day    Robert Frazier DO   11/24/19   4:55 PM                Electronically signed by Robert Frazier DO at 11/24/19 1712          Emergency Department Notes      Benoit Ibrahim DO at 11/24/19 1321          Subjective   This 54-year-old male patient went outside this morning and slipped on icy wooden ramp landed in a sitting position.  About 5:45 in the morning he states he had immediate pain to his lower back area and felt very nauseated.  They went to [facility] and were seen and had x-rays done.  The emergency physician discharge them with Percocet and told him that he did not see any fracture.  About an hour after he got home they received a phone call from the [facility] stating that he needs to come back for CT scan.  They discovered that he had a possible fracture of  L1.  [place] [facility] [place] ambulance and they were brought here.  Patient continues to have severe pain in his lower back which is much worse if he tries to sit up at all or walk.  He has no pain radiating down the legs.  No bowel or bladder dysfunction.  When he lays flat the pain is not too bad.            Review of Systems   Constitutional: Positive for activity change.   HENT: Negative.    Eyes: Negative.    Respiratory: Negative.    Cardiovascular: Negative.    Gastrointestinal: Negative.    Genitourinary: Negative.    Musculoskeletal: Positive for back pain. Negative for neck pain.        He has acute severe lower back pain.  He has good motion of his hips knees and ankles and no radiation down the lower extremities.   Neurological:        Patient states when he fell he landed in the sitting position he did not hit his head and he has no headache or neck pain.   Psychiatric/Behavioral: Negative.        Past Medical History:   Diagnosis Date   • Diabetes mellitus (CMS/[facility])        Allergies   Allergen Reactions   • Penicillins Shortness Of Breath   • Contrast Dye Nausea Only   • Sulfa Antibiotics Hives   • Toradol [Ketorolac Tromethamine] Hives and Itching       Past Surgical History:   Procedure Laterality Date   • FOOT SURGERY Right        History reviewed. No pertinent family history.    Social History     Socioeconomic History   • Marital status:      Spouse name: Not on file   • Number of children: Not on file   • Years of education: Not on file   • Highest education level: Not on file   Tobacco Use   • Smoking status: Former Smoker     Last attempt to quit: 10/2/2012     Years since quittin.1   • Smokeless tobacco: Never Used   Substance and Sexual Activity   • Alcohol use: No     Comment: rarely   • Drug use: No           Objective   Physical Exam   Constitutional: He is oriented to person, place, and time. He appears well-developed and  well-nourished.   HENT:   Head: Normocephalic and atraumatic.   Eyes: EOM are normal. Pupils are equal, round, and reactive to light.   Neck: Normal range of motion. Neck supple.   Cardiovascular: Normal rate, regular rhythm and normal heart sounds.   Pulmonary/Chest: Effort normal and breath sounds normal.   Abdominal: Soft. Bowel sounds are normal.   Musculoskeletal:   He is tender in the upper lumbar region.   Neurological: He is alert and oriented to person, place, and time.   Skin: Skin is warm and dry.   Nursing note and vitals reviewed.      Procedures          ED Course                  MDM  Number of Diagnoses or Management Options  Diagnosis management comments: 1500 CT scan does indeed show a comminuted fracture of the body of L1 with no evidence of posterior retropulsion toward the spinal cord.  Dr. Weaver was called and recommended a brace and pain medication.  1530 Dr. Weaver was present in the department and the patient and they seem to be willing to try to go home if they can get the brace on.  1600 The patient has continued intractable pain despite having multiple doses of narcotic medication he still cannot get up to put on the brace which was recommended by Dr. Weaver the neurosurgeon.  The case was then discussed with Dr. Frazier who agrees to admit the patient for observation.        Final diagnoses:   Compression fracture of L1 vertebra, initial encounter (CMS/[facility])   Intractable pain              Benoit Ibrahim DO  11/24/19 1609      Electronically signed by Benoit Ibrahim DO at 11/24/19 1609     Mike Huddleston RN at 11/24/19 1415        Dr. Ibrahim notified pt still c/o pain after morphine     Mike Huddleston RN  11/24/19 1419      Electronically signed by Mike Huddleston RN at 11/24/19 1419     Mike Huddleston RN at 11/24/19 1540        Hugo LEAVITT and Ana MARTINEZ at bedside to attempt to apply TSLO brace.  Pt not tolerate movement and c/o pain 10/10.    Flowers notified.  Ok to repeat dilaudid per Mike Alcala, RN  11/24/19 1547      Electronically signed by Mike Huddleston, RN at 11/24/19 1547       Vital Signs (last 3 days)     Date/Time   Temp   Temp src   Pulse   Resp   BP   Patient Position   SpO2    11/25/19 0731   97.9 (36.6)   Oral   60   16   126/69   Lying   92    11/25/19 0400   98 (36.7)   Oral   61   16   120/56   Lying   91    11/24/19 2327   98.4 (36.9)   Oral   70   16   159/70 nurse notified   Lying   92    BP: nurse notified at 11/24/19 2327 11/24/19 2059   98.4 (36.9)   Oral   64   16   148/74   Lying   92    11/24/19 1708   98.3 (36.8)   Oral   62   16   171/74   Lying   93    11/24/19 16:17:52   98.7 (37.1)   Oral   —   —   —   —   —    11/24/19 1615   —   —   61   —   143/69   —   —    11/24/19 1611   —   —   59   —   —   —   95    11/24/19 1515   —   —   —   —   139/83   —   92    11/24/19 1445   —   —   60   —   149/64   —   93    11/24/19 1437   —   —   56   —   142/69   —   97    11/24/19 1415   —   —   61   13   140/72   —   96    11/24/19 1400   —   —   —   —   137/69   —   93    11/24/19 1356   —   —   62   —   —   —   95    11/24/19 1300   98.3 (36.8)   Oral   54   15   154/72   Lying   95              Oxygen Therapy (last 3 days)     Date/Time   SpO2   Device (Oxygen Therapy)   Flow (L/min)   Oxygen Concentration (%)   ETCO2 (mmHg)    11/25/19 0731   92   room air   —   —   —    11/25/19 0723   —   room air   —   —   —    11/25/19 0400   91   room air   —   —   —    11/24/19 2327   92   room air   —   —   —    11/24/19 2059   92   room air   —   —   —    11/24/19 2006   —   room air   —   —   —    11/24/19 1708   93   room air   —   —   —    11/24/19 1611   95   —   —   —   —    11/24/19 1515   92   —   —   —   —    11/24/19 1445   93   —   —   —   —    11/24/19 1437   97   —   —   —   —    11/24/19 1415   96   —   —   —   —    11/24/19 1400   93   —   —   —   —    11/24/19 1356   95   —   —   —   —       11/24/19 1300   95   room air   —   —   —            Intake & Output (last 3 days)       11/22 0701 - 11/23 0700 11/23 0701 - 11/24 0700 11/24 0701 - 11/25 0700 11/25 0701 - 11/26 0700    Urine (mL/kg/hr)   400     Total Output   400     Net   -400                  Lines, Drains & Airways    Active LDAs     Name:   Placement date:   Placement time:   Site:   Days:    Peripheral IV 11/24/19 1330 Left Hand   11/24/19    1330    Hand   less than 1         Inactive LDAs     None                Hospital Medications (all)       Dose Frequency Start End    aluminum-magnesium hydroxide-simethicone (MAALOX MAX) 400-400-40 MG/5ML suspension 15 mL 15 mL Every 6 Hours PRN 11/24/2019     Sig - Route: Take 15 mL by mouth Every 6 (Six) Hours As Needed for Heartburn. - Oral    HYDROmorphone (DILAUDID) injection solution 1 mg 1 mg Once 11/24/2019 11/24/2019    Sig - Route: Infuse 1 mL into a venous catheter 1 (One) Time. - Intravenous    HYDROmorphone (DILAUDID) injection solution 1 mg 1 mg Once 11/24/2019 11/24/2019    Sig - Route: Infuse 1 mL into a venous catheter 1 (One) Time. - Intravenous    HYDROmorphone (DILAUDID) injection solution 1 mg 1 mg Once 11/24/2019 11/24/2019    Sig - Route: Infuse 1 mL into a venous catheter 1 (One) Time. - Intravenous    lidocaine (LIDODERM) 5 % 1 patch 1 patch Every 24 Hours 11/24/2019     Sig - Route: Place 1 patch on the skin as directed by provider Daily. - Transdermal    metFORMIN (GLUCOPHAGE) tablet 500 mg 500 mg Daily With Breakfast 11/25/2019     Sig - Route: Take 1 tablet by mouth Daily With Breakfast. - Oral    morphine injection 4 mg 4 mg Once 11/24/2019 11/24/2019    Sig - Route: Infuse 1 mL into a venous catheter 1 (One) Time. - Intravenous    ondansetron (ZOFRAN) injection 4 mg 4 mg Once 11/24/2019 11/24/2019    Sig - Route: Infuse 2 mL into a venous catheter 1 (One) Time. - Intravenous    ondansetron (ZOFRAN) injection 4 mg 4 mg Every 6 Hours PRN 11/24/2019     Sig - Route:  "Infuse 2 mL into a venous catheter Every 6 (Six) Hours As Needed for Nausea or Vomiting. - Intravenous    oxyCODONE-acetaminophen (PERCOCET) 7.5-325 MG per tablet 1 tablet 1 tablet Every 4 Hours PRN 11/24/2019 12/4/2019    Sig - Route: Take 1 tablet by mouth Every 4 (Four) Hours As Needed for Moderate Pain . - Oral    oxyCODONE-acetaminophen (PERCOCET) 7.5-325 MG per tablet 2 tablet 2 tablet Every 4 Hours PRN 11/24/2019 12/4/2019    Sig - Route: Take 2 tablets by mouth Every 4 (Four) Hours As Needed for Severe Pain . - Oral    senna-docusate sodium (SENOKOT-S) 8.6-50 MG tablet 2 tablet 2 tablet 2 Times Daily 11/24/2019     Sig - Route: Take 2 tablets by mouth 2 (Two) Times a Day. - Oral    sodium chloride 0.9 % flush 10 mL 10 mL As Needed 11/24/2019     Sig - Route: Infuse 10 mL into a venous catheter As Needed for Line Care. - Intravenous    Linked Group 1:  \"And\" Linked Group Details        sodium chloride 0.9 % flush 10 mL 10 mL Every 12 Hours Scheduled 11/24/2019     Sig - Route: Infuse 10 mL into a venous catheter Every 12 (Twelve) Hours. - Intravenous    sodium chloride 0.9 % flush 10 mL 10 mL As Needed 11/24/2019     Sig - Route: Infuse 10 mL into a venous catheter As Needed for Line Care. - Intravenous          Blood Administration Record (From admission, onward)    None        Lab Results (last 72 hours)     Procedure Component Value Units Date/Time    [place] Draw [[id number]] Collected:  11/24/19 1334    Specimen:  Blood Updated:  11/24/19 1446    Narrative:       The following orders were created for panel order [place] Draw.  Procedure                               Abnormality         Status                     ---------                               -----------         ------                     Light Blue Top[[id number]]                                   Final result               Green Top (Gel)[[id number]]                                  Final result               Lavender Top[[id number]]               "                       Final result               Red Top[[id number]]                                          Final result                 Please view results for these tests on the individual orders.    Light Blue Top [[id number]] Collected:  11/24/19 1334    Specimen:  Blood Updated:  11/24/19 1446     Extra Tube hold for add-on     Comment: Auto resulted       Green Top (Gel) [[id number]] Collected:  11/24/19 1334    Specimen:  Blood Updated:  11/24/19 1446     Extra Tube Hold for add-ons.     Comment: Auto resulted.       Lavender Top [[id number]] Collected:  11/24/19 1334    Specimen:  Blood Updated:  11/24/19 1446     Extra Tube hold for add-on     Comment: Auto resulted       Red Top [[id number]] Collected:  11/24/19 1334    Specimen:  Blood Updated:  11/24/19 1446     Extra Tube Hold for add-ons.     Comment: Auto resulted.       Comprehensive Metabolic Panel [[id number]]  (Abnormal) Collected:  11/24/19 1334    Specimen:  Blood Updated:  11/24/19 1409     Glucose 145 mg/dL      BUN 10 mg/dL      Creatinine 0.79 mg/dL      Sodium 137 mmol/L      Potassium 4.4 mmol/L      Chloride 100 mmol/L      CO2 27.0 mmol/L      Calcium 9.7 mg/dL      Total Protein 6.3 g/dL      Albumin 4.10 g/dL      ALT (SGPT) 48 U/L      AST (SGOT) 35 U/L      Comment: Specimen hemolyzed.  Results may be affected.        Alkaline Phosphatase 56 U/L      Total Bilirubin 0.3 mg/dL      eGFR Non African Amer 103 mL/min/1.73      Globulin 2.2 gm/dL      A/G Ratio 1.9 g/dL      BUN/Creatinine Ratio 12.7     Anion Gap 10.0 mmol/L     Narrative:       GFR Normal >60  Chronic Kidney Disease <60  Kidney Failure <15    Protime-INR [[id number]]  (Normal) Collected:  11/24/19 1334    Specimen:  Blood Updated:  11/24/19 1350     Protime 13.6 Seconds      INR 1.01    aPTT [[id number]]  (Normal) Collected:  11/24/19 1334    Specimen:  Blood Updated:  11/24/19 1350     PTT 27.7 seconds     CBC & Differential [[id number]] Collected:  11/24/19 1334    Specimen:   Blood Updated:  11/24/19 1342    Narrative:       The following orders were created for panel order CBC & Differential.  Procedure                               Abnormality         Status                     ---------                               -----------         ------                     CBC Auto Differential[[id number]]        Abnormal            Final result                 Please view results for these tests on the individual orders.    CBC Auto Differential [[id number]]  (Abnormal) Collected:  11/24/19 1334    Specimen:  Blood Updated:  11/24/19 1342     WBC 12.40 10*3/mm3      RBC 5.45 10*6/mm3      Hemoglobin 14.9 g/dL      Hematocrit 44.4 %      MCV 81.5 fL      MCH 27.3 pg      MCHC 33.6 g/dL      RDW 13.8 %      RDW-SD 40.1 fl      MPV 10.6 fL      Platelets 229 10*3/mm3      Neutrophil % 66.8 %      Lymphocyte % 22.0 %      Monocyte % 8.1 %      Eosinophil % 1.8 %      Basophil % 0.6 %      Immature Grans % 0.7 %      Neutrophils, Absolute 8.27 10*3/mm3      Lymphocytes, Absolute 2.73 10*3/mm3      Monocytes, Absolute 1.01 10*3/mm3      Eosinophils, Absolute 0.22 10*3/mm3      Basophils, Absolute 0.08 10*3/mm3      Immature Grans, Absolute 0.09 10*3/mm3      nRBC 0.0 /100 WBC           Imaging Results (Last 72 Hours)     Procedure Component Value Units Date/Time    CT Lumbar Spine Without Contrast [[id number]] Collected:  11/24/19 1437     Updated:  11/24/19 1443    Narrative:       CT LUMBAR SPINE WO CONTRAST- 11/24/2019 2:27 PM CST     HISTORY: acute fall. Possible fracture of L1 on other x-rays at another  hospital.     COMPARISON: None      DOSE LENGTH PRODUCT: 1259 mGy cm. Automated exposure control was also  utilized to decrease patient radiation dose.     TECHNIQUE: Helical tomographic images of the lumbar spine were obtained  without the use of contrast. Multiplanar reformatted images were  provided for review.      FINDINGS:   Alignment: Normal.     Bones: There is an acute fracture through  the superior endplate of L1.  Minimal height loss is noted. The fracture extends into the LEFT  posterior aspect of the vertebral body but does not extend into the  pedicle. No other fractures are identified.     Disc spaces: Mild disc space narrowing is present at L1-L2 and L2-L3.  There are small disc bulges at L4-L5 and L5-S1.     Canal and neuroforamina: Moderate facet hypertrophy is present at L4-L5,  resulting in moderate bony narrowing of the neuroforamina. There is no  bony narrowing of the spinal canal.     Soft tissues: Unremarkable.       Impression:       1. Acute, comminuted compression fracture through the superior endplate  of L1 that extends into the posterior aspect of the vertebral body but  does not extend into the pedicles. Approximately 10% vertebral body  height loss.  2. Mild to moderate degenerative changes.        This report was finalized on 11/24/2019 14:40 by Dr. Les Young MD.          Orders (last 48 hrs)     Start     Ordered    11/25/19 0920  MRI Lumbar Spine Without Contrast  1 Time Imaging      11/25/19 0920 11/25/19 0920  PT Consult: Eval & Treat As Tolerated; Functional Mobility Below Baseline  Once     Comments:  In TLSO brace.    11/25/19 0920    11/25/19 0800  metFORMIN (GLUCOPHAGE) tablet 500 mg  Daily With Breakfast      11/24/19 1719    11/25/19 0000  XR Spine Lumbar 2 or 3 View     Comments:  2 VIEW LATERAL ONLY.  UPRIGHT AND SUPINE IN TLSO BRACE    THANK YOU    11/25/19 0921    11/24/19 2100  sodium chloride 0.9 % flush 10 mL  Every 12 Hours Scheduled      11/24/19 1730    11/24/19 2100  senna-docusate sodium (SENOKOT-S) 8.6-50 MG tablet 2 tablet  2 Times Daily      11/24/19 1730    11/24/19 2000  Vital Signs  Every 4 Hours      11/24/19 1730    11/24/19 1800  lidocaine (LIDODERM) 5 % 1 patch  Every 24 Hours      11/24/19 1731    11/24/19 1729  aluminum-magnesium hydroxide-simethicone (MAALOX MAX) 400-400-40 MG/5ML suspension 15 mL  Every 6 Hours PRN      11/24/19  1730    11/24/19 1729  ondansetron (ZOFRAN) injection 4 mg  Every 6 Hours PRN      11/24/19 1730    11/24/19 1729  oxyCODONE-acetaminophen (PERCOCET) 7.5-325 MG per tablet 2 tablet  Every 4 Hours PRN      11/24/19 1730    11/24/19 1729  oxyCODONE-acetaminophen (PERCOCET) 7.5-325 MG per tablet 1 tablet  Every 4 Hours PRN      11/24/19 1730    11/24/19 1723  Inpatient Neurosurgery Consult  Once     Specialty:  Neurosurgery  Provider:  Stephen Weaver MD    11/24/19 1730    11/24/19 1722  Code Status and Medical Interventions:  Continuous      11/24/19 1730    11/24/19 1722  Intake & Output  Every Shift      11/24/19 1730    11/24/19 1722  Weigh Patient  Once      11/24/19 1730    11/24/19 1722  Oxygen Therapy- Nasal Cannula; Titrate for SPO2: 90% - 95%  Continuous,   Status:  Canceled      11/24/19 1730    11/24/19 1722  Insert Peripheral IV  Once      11/24/19 1730    11/24/19 1722  Saline Lock & Maintain IV Access  Continuous      11/24/19 1730    11/24/19 1722  Place Sequential Compression Device  Once      11/24/19 1730    11/24/19 1722  Maintain Sequential Compression Device  Continuous      11/24/19 1730    11/24/19 1722  Diet Regular; Consistent Carbohydrate  Diet Effective Now      11/24/19 1730    11/24/19 1721  sodium chloride 0.9 % flush 10 mL  As Needed      11/24/19 1730    11/24/19 1609  Initiate Observation Status  Once      11/24/19 1609    11/24/19 1605  HYDROmorphone (DILAUDID) injection solution 1 mg  Once      11/24/19 1603    11/24/19 1549  HYDROmorphone (DILAUDID) injection solution 1 mg  Once      11/24/19 1547    11/24/19 1511  Obtain & Apply The Following- Back/Torso; Back brace (hard)  Once     Comments:  Please apply a TLSO BRACE    11/24/19 1510    11/24/19 1418  HYDROmorphone (DILAUDID) injection solution 1 mg  Once      11/24/19 1416    11/24/19 1351  Comprehensive Metabolic Panel  Once      11/24/19 1315    11/24/19 1346  Opioid Administration - Continuous Pulse Oximetry (SpO2)  Monitoring  Continuous      11/24/19 1345    11/24/19 1346  Opioid Administration - Document SpO2 Value With Each Set of Vitals & Any Change in Patient Status  Continuous      11/24/19 1345    11/24/19 1346  Opioid Administration - Notify Provider Pulse Oximetry (SpO2)  Until Discontinued      11/24/19 1345    11/24/19 1321  [place] Draw  STAT      11/24/19 1320    11/24/19 1321  Light Blue Top  PROCEDURE ONCE      11/24/19 1320    11/24/19 1321  Green Top (Gel)  PROCEDURE ONCE      11/24/19 1320    11/24/19 1321  Lavender Top  PROCEDURE ONCE      11/24/19 1320    11/24/19 1321  Red Top  PROCEDURE ONCE      11/24/19 1320    11/24/19 1317  morphine injection 4 mg  Once      11/24/19 1315    11/24/19 1317  ondansetron (ZOFRAN) injection 4 mg  Once      11/24/19 1315    11/24/19 1315  CBC & Differential  Once      11/24/19 1315    11/24/19 1315  Protime-INR  Once      11/24/19 1315    11/24/19 1315  aPTT  Once      11/24/19 1315    11/24/19 1315  Insert peripheral IV  Once      11/24/19 1315    11/24/19 1315  CT Lumbar Spine Without Contrast  1 Time Imaging      11/24/19 1315    11/24/19 1315  CBC Auto Differential  PROCEDURE ONCE      11/24/19 1315    11/24/19 1314  sodium chloride 0.9 % flush 10 mL  As Needed      11/24/19 1315    Unscheduled  Up With Assistance  As Needed      11/24/19 1730    Unscheduled  Oxygen Therapy- Nasal Cannula; Titrate for SPO2: 90% - 95%  Continuous PRN      11/25/19 0735    --  diclofenac (VOLTAREN) 1 % gel gel  4 Times Daily PRN      11/24/19 1302        Ventilator/Non-Invasive Ventilation Settings (From admission, onward)    None        Operative/Procedure Notes (all)     No notes of this type exist for this encounter.           Physician Progress Notes (last 72 hours) (Notes from 11/22/19 1153 through 11/25/19 1153)      Noel Villegas APRN at 11/25/19 0913     Attestation signed by Stephen Weaver MD at 11/25/19 1915    I have reviewed the documentation above and agree. I have  examined the patient and developed the treatment plan in conjunction with JACK Serna                      NEUROSURGERY DAILY PROGRESS NOTE    ASSESSMENT:   Gordy Erwin is a 54 y.o. with a significant comorbidity of obesity and diabetes.  He presents with a new problem of acute onset of back pain after fall without radiation. Physical exam findings of neurologically intact with tenderness to palpation over thoracal lumbar junction.  Their imaging shows 10% compression fracture of L1 vertebral body.    DDX:     Intractable back pain    Compression fracture of L1 lumbar vertebra (CMS/HCC)    Diabetes mellitus (CMS/HCC)    Morbid obesity (CMS/HCC)    Patient Active Problem List   Diagnosis   • Compression fracture of L1 lumbar vertebra (CMS/HCC)   • Diabetes mellitus (CMS/HCC)   • Morbid obesity (CMS/HCC)   • Intractable back pain     HPI: Back pain improving.  TLSO brace at bedside.  No acute events overnight.    PLAN:   Neuro: Stable.  Neuro unchanged.   L1 compression fracture   MRI of the lumbar spine today   TLSO brace when out of bed.  CV: No acute issues.  Pulm: No acute issues.  Maintaining O2 sat.  : No acute issues.  Voiding spontaneously.  FEN: No acute issues.  Tolerating regular diet.  GI: No acute issues.  ID: No acute issues.  Heme:  DVT prophylaxis; SCDs  Pain: No acute issues.  Dispo: PT/OT.   Tentative plan: If MRI stable, ready to discharge from neurosurgical perspective.   Max analgesics for pain.   Follow-up in the neurosurgical clinic on an outpatient basis in 3 weeks.   X-rays of the lumbar spine upright and supine in TLSO brace prior to appointment.   We will continue to follow as needed.     CHIEF COMPLAINT:   Back pain post fall    Subjective  Symptoms stable.  Doing well    Temp:  [97.9 °F (36.6 °C)-98.7 °F (37.1 °C)] 97.9 °F (36.6 °C)  Heart Rate:  [54-70] 60  Resp:  [13-16] 16  BP: (120-171)/(56-83) 126/69    Objective:  General Appearance:  Comfortable, well-appearing and in  "no acute distress.    Vital signs: (most recent): Blood pressure 126/69, pulse 60, temperature 97.9 °F (36.6 °C), temperature source Oral, resp. rate 16, height 200.7 cm (79\"), weight (!) 177 kg (391 lb), SpO2 92 %.        Neurologic Exam     Mental Status   Oriented to person, place, and time.   Attention: normal. Concentration: normal.   Speech: speech is normal   Level of consciousness: alert    Alert.  Bright and awake.  Follows commands.     Cranial Nerves     CN II   Visual fields full to confrontation.     CN III, IV, VI   Pupils are equal, round, and reactive to light.  Extraocular motions are normal.     CN V   Facial sensation intact.     CN VII   Facial expression full, symmetric.     CN VIII   CN VIII normal.     CN IX, X   CN IX normal.     CN XI   CN XI normal.     Motor Exam   Muscle bulk: normal  Overall muscle tone: normal  Right arm tone: normal  Left arm tone: normal  Right arm pronator drift: absent  Left arm pronator drift: absent  Right leg tone: normal  Left leg tone: normal    Strength   Right deltoid: 5/5  Left deltoid: 5/5  Right biceps: 5/5  Left biceps: 5/5  Right triceps: 5/5  Left triceps: 5/5  Right wrist extension: 5/5  Left wrist extension: 5/5  Right iliopsoas: 5/5  Left iliopsoas: 5/5  Right quadriceps: 5/5  Left quadriceps: 5/5  Right anterior tibial: 5/5  Left anterior tibial: 5/5  Right posterior tibial: 5/5  Left posterior tibial: 5/5  Moves all extremities.     Sensory Exam   Light touch normal.     Gait, Coordination, and Reflexes     Tremor   Resting tremor: absent  Intention tremor: absent  Action tremor: absent    Drains:  NA    Imaging Results (Last 24 Hours)     Procedure Component Value Units Date/Time    CT Lumbar Spine Without Contrast [[id number]] Collected:  11/24/19 1437     Updated:  11/24/19 1443    Narrative:       CT LUMBAR SPINE WO CONTRAST- 11/24/2019 2:27 PM CST     HISTORY: acute fall. Possible fracture of L1 on other x-rays at another  hospital.     "   COMPARISON: None      DOSE LENGTH PRODUCT: 1259 mGy cm. Automated exposure control was also  utilized to decrease patient radiation dose.     TECHNIQUE: Helical tomographic images of the lumbar spine were obtained  without the use of contrast. Multiplanar reformatted images were  provided for review.      FINDINGS:   Alignment: Normal.     Bones: There is an acute fracture through the superior endplate of L1.  Minimal height loss is noted. The fracture extends into the LEFT  posterior aspect of the vertebral body but does not extend into the  pedicle. No other fractures are identified.     Disc spaces: Mild disc space narrowing is present at L1-L2 and L2-L3.  There are small disc bulges at L4-L5 and L5-S1.     Canal and neuroforamina: Moderate facet hypertrophy is present at L4-L5,  resulting in moderate bony narrowing of the neuroforamina. There is no  bony narrowing of the spinal canal.     Soft tissues: Unremarkable.       Impression:       1. Acute, comminuted compression fracture through the superior endplate  of L1 that extends into the posterior aspect of the vertebral body but  does not extend into the pedicles. Approximately 10% vertebral body  height loss.  2. Mild to moderate degenerative changes.        This report was finalized on 11/24/2019 14:40 by Dr. Les Young MD.        Lab Results (last 24 hours)     Procedure Component Value Units Date/Time    [place] Draw [[id number]] Collected:  11/24/19 1334    Specimen:  Blood Updated:  11/24/19 1446    Narrative:       The following orders were created for panel order [place] Draw.  Procedure                               Abnormality         Status                     ---------                               -----------         ------                     Light Blue Top[[id number]]                                   Final result               Green Top (Gel)[[id number]]                                  Final result               Lavender Top[[id number]]                                      Final result               Red Top[[id number]]                                          Final result                 Please view results for these tests on the individual orders.    Light Blue Top [[id number]] Collected:  11/24/19 1334    Specimen:  Blood Updated:  11/24/19 1446     Extra Tube hold for add-on     Comment: Auto resulted       Green Top (Gel) [[id number]] Collected:  11/24/19 1334    Specimen:  Blood Updated:  11/24/19 1446     Extra Tube Hold for add-ons.     Comment: Auto resulted.       Lavender Top [[id number]] Collected:  11/24/19 1334    Specimen:  Blood Updated:  11/24/19 1446     Extra Tube hold for add-on     Comment: Auto resulted       Red Top [[id number]] Collected:  11/24/19 1334    Specimen:  Blood Updated:  11/24/19 1446     Extra Tube Hold for add-ons.     Comment: Auto resulted.       Comprehensive Metabolic Panel [[id number]]  (Abnormal) Collected:  11/24/19 1334    Specimen:  Blood Updated:  11/24/19 1409     Glucose 145 mg/dL      BUN 10 mg/dL      Creatinine 0.79 mg/dL      Sodium 137 mmol/L      Potassium 4.4 mmol/L      Chloride 100 mmol/L      CO2 27.0 mmol/L      Calcium 9.7 mg/dL      Total Protein 6.3 g/dL      Albumin 4.10 g/dL      ALT (SGPT) 48 U/L      AST (SGOT) 35 U/L      Comment: Specimen hemolyzed.  Results may be affected.        Alkaline Phosphatase 56 U/L      Total Bilirubin 0.3 mg/dL      eGFR Non African Amer 103 mL/min/1.73      Globulin 2.2 gm/dL      A/G Ratio 1.9 g/dL      BUN/Creatinine Ratio 12.7     Anion Gap 10.0 mmol/L     Narrative:       GFR Normal >60  Chronic Kidney Disease <60  Kidney Failure <15    Protime-INR [[id number]]  (Normal) Collected:  11/24/19 1334    Specimen:  Blood Updated:  11/24/19 1350     Protime 13.6 Seconds      INR 1.01    aPTT [[id number]]  (Normal) Collected:  11/24/19 1334    Specimen:  Blood Updated:  11/24/19 1350     PTT 27.7 seconds     CBC & Differential [[id number]] Collected:  11/24/19 1334     Specimen:  Blood Updated:  11/24/19 1342    Narrative:       The following orders were created for panel order CBC & Differential.  Procedure                               Abnormality         Status                     ---------                               -----------         ------                     CBC Auto Differential[[id number]]        Abnormal            Final result                 Please view results for these tests on the individual orders.    CBC Auto Differential [[id number]]  (Abnormal) Collected:  11/24/19 1334    Specimen:  Blood Updated:  11/24/19 1342     WBC 12.40 10*3/mm3      RBC 5.45 10*6/mm3      Hemoglobin 14.9 g/dL      Hematocrit 44.4 %      MCV 81.5 fL      MCH 27.3 pg      MCHC 33.6 g/dL      RDW 13.8 %      RDW-SD 40.1 fl      MPV 10.6 fL      Platelets 229 10*3/mm3      Neutrophil % 66.8 %      Lymphocyte % 22.0 %      Monocyte % 8.1 %      Eosinophil % 1.8 %      Basophil % 0.6 %      Immature Grans % 0.7 %      Neutrophils, Absolute 8.27 10*3/mm3      Lymphocytes, Absolute 2.73 10*3/mm3      Monocytes, Absolute 1.01 10*3/mm3      Eosinophils, Absolute 0.22 10*3/mm3      Basophils, Absolute 0.08 10*3/mm3      Immature Grans, Absolute 0.09 10*3/mm3      nRBC 0.0 /100 WBC         [id number]  MIKE Darby        Electronically signed by Stephen Weaver MD at 11/25/19 0913     Stephen Weaver MD at 11/24/19 1638          NEUROSURGERY INITIAL HOSPITAL ENCOUNTER    Assessment/Plan:   Gordy Erwin is a 54 y.o. male with a significant comorbidity of obesity and diabetes.  He presents with a new problem of acute onset of back pain after fall without radiation. Physical exam findings of neurologically intact with tenderness to palpation over thoracal lumbar junction.  Their imaging shows 10% compression fracture of L1 vertebral body.    Differential Diagnosis:   Acute L1 compression fracture    Recommendations:  I discussed the radiographic imaging and prognosis with  Gordy and his wife.  He has an acute traumatic L1 compression fracture of approximately 10% height loss.  There is no involvement of the posterior elements.  Given that he is not elderly and does not have osteoporosis, nor any risk factors for osteoporosis, he is not a candidate for kyphoplasty.  He is also a non-smoker.  Therefore his fracture should heal on his brace.  This should take approximately 3 months.  Would recommend aggressive pain management.  The patient is allergic to Toradol.  Therefore would recommend Tylenol, narcotics, lidocaine patches, gabapentin, and Valium as needed.  Typically these fractures will require 2 to 3 days of bedrest followed by slow progressive ambulation.  2 weeks off work as appropriate.  His fracture is intrinsically stable.  Would recommend TLSO brace for comfort but is not needed for stability.  Please notify neurosurgery should the patient develop new weakness, bowel or bladder dysfunction, or difficulty with gait.    I discussed the patients findings and my recommendations with patient, family and consulting provider    Thank you very much for this interesting consult.     Level of Risk: High due to:  illness with threat to life or bodily function  MDM: Moderate Complexity  Mod = 23881, High=99223  ___________________________________________________________________    Reason for consult  Fall from standing height    Chief Complaint:   Chief Complaint   Patient presents with   • Back Pain       HPI: Gordy Erwin is a 54 y.o. male with a significant comorbidity diabetes, morbid obesity.  Of note his wife recently had a two-level fusion by Dr. Kaplan.  The patient was in her normal state of health until earlier today when he slipped going down a wheelchair ramp at his house.  His feet came out from underneath him and he landed on his buttocks.  He had acute onset of back pain.  He went to an outside hospital where x-rays were performed and read as negative.  They were  not pleased with the care there and were called back for CT scan and therefore sought care at [facility] emergency room.    On admission here he was complaining of 10 out of 10 pain.  This was localized to his back.  This worsened with twisting or sitting up.  There is no radiation into his legs.  He has no numbness or weakness.  He did have difficulty getting in a car with the assistance of the spells.  There is no bowel or bladder incontinence.    Noncontrast CT scan revealed a 10% compression fracture of his lumbar spine.    Review of Systems   Constitutional: Negative.    HENT: Negative.    Eyes: Negative.    Respiratory: Negative.    Cardiovascular: Negative.    Gastrointestinal: Negative.    Endocrine: Negative.    Genitourinary: Negative.    Musculoskeletal: Positive for back pain and gait problem.   Skin: Negative.    Allergic/Immunologic: Negative.    Hematological: Negative.    Psychiatric/Behavioral: Negative.         Past Medical History:  has a past medical history of Diabetes mellitus (CMS/[facility]).    Past Surgical History:  has a past surgical history that includes Foot surgery (Right).    Family History: family history is not on file.    Social History:  reports that he quit smoking about 7 years ago. He has never used smokeless tobacco. He reports that he does not drink alcohol or use drugs.    Allergies: Penicillins; Contrast dye; Sulfa antibiotics; and Toradol [ketorolac tromethamine]    Home Medications:   Current Facility-Administered Medications:   •  [COMPLETED] Insert peripheral IV, , , Once **AND** sodium chloride 0.9 % flush 10 mL, 10 mL, Intravenous, PRN, Benoit Ibrahim, DO    Current Outpatient Medications:   •  diclofenac (VOLTAREN) 1 % gel gel, Apply 4 g topically to the appropriate area as directed 4 (Four) Times a Day As Needed., Disp: , Rfl:   •  meloxicam (MOBIC) 15 MG tablet, Take 15 mg by mouth., Disp: , Rfl:   •  diclofenac (VOLTAREN) 75 MG EC tablet, Take 1 tablet by mouth 2  (Two) Times a Day., Disp: 14 tablet, Rfl: 0  •  metFORMIN (GLUCOPHAGE) 500 MG tablet, Take 500 mg by mouth Daily With Breakfast., Disp: , Rfl:   •  sildenafil (VIAGRA) 100 MG tablet, Take 100 mg by mouth., Disp: , Rfl:     Medications: Scheduled Meds:   Continuous Infusions:   PRN Meds:.•  [COMPLETED] Insert peripheral IV **AND** sodium chloride    Vital Signs  Temp:  [98.3 °F (36.8 °C)-98.7 °F (37.1 °C)] 98.7 °F (37.1 °C)  Heart Rate:  [54-62] 61  Resp:  [13-15] 13  BP: (137-154)/(64-83) 143/69    Physical Exam  Physical Exam   Constitutional: He is oriented to person, place, and time. He appears well-developed and well-nourished.   HENT:   Head: Normocephalic and atraumatic.   Eyes: Conjunctivae and EOM are normal. Pupils are equal, round, and reactive to light.   Fundoscopic exam:       The right eye shows no exudate, no hemorrhage and no papilledema.        The left eye shows no exudate, no hemorrhage and no papilledema.   Neck: Normal range of motion. Neck supple.   Cardiovascular:   Pulses:       Dorsalis pedis pulses are 2+ on the right side, and 2+ on the left side.        Posterior tibial pulses are 2+ on the right side, and 2+ on the left side.   Pulmonary/Chest: Effort normal. No respiratory distress.     Vascular Status -  His right foot exhibits no edema. His left foot exhibits no edema.  Neurological: He is oriented to person, place, and time. He has a normal Finger-Nose-Finger Test, a normal Heel to Benjamin Test and a normal Tandem Gait Test. Gait normal.   Skin: Skin is warm. No rash noted. No erythema.   Psychiatric: His speech is normal.       Neurologic Exam     Mental Status   Oriented to person, place, and time.   Registration: recalls 3 of 3 objects. Recall at 5 minutes: recalls 3 of 3 objects.   Attention: normal. Concentration: normal.   Speech: speech is normal   Knowledge: consistent with education.     Cranial Nerves     CN II   Visual acuity: normal    CN III, IV, VI   Pupils are equal,  round, and reactive to light.  Extraocular motions are normal.   Diplopia: none    CN V   Facial sensation intact.   Right corneal reflex: normal  Left corneal reflex: normal    CN VII   Right facial weakness: none  Left facial weakness: none    CN VIII   Hearing: intact    CN IX, X   Palate: symmetric  Right gag reflex: normal  Left gag reflex: normal    CN XI   Right trapezius strength: normal  Left trapezius strength: normal    CN XII   Tongue deviation: none    Motor Exam   Right arm tone: normal  Left arm tone: normal  Right arm pronator drift: absent  Left arm pronator drift: absent  Right leg tone: normal  Left leg tone: normal    Strength   Strength 5/5 except as noted.     Sensory Exam   Light touch normal.   Proprioception normal.     Gait, Coordination, and Reflexes     Gait  Gait: normal    Coordination   Finger to nose coordination: normal  Heel to shin coordination: normal  Tandem walking coordination: normal    Reflexes   Reflexes 2+ except as noted.   Right plantar: normal  Left plantar: normal  Right Reyes: absent  Left Reyes: absent    Tenderness to palpation over thoracolumbar junction.    Results Review:   Independent review and interpretation of imaging  Imaging Results (Last 24 Hours)     Procedure Component Value Units Date/Time    CT Lumbar Spine Without Contrast [[id number]] Collected:  11/24/19 1437     Updated:  11/24/19 1443    Narrative:       CT LUMBAR SPINE WO CONTRAST- 11/24/2019 2:27 PM CST     HISTORY: acute fall. Possible fracture of L1 on other x-rays at another  hospital.     COMPARISON: None      DOSE LENGTH PRODUCT: 1259 mGy cm. Automated exposure control was also  utilized to decrease patient radiation dose.     TECHNIQUE: Helical tomographic images of the lumbar spine were obtained  without the use of contrast. Multiplanar reformatted images were  provided for review.      FINDINGS:   Alignment: Normal.     Bones: There is an acute fracture through the superior endplate of  L1.  Minimal height loss is noted. The fracture extends into the LEFT  posterior aspect of the vertebral body but does not extend into the  pedicle. No other fractures are identified.     Disc spaces: Mild disc space narrowing is present at L1-L2 and L2-L3.  There are small disc bulges at L4-L5 and L5-S1.     Canal and neuroforamina: Moderate facet hypertrophy is present at L4-L5,  resulting in moderate bony narrowing of the neuroforamina. There is no  bony narrowing of the spinal canal.     Soft tissues: Unremarkable.       Impression:       1. Acute, comminuted compression fracture through the superior endplate  of L1 that extends into the posterior aspect of the vertebral body but  does not extend into the pedicles. Approximately 10% vertebral body  height loss.  2. Mild to moderate degenerative changes.        This report was finalized on 11/24/2019 14:40 by Dr. Les Young MD.        MRI brain:  MRI spine:   CT Head:  CT c-spine:  CT t-spine:  CT l-spine:  X-ray:    I reviewed the patient's new clinical results.  Lab Results (last 24 hours)     Procedure Component Value Units Date/Time    [place] Draw [[id number]] Collected:  11/24/19 1334    Specimen:  Blood Updated:  11/24/19 1446    Narrative:       The following orders were created for panel order [place] Draw.  Procedure                               Abnormality         Status                     ---------                               -----------         ------                     Light Blue Top[[id number]]                                   Final result               Green Top (Gel)[[id number]]                                  Final result               Lavender Top[[id number]]                                     Final result               Red Top[[id number]]                                          Final result                 Please view results for these tests on the individual orders.    Light Blue Top [[id number]] Collected:  11/24/19 1334    Specimen:   Blood Updated:  11/24/19 1446     Extra Tube hold for add-on     Comment: Auto resulted       Green Top (Gel) [[id number]] Collected:  11/24/19 1334    Specimen:  Blood Updated:  11/24/19 1446     Extra Tube Hold for add-ons.     Comment: Auto resulted.       Lavender Top [[id number]] Collected:  11/24/19 1334    Specimen:  Blood Updated:  11/24/19 1446     Extra Tube hold for add-on     Comment: Auto resulted       Red Top [[id number]] Collected:  11/24/19 1334    Specimen:  Blood Updated:  11/24/19 1446     Extra Tube Hold for add-ons.     Comment: Auto resulted.       Comprehensive Metabolic Panel [[id number]]  (Abnormal) Collected:  11/24/19 1334    Specimen:  Blood Updated:  11/24/19 1409     Glucose 145 mg/dL      BUN 10 mg/dL      Creatinine 0.79 mg/dL      Sodium 137 mmol/L      Potassium 4.4 mmol/L      Chloride 100 mmol/L      CO2 27.0 mmol/L      Calcium 9.7 mg/dL      Total Protein 6.3 g/dL      Albumin 4.10 g/dL      ALT (SGPT) 48 U/L      AST (SGOT) 35 U/L      Comment: Specimen hemolyzed.  Results may be affected.        Alkaline Phosphatase 56 U/L      Total Bilirubin 0.3 mg/dL      eGFR Non African Amer 103 mL/min/1.73      Globulin 2.2 gm/dL      A/G Ratio 1.9 g/dL      BUN/Creatinine Ratio 12.7     Anion Gap 10.0 mmol/L     Narrative:       GFR Normal >60  Chronic Kidney Disease <60  Kidney Failure <15    Protime-INR [[id number]]  (Normal) Collected:  11/24/19 1334    Specimen:  Blood Updated:  11/24/19 1350     Protime 13.6 Seconds      INR 1.01    aPTT [[id number]]  (Normal) Collected:  11/24/19 1334    Specimen:  Blood Updated:  11/24/19 1350     PTT 27.7 seconds     CBC & Differential [[id number]] Collected:  11/24/19 1334    Specimen:  Blood Updated:  11/24/19 1342    Narrative:       The following orders were created for panel order CBC & Differential.  Procedure                               Abnormality         Status                     ---------                               -----------          ------                     CBC Auto Differential[[id number]]        Abnormal            Final result                 Please view results for these tests on the individual orders.    CBC Auto Differential [[id number]]  (Abnormal) Collected:  11/24/19 1334    Specimen:  Blood Updated:  11/24/19 1342     WBC 12.40 10*3/mm3      RBC 5.45 10*6/mm3      Hemoglobin 14.9 g/dL      Hematocrit 44.4 %      MCV 81.5 fL      MCH 27.3 pg      MCHC 33.6 g/dL      RDW 13.8 %      RDW-SD 40.1 fl      MPV 10.6 fL      Platelets 229 10*3/mm3      Neutrophil % 66.8 %      Lymphocyte % 22.0 %      Monocyte % 8.1 %      Eosinophil % 1.8 %      Basophil % 0.6 %      Immature Grans % 0.7 %      Neutrophils, Absolute 8.27 10*3/mm3      Lymphocytes, Absolute 2.73 10*3/mm3      Monocytes, Absolute 1.01 10*3/mm3      Eosinophils, Absolute 0.22 10*3/mm3      Basophils, Absolute 0.08 10*3/mm3      Immature Grans, Absolute 0.09 10*3/mm3      nRBC 0.0 /100 WBC           Stephen Weaver MD          Electronically signed by Stephen Weaver MD at 11/24/19 1642     Stephen Weaver MD at 11/24/19 1510        Contacted from the emergency room.  Patient is a morbidly obese 54-year-old male who sustained a fall at home.  He had acute onset of back pain without radiation, numbness, weakness.  He has pain that is worsening with standing and relieved with laying down.    His imaging is reviewed remotely which shows an L1 10% compression fracture without evidence of instability.    Recommend maximal analgesics  TLSO  Rest and patient may follow-up with me in clinic next week  Given that this is a traumatic fracture in a non-osteoporotic patient kyphoplasty is not indicated.    Stephen Weaver MD      Electronically signed by Stephen Weaver MD at 11/24/19 1511       Consult Notes (last 72 hours) (Notes from 11/22/19 1153 through 11/25/19 1153)     No notes of this type exist for this encounter.      ED to Hosp-Admission      11/24/2019   Gordy Erwin   MRN: [id number]   All Administrations of morphine injection 4 mg     Administration Action Time Recorded Time Documented By Site Comment Reason   Given : 4 mg :   : Intravenous 11/24/19 1340 11/24/19 1340 Mike Huddleston RN        ED to Hosp-Admission   11/24/2019   Gordy Erwin   MRN: [id number]   All Administrations of HYDROmorphone (DILAUDID) injection solution 1 mg     Administration Action Time Recorded Time Documented By Site Comment Reason   Given : 1 mg :   : Intravenous 11/24/19 1551 11/24/19 1551 Mike Huddleston RN        ED to Hosp-Admission   11/24/2019   Gordy Erwin   MRN: [id number]   All Administrations of HYDROmorphone (DILAUDID) injection solution 1 mg     Administration Action Time Recorded Time Documented By Site Comment Reason   Given : 1 mg :   : Intravenous 11/24/19 1419 11/24/19 1419 Mike Huddleston RN

## 2019-11-25 NOTE — PROGRESS NOTES
NEUROSURGERY DAILY PROGRESS NOTE    ASSESSMENT:   Gordy Erwin is a 54 y.o. with a significant comorbidity of obesity and diabetes.  He presents with a new problem of acute onset of back pain after fall without radiation. Physical exam findings of neurologically intact with tenderness to palpation over thoracal lumbar junction.  Their imaging shows 10% compression fracture of L1 vertebral body.    DDX:     Intractable back pain    Compression fracture of L1 lumbar vertebra (CMS/HCC)    Diabetes mellitus (CMS/HCC)    Morbid obesity (CMS/HCC)    Patient Active Problem List   Diagnosis   • Compression fracture of L1 lumbar vertebra (CMS/HCC)   • Diabetes mellitus (CMS/HCC)   • Morbid obesity (CMS/HCC)   • Intractable back pain     HPI: Back pain improving.  TLSO brace at bedside.  No acute events overnight.    PLAN:   Neuro: Stable.  Neuro unchanged.   L1 compression fracture   MRI of the lumbar spine today   TLSO brace when out of bed.  CV: No acute issues.  Pulm: No acute issues.  Maintaining O2 sat.  : No acute issues.  Voiding spontaneously.  FEN: No acute issues.  Tolerating regular diet.  GI: No acute issues.  ID: No acute issues.  Heme:  DVT prophylaxis; SCDs  Pain: No acute issues.  Dispo: PT/OT.   Tentative plan: If MRI stable, ready to discharge from neurosurgical perspective.   Max analgesics for pain.   Follow-up in the neurosurgical clinic on an outpatient basis in 3 weeks.   X-rays of the lumbar spine upright and supine in TLSO brace prior to appointment.   We will continue to follow as needed.     CHIEF COMPLAINT:   Back pain post fall    Subjective  Symptoms stable.  Doing well    Temp:  [97.9 °F (36.6 °C)-98.7 °F (37.1 °C)] 97.9 °F (36.6 °C)  Heart Rate:  [54-70] 60  Resp:  [13-16] 16  BP: (120-171)/(56-83) 126/69    Objective:  General Appearance:  Comfortable, well-appearing and in no acute distress.    Vital signs: (most recent): Blood pressure 126/69, pulse 60, temperature 97.9 °F (36.6 °C),  "temperature source Oral, resp. rate 16, height 200.7 cm (79\"), weight (!) 177 kg (391 lb), SpO2 92 %.        Neurologic Exam     Mental Status   Oriented to person, place, and time.   Attention: normal. Concentration: normal.   Speech: speech is normal   Level of consciousness: alert    Alert.  Bright and awake.  Follows commands.     Cranial Nerves     CN II   Visual fields full to confrontation.     CN III, IV, VI   Pupils are equal, round, and reactive to light.  Extraocular motions are normal.     CN V   Facial sensation intact.     CN VII   Facial expression full, symmetric.     CN VIII   CN VIII normal.     CN IX, X   CN IX normal.     CN XI   CN XI normal.     Motor Exam   Muscle bulk: normal  Overall muscle tone: normal  Right arm tone: normal  Left arm tone: normal  Right arm pronator drift: absent  Left arm pronator drift: absent  Right leg tone: normal  Left leg tone: normal    Strength   Right deltoid: 5/5  Left deltoid: 5/5  Right biceps: 5/5  Left biceps: 5/5  Right triceps: 5/5  Left triceps: 5/5  Right wrist extension: 5/5  Left wrist extension: 5/5  Right iliopsoas: 5/5  Left iliopsoas: 5/5  Right quadriceps: 5/5  Left quadriceps: 5/5  Right anterior tibial: 5/5  Left anterior tibial: 5/5  Right posterior tibial: 5/5  Left posterior tibial: 5/5  Moves all extremities.     Sensory Exam   Light touch normal.     Gait, Coordination, and Reflexes     Tremor   Resting tremor: absent  Intention tremor: absent  Action tremor: absent    Drains:  NA    Imaging Results (Last 24 Hours)     Procedure Component Value Units Date/Time    CT Lumbar Spine Without Contrast [[id number]] Collected:  11/24/19 1437     Updated:  11/24/19 1443    Narrative:       CT LUMBAR SPINE WO CONTRAST- 11/24/2019 2:27 PM CST     HISTORY: acute fall. Possible fracture of L1 on other x-rays at another  hospital.     COMPARISON: None      DOSE LENGTH PRODUCT: 1259 mGy cm. Automated exposure control was also  utilized to decrease patient " radiation dose.     TECHNIQUE: Helical tomographic images of the lumbar spine were obtained  without the use of contrast. Multiplanar reformatted images were  provided for review.      FINDINGS:   Alignment: Normal.     Bones: There is an acute fracture through the superior endplate of L1.  Minimal height loss is noted. The fracture extends into the LEFT  posterior aspect of the vertebral body but does not extend into the  pedicle. No other fractures are identified.     Disc spaces: Mild disc space narrowing is present at L1-L2 and L2-L3.  There are small disc bulges at L4-L5 and L5-S1.     Canal and neuroforamina: Moderate facet hypertrophy is present at L4-L5,  resulting in moderate bony narrowing of the neuroforamina. There is no  bony narrowing of the spinal canal.     Soft tissues: Unremarkable.       Impression:       1. Acute, comminuted compression fracture through the superior endplate  of L1 that extends into the posterior aspect of the vertebral body but  does not extend into the pedicles. Approximately 10% vertebral body  height loss.  2. Mild to moderate degenerative changes.        This report was finalized on 11/24/2019 14:40 by Dr. Les Young MD.        Lab Results (last 24 hours)     Procedure Component Value Units Date/Time    [place] Draw [[id number]] Collected:  11/24/19 1334    Specimen:  Blood Updated:  11/24/19 1446    Narrative:       The following orders were created for panel order [place] Draw.  Procedure                               Abnormality         Status                     ---------                               -----------         ------                     Light Blue Top[[id number]]                                   Final result               Green Top (Gel)[[id number]]                                  Final result               Lavender Top[[id number]]                                     Final result               Red Top[[id number]]                                          Final  result                 Please view results for these tests on the individual orders.    Light Blue Top [[id number]] Collected:  11/24/19 1334    Specimen:  Blood Updated:  11/24/19 1446     Extra Tube hold for add-on     Comment: Auto resulted       Green Top (Gel) [[id number]] Collected:  11/24/19 1334    Specimen:  Blood Updated:  11/24/19 1446     Extra Tube Hold for add-ons.     Comment: Auto resulted.       Lavender Top [[id number]] Collected:  11/24/19 1334    Specimen:  Blood Updated:  11/24/19 1446     Extra Tube hold for add-on     Comment: Auto resulted       Red Top [[id number]] Collected:  11/24/19 1334    Specimen:  Blood Updated:  11/24/19 1446     Extra Tube Hold for add-ons.     Comment: Auto resulted.       Comprehensive Metabolic Panel [[id number]]  (Abnormal) Collected:  11/24/19 1334    Specimen:  Blood Updated:  11/24/19 1409     Glucose 145 mg/dL      BUN 10 mg/dL      Creatinine 0.79 mg/dL      Sodium 137 mmol/L      Potassium 4.4 mmol/L      Chloride 100 mmol/L      CO2 27.0 mmol/L      Calcium 9.7 mg/dL      Total Protein 6.3 g/dL      Albumin 4.10 g/dL      ALT (SGPT) 48 U/L      AST (SGOT) 35 U/L      Comment: Specimen hemolyzed.  Results may be affected.        Alkaline Phosphatase 56 U/L      Total Bilirubin 0.3 mg/dL      eGFR Non African Amer 103 mL/min/1.73      Globulin 2.2 gm/dL      A/G Ratio 1.9 g/dL      BUN/Creatinine Ratio 12.7     Anion Gap 10.0 mmol/L     Narrative:       GFR Normal >60  Chronic Kidney Disease <60  Kidney Failure <15    Protime-INR [[id number]]  (Normal) Collected:  11/24/19 1334    Specimen:  Blood Updated:  11/24/19 1350     Protime 13.6 Seconds      INR 1.01    aPTT [[id number]]  (Normal) Collected:  11/24/19 1334    Specimen:  Blood Updated:  11/24/19 1350     PTT 27.7 seconds     CBC & Differential [[id number]] Collected:  11/24/19 1334    Specimen:  Blood Updated:  11/24/19 1342    Narrative:       The following orders were created for panel order CBC &  Differential.  Procedure                               Abnormality         Status                     ---------                               -----------         ------                     CBC Auto Differential[[id number]]        Abnormal            Final result                 Please view results for these tests on the individual orders.    CBC Auto Differential [[id number]]  (Abnormal) Collected:  11/24/19 1334    Specimen:  Blood Updated:  11/24/19 1342     WBC 12.40 10*3/mm3      RBC 5.45 10*6/mm3      Hemoglobin 14.9 g/dL      Hematocrit 44.4 %      MCV 81.5 fL      MCH 27.3 pg      MCHC 33.6 g/dL      RDW 13.8 %      RDW-SD 40.1 fl      MPV 10.6 fL      Platelets 229 10*3/mm3      Neutrophil % 66.8 %      Lymphocyte % 22.0 %      Monocyte % 8.1 %      Eosinophil % 1.8 %      Basophil % 0.6 %      Immature Grans % 0.7 %      Neutrophils, Absolute 8.27 10*3/mm3      Lymphocytes, Absolute 2.73 10*3/mm3      Monocytes, Absolute 1.01 10*3/mm3      Eosinophils, Absolute 0.22 10*3/mm3      Basophils, Absolute 0.08 10*3/mm3      Immature Grans, Absolute 0.09 10*3/mm3      nRBC 0.0 /100 WBC         [id number]  Noel Villegas, APRN

## 2019-11-25 NOTE — PLAN OF CARE
Problem: Patient Care Overview  Goal: Plan of Care Review  Outcome: Ongoing (interventions implemented as appropriate)   11/25/19 0011   Coping/Psychosocial   Plan of Care Reviewed With patient   Plan of Care Review   Progress no change   OTHER   Outcome Summary Pt A&O X4. C/o pain in back, cramping, PRN meds given. States some improvement. VSS. PPP. Voiding per urinal. Self turning. . Will continue to monitor.

## 2019-11-25 NOTE — PLAN OF CARE
Problem: Patient Care Overview  Goal: Plan of Care Review  Outcome: Ongoing (interventions implemented as appropriate)   11/25/19 1113   Coping/Psychosocial   Plan of Care Reviewed With patient   Plan of Care Review   Progress improving   OTHER   Outcome Summary A&Ox4. C/o lower back pain. Prn pain medication given with some relief. Back brace to be worn when out of bed. MRI scheduled for today. Very pleasant. . VSS. Safety maintained. Will continue to monitor.      Goal: Individualization and Mutuality  Outcome: Ongoing (interventions implemented as appropriate)    Goal: Discharge Needs Assessment  Outcome: Ongoing (interventions implemented as appropriate)    Goal: Interprofessional Rounds/Family Conf  Outcome: Ongoing (interventions implemented as appropriate)      Problem: Pain, Chronic (Adult)  Goal: Identify Related Risk Factors and Signs and Symptoms  Outcome: Ongoing (interventions implemented as appropriate)    Goal: Acceptable Pain/Comfort Level and Functional Ability  Outcome: Ongoing (interventions implemented as appropriate)      Problem: Fall Risk (Adult)  Goal: Identify Related Risk Factors and Signs and Symptoms  Outcome: Ongoing (interventions implemented as appropriate)    Goal: Absence of Fall  Outcome: Ongoing (interventions implemented as appropriate)

## 2019-11-26 NOTE — PAYOR COMM NOTE
"DC HOME 11-25-19  [id number]  PHONE    [phone]    Gordy Bess (54 y.o. Male)     Date of Birth Social Security Number Address Home Phone MRN    1965  [address] [place] [address] [phone] [id number]    Ep[employer] Marital Status          Lutheran        Admission Date Admission Type Admitting Provider Attending Provider Department, Room/Bed    11/24/19 Emergency Robert Frazier, [facility] 3A, 345/1    Discharge Date Discharge Disposition Discharge Destination        11/25/2019 Home or Self Care              Attending Provider:  (none)   Allergies:  Penicillins, Contrast Dye, Sulfa Antibiotics, Toradol [Ketorolac Tromethamine]    Isolation:  None   Infection:  None   Code Status:  Prior    Ht:  200.7 cm (79\")   Wt:  177 kg (391 lb)    Admission Cmt:  None   Principal Problem:  Intractable back pain [M54.9]                 Active Insurance as of 11/24/2019     Primary Coverage     Payor Plan Insurance Group Employer/Plan Group    [facility] [employer] EMPL[employer]EE [id number]     Payor Plan Address Payor Plan Phone Number Payor Plan Fax Number Effective Dates    [address] [phone]  1/1/2015 - None Entered    [place] [address]       Subscriber Name Subscriber Birth Date Member ID       GORDY BESS 1965 [id number]                 Emergency Contacts      (Rel.) Home Phone Work Phone Mobile Phone    Nanda Bess (Spouse) [phone] -- [phone]            Operative/Procedure Notes (all)     No notes of this type exist for this encounter.           Physician Progress Notes (all)      Noel Villegas APRN at 11/25/19 0913     Attestation signed by Stephen Weaver MD at 11/25/19 0927    I have reviewed the documentation above and agree. I have examined the patient and developed the treatment plan in conjunction with JACK Serna                      NEUROSURGERY DAILY PROGRESS " NOTE    ASSESSMENT:   Gordy Erwin is a 54 y.o. with a significant comorbidity of obesity and diabetes.  He presents with a new problem of acute onset of back pain after fall without radiation. Physical exam findings of neurologically intact with tenderness to palpation over thoracal lumbar junction.  Their imaging shows 10% compression fracture of L1 vertebral body.    DDX:     Intractable back pain    Compression fracture of L1 lumbar vertebra (CMS/HCC)    Diabetes mellitus (CMS/HCC)    Morbid obesity (CMS/HCC)    Patient Active Problem List   Diagnosis   • Compression fracture of L1 lumbar vertebra (CMS/HCC)   • Diabetes mellitus (CMS/HCC)   • Morbid obesity (CMS/HCC)   • Intractable back pain     HPI: Back pain improving.  TLSO brace at bedside.  No acute events overnight.    PLAN:   Neuro: Stable.  Neuro unchanged.   L1 compression fracture   MRI of the lumbar spine today   TLSO brace when out of bed.  CV: No acute issues.  Pulm: No acute issues.  Maintaining O2 sat.  : No acute issues.  Voiding spontaneously.  FEN: No acute issues.  Tolerating regular diet.  GI: No acute issues.  ID: No acute issues.  Heme:  DVT prophylaxis; SCDs  Pain: No acute issues.  Dispo: PT/OT.   Tentative plan: If MRI stable, ready to discharge from neurosurgical perspective.   Max analgesics for pain.   Follow-up in the neurosurgical clinic on an outpatient basis in 3 weeks.   X-rays of the lumbar spine upright and supine in TLSO brace prior to appointment.   We will continue to follow as needed.     CHIEF COMPLAINT:   Back pain post fall    Subjective  Symptoms stable.  Doing well    Temp:  [97.9 °F (36.6 °C)-98.7 °F (37.1 °C)] 97.9 °F (36.6 °C)  Heart Rate:  [54-70] 60  Resp:  [13-16] 16  BP: (120-171)/(56-83) 126/69    Objective:  General Appearance:  Comfortable, well-appearing and in no acute distress.    Vital signs: (most recent): Blood pressure 126/69, pulse 60, temperature 97.9 °F (36.6 °C), temperature source Oral,  "resp. rate 16, height 200.7 cm (79\"), weight (!) 177 kg (391 lb), SpO2 92 %.        Neurologic Exam     Mental Status   Oriented to person, place, and time.   Attention: normal. Concentration: normal.   Speech: speech is normal   Level of consciousness: alert    Alert.  Bright and awake.  Follows commands.     Cranial Nerves     CN II   Visual fields full to confrontation.     CN III, IV, VI   Pupils are equal, round, and reactive to light.  Extraocular motions are normal.     CN V   Facial sensation intact.     CN VII   Facial expression full, symmetric.     CN VIII   CN VIII normal.     CN IX, X   CN IX normal.     CN XI   CN XI normal.     Motor Exam   Muscle bulk: normal  Overall muscle tone: normal  Right arm tone: normal  Left arm tone: normal  Right arm pronator drift: absent  Left arm pronator drift: absent  Right leg tone: normal  Left leg tone: normal    Strength   Right deltoid: 5/5  Left deltoid: 5/5  Right biceps: 5/5  Left biceps: 5/5  Right triceps: 5/5  Left triceps: 5/5  Right wrist extension: 5/5  Left wrist extension: 5/5  Right iliopsoas: 5/5  Left iliopsoas: 5/5  Right quadriceps: 5/5  Left quadriceps: 5/5  Right anterior tibial: 5/5  Left anterior tibial: 5/5  Right posterior tibial: 5/5  Left posterior tibial: 5/5  Moves all extremities.     Sensory Exam   Light touch normal.     Gait, Coordination, and Reflexes     Tremor   Resting tremor: absent  Intention tremor: absent  Action tremor: absent    Drains:  NA    Imaging Results (Last 24 Hours)     Procedure Component Value Units Date/Time    CT Lumbar Spine Without Contrast [[id number]] Collected:  11/24/19 1437     Updated:  11/24/19 1443    Narrative:       CT LUMBAR SPINE WO CONTRAST- 11/24/2019 2:27 PM CST     HISTORY: acute fall. Possible fracture of L1 on other x-rays at another  hospital.     COMPARISON: None      DOSE LENGTH PRODUCT: 1259 mGy cm. Automated exposure control was also  utilized to decrease patient radiation dose.   "   TECHNIQUE: Helical tomographic images of the lumbar spine were obtained  without the use of contrast. Multiplanar reformatted images were  provided for review.      FINDINGS:   Alignment: Normal.     Bones: There is an acute fracture through the superior endplate of L1.  Minimal height loss is noted. The fracture extends into the LEFT  posterior aspect of the vertebral body but does not extend into the  pedicle. No other fractures are identified.     Disc spaces: Mild disc space narrowing is present at L1-L2 and L2-L3.  There are small disc bulges at L4-L5 and L5-S1.     Canal and neuroforamina: Moderate facet hypertrophy is present at L4-L5,  resulting in moderate bony narrowing of the neuroforamina. There is no  bony narrowing of the spinal canal.     Soft tissues: Unremarkable.       Impression:       1. Acute, comminuted compression fracture through the superior endplate  of L1 that extends into the posterior aspect of the vertebral body but  does not extend into the pedicles. Approximately 10% vertebral body  height loss.  2. Mild to moderate degenerative changes.        This report was finalized on 11/24/2019 14:40 by Dr. Les Young MD.        Lab Results (last 24 hours)     Procedure Component Value Units Date/Time    [place] Draw [[id number]] Collected:  11/24/19 1334    Specimen:  Blood Updated:  11/24/19 1446    Narrative:       The following orders were created for panel order [place] Draw.  Procedure                               Abnormality         Status                     ---------                               -----------         ------                     Light Blue Top[[id number]]                                   Final result               Green Top (Gel)[[id number]]                                  Final result               Lavender Top[[id number]]                                     Final result               Red Top[[id number]]                                          Final result                  Please view results for these tests on the individual orders.    Light Blue Top [[id number]] Collected:  11/24/19 1334    Specimen:  Blood Updated:  11/24/19 1446     Extra Tube hold for add-on     Comment: Auto resulted       Green Top (Gel) [[id number]] Collected:  11/24/19 1334    Specimen:  Blood Updated:  11/24/19 1446     Extra Tube Hold for add-ons.     Comment: Auto resulted.       Lavender Top [[id number]] Collected:  11/24/19 1334    Specimen:  Blood Updated:  11/24/19 1446     Extra Tube hold for add-on     Comment: Auto resulted       Red Top [[id number]] Collected:  11/24/19 1334    Specimen:  Blood Updated:  11/24/19 1446     Extra Tube Hold for add-ons.     Comment: Auto resulted.       Comprehensive Metabolic Panel [[id number]]  (Abnormal) Collected:  11/24/19 1334    Specimen:  Blood Updated:  11/24/19 1409     Glucose 145 mg/dL      BUN 10 mg/dL      Creatinine 0.79 mg/dL      Sodium 137 mmol/L      Potassium 4.4 mmol/L      Chloride 100 mmol/L      CO2 27.0 mmol/L      Calcium 9.7 mg/dL      Total Protein 6.3 g/dL      Albumin 4.10 g/dL      ALT (SGPT) 48 U/L      AST (SGOT) 35 U/L      Comment: Specimen hemolyzed.  Results may be affected.        Alkaline Phosphatase 56 U/L      Total Bilirubin 0.3 mg/dL      eGFR Non African Amer 103 mL/min/1.73      Globulin 2.2 gm/dL      A/G Ratio 1.9 g/dL      BUN/Creatinine Ratio 12.7     Anion Gap 10.0 mmol/L     Narrative:       GFR Normal >60  Chronic Kidney Disease <60  Kidney Failure <15    Protime-INR [[id number]]  (Normal) Collected:  11/24/19 1334    Specimen:  Blood Updated:  11/24/19 1350     Protime 13.6 Seconds      INR 1.01    aPTT [[id number]]  (Normal) Collected:  11/24/19 1334    Specimen:  Blood Updated:  11/24/19 1350     PTT 27.7 seconds     CBC & Differential [[id number]] Collected:  11/24/19 1334    Specimen:  Blood Updated:  11/24/19 1342    Narrative:       The following orders were created for panel order CBC & Differential.  Procedure                                Abnormality         Status                     ---------                               -----------         ------                     CBC Auto Differential[[id number]]        Abnormal            Final result                 Please view results for these tests on the individual orders.    CBC Auto Differential [[id number]]  (Abnormal) Collected:  11/24/19 1334    Specimen:  Blood Updated:  11/24/19 1342     WBC 12.40 10*3/mm3      RBC 5.45 10*6/mm3      Hemoglobin 14.9 g/dL      Hematocrit 44.4 %      MCV 81.5 fL      MCH 27.3 pg      MCHC 33.6 g/dL      RDW 13.8 %      RDW-SD 40.1 fl      MPV 10.6 fL      Platelets 229 10*3/mm3      Neutrophil % 66.8 %      Lymphocyte % 22.0 %      Monocyte % 8.1 %      Eosinophil % 1.8 %      Basophil % 0.6 %      Immature Grans % 0.7 %      Neutrophils, Absolute 8.27 10*3/mm3      Lymphocytes, Absolute 2.73 10*3/mm3      Monocytes, Absolute 1.01 10*3/mm3      Eosinophils, Absolute 0.22 10*3/mm3      Basophils, Absolute 0.08 10*3/mm3      Immature Grans, Absolute 0.09 10*3/mm3      nRBC 0.0 /100 WBC         [id number]  MIKE Darby        Electronically signed by Stephen Weaver MD at 11/25/19 0992     Stephen Weaver MD at 11/24/19 1635          NEUROSURGERY INITIAL HOSPITAL ENCOUNTER    Assessment/Plan:   Goryd Erwin is a 54 y.o. male with a significant comorbidity of obesity and diabetes.  He presents with a new problem of acute onset of back pain after fall without radiation. Physical exam findings of neurologically intact with tenderness to palpation over thoracal lumbar junction.  Their imaging shows 10% compression fracture of L1 vertebral body.    Differential Diagnosis:   Acute L1 compression fracture    Recommendations:  I discussed the radiographic imaging and prognosis with Gordy and his wife.  He has an acute traumatic L1 compression fracture of approximately 10% height loss.  There is no involvement of the posterior  elements.  Given that he is not elderly and does not have osteoporosis, nor any risk factors for osteoporosis, he is not a candidate for kyphoplasty.  He is also a non-smoker.  Therefore his fracture should heal on his brace.  This should take approximately 3 months.  Would recommend aggressive pain management.  The patient is allergic to Toradol.  Therefore would recommend Tylenol, narcotics, lidocaine patches, gabapentin, and Valium as needed.  Typically these fractures will require 2 to 3 days of bedrest followed by slow progressive ambulation.  2 weeks off work as appropriate.  His fracture is intrinsically stable.  Would recommend TLSO brace for comfort but is not needed for stability.  Please notify neurosurgery should the patient develop new weakness, bowel or bladder dysfunction, or difficulty with gait.    I discussed the patients findings and my recommendations with patient, family and consulting provider    Thank you very much for this interesting consult.     Level of Risk: High due to:  illness with threat to life or bodily function  MDM: Moderate Complexity  Mod = 01580, High=99223  ___________________________________________________________________    Reason for consult  Fall from standing height    Chief Complaint:   Chief Complaint   Patient presents with   • Back Pain       HPI: Gordy Erwin is a 54 y.o. male with a significant comorbidity diabetes, morbid obesity.  Of note his wife recently had a two-level fusion by Dr. Kaplan.  The patient was in her normal state of health until earlier today when he slipped going down a wheelchair ramp at his house.  His feet came out from underneath him and he landed on his buttocks.  He had acute onset of back pain.  He went to an outside hospital where x-rays were performed and read as negative.  They were not pleased with the care there and were called back for CT scan and therefore sought care at [facility] emergency room.    On admission here he  was complaining of 10 out of 10 pain.  This was localized to his back.  This worsened with twisting or sitting up.  There is no radiation into his legs.  He has no numbness or weakness.  He did have difficulty getting in a car with the assistance of the spells.  There is no bowel or bladder incontinence.    Noncontrast CT scan revealed a 10% compression fracture of his lumbar spine.    Review of Systems   Constitutional: Negative.    HENT: Negative.    Eyes: Negative.    Respiratory: Negative.    Cardiovascular: Negative.    Gastrointestinal: Negative.    Endocrine: Negative.    Genitourinary: Negative.    Musculoskeletal: Positive for back pain and gait problem.   Skin: Negative.    Allergic/Immunologic: Negative.    Hematological: Negative.    Psychiatric/Behavioral: Negative.         Past Medical History:  has a past medical history of Diabetes mellitus (CMS/[facility]).    Past Surgical History:  has a past surgical history that includes Foot surgery (Right).    Family History: family history is not on file.    Social History:  reports that he quit smoking about 7 years ago. He has never used smokeless tobacco. He reports that he does not drink alcohol or use drugs.    Allergies: Penicillins; Contrast dye; Sulfa antibiotics; and Toradol [ketorolac tromethamine]    Home Medications:   Current Facility-Administered Medications:   •  [COMPLETED] Insert peripheral IV, , , Once **AND** sodium chloride 0.9 % flush 10 mL, 10 mL, Intravenous, PRN, Benoit Ibrahim, DO    Current Outpatient Medications:   •  diclofenac (VOLTAREN) 1 % gel gel, Apply 4 g topically to the appropriate area as directed 4 (Four) Times a Day As Needed., Disp: , Rfl:   •  meloxicam (MOBIC) 15 MG tablet, Take 15 mg by mouth., Disp: , Rfl:   •  diclofenac (VOLTAREN) 75 MG EC tablet, Take 1 tablet by mouth 2 (Two) Times a Day., Disp: 14 tablet, Rfl: 0  •  metFORMIN (GLUCOPHAGE) 500 MG tablet, Take 500 mg by mouth Daily With Breakfast., Disp: , Rfl:   •   sildenafil (VIAGRA) 100 MG tablet, Take 100 mg by mouth., Disp: , Rfl:     Medications: Scheduled Meds:   Continuous Infusions:   PRN Meds:.•  [COMPLETED] Insert peripheral IV **AND** sodium chloride    Vital Signs  Temp:  [98.3 °F (36.8 °C)-98.7 °F (37.1 °C)] 98.7 °F (37.1 °C)  Heart Rate:  [54-62] 61  Resp:  [13-15] 13  BP: (137-154)/(64-83) 143/69    Physical Exam  Physical Exam   Constitutional: He is oriented to person, place, and time. He appears well-developed and well-nourished.   HENT:   Head: Normocephalic and atraumatic.   Eyes: Conjunctivae and EOM are normal. Pupils are equal, round, and reactive to light.   Fundoscopic exam:       The right eye shows no exudate, no hemorrhage and no papilledema.        The left eye shows no exudate, no hemorrhage and no papilledema.   Neck: Normal range of motion. Neck supple.   Cardiovascular:   Pulses:       Dorsalis pedis pulses are 2+ on the right side, and 2+ on the left side.        Posterior tibial pulses are 2+ on the right side, and 2+ on the left side.   Pulmonary/Chest: Effort normal. No respiratory distress.     Vascular Status -  His right foot exhibits no edema. His left foot exhibits no edema.  Neurological: He is oriented to person, place, and time. He has a normal Finger-Nose-Finger Test, a normal Heel to Benjamin Test and a normal Tandem Gait Test. Gait normal.   Skin: Skin is warm. No rash noted. No erythema.   Psychiatric: His speech is normal.       Neurologic Exam     Mental Status   Oriented to person, place, and time.   Registration: recalls 3 of 3 objects. Recall at 5 minutes: recalls 3 of 3 objects.   Attention: normal. Concentration: normal.   Speech: speech is normal   Knowledge: consistent with education.     Cranial Nerves     CN II   Visual acuity: normal    CN III, IV, VI   Pupils are equal, round, and reactive to light.  Extraocular motions are normal.   Diplopia: none    CN V   Facial sensation intact.   Right corneal reflex: normal  Left  corneal reflex: normal    CN VII   Right facial weakness: none  Left facial weakness: none    CN VIII   Hearing: intact    CN IX, X   Palate: symmetric  Right gag reflex: normal  Left gag reflex: normal    CN XI   Right trapezius strength: normal  Left trapezius strength: normal    CN XII   Tongue deviation: none    Motor Exam   Right arm tone: normal  Left arm tone: normal  Right arm pronator drift: absent  Left arm pronator drift: absent  Right leg tone: normal  Left leg tone: normal    Strength   Strength 5/5 except as noted.     Sensory Exam   Light touch normal.   Proprioception normal.     Gait, Coordination, and Reflexes     Gait  Gait: normal    Coordination   Finger to nose coordination: normal  Heel to shin coordination: normal  Tandem walking coordination: normal    Reflexes   Reflexes 2+ except as noted.   Right plantar: normal  Left plantar: normal  Right Reyes: absent  Left Reyes: absent    Tenderness to palpation over thoracolumbar junction.    Results Review:   Independent review and interpretation of imaging  Imaging Results (Last 24 Hours)     Procedure Component Value Units Date/Time    CT Lumbar Spine Without Contrast [[id number]] Collected:  11/24/19 1437     Updated:  11/24/19 1443    Narrative:       CT LUMBAR SPINE WO CONTRAST- 11/24/2019 2:27 PM CST     HISTORY: acute fall. Possible fracture of L1 on other x-rays at another  hospital.     COMPARISON: None      DOSE LENGTH PRODUCT: 1259 mGy cm. Automated exposure control was also  utilized to decrease patient radiation dose.     TECHNIQUE: Helical tomographic images of the lumbar spine were obtained  without the use of contrast. Multiplanar reformatted images were  provided for review.      FINDINGS:   Alignment: Normal.     Bones: There is an acute fracture through the superior endplate of L1.  Minimal height loss is noted. The fracture extends into the LEFT  posterior aspect of the vertebral body but does not extend into the  pedicle. No  other fractures are identified.     Disc spaces: Mild disc space narrowing is present at L1-L2 and L2-L3.  There are small disc bulges at L4-L5 and L5-S1.     Canal and neuroforamina: Moderate facet hypertrophy is present at L4-L5,  resulting in moderate bony narrowing of the neuroforamina. There is no  bony narrowing of the spinal canal.     Soft tissues: Unremarkable.       Impression:       1. Acute, comminuted compression fracture through the superior endplate  of L1 that extends into the posterior aspect of the vertebral body but  does not extend into the pedicles. Approximately 10% vertebral body  height loss.  2. Mild to moderate degenerative changes.        This report was finalized on 11/24/2019 14:40 by Dr. Les Young MD.        MRI brain:  MRI spine:   CT Head:  CT c-spine:  CT t-spine:  CT l-spine:  X-ray:    I reviewed the patient's new clinical results.  Lab Results (last 24 hours)     Procedure Component Value Units Date/Time    [place] Draw [[id number]] Collected:  11/24/19 1334    Specimen:  Blood Updated:  11/24/19 1446    Narrative:       The following orders were created for panel order [place] Draw.  Procedure                               Abnormality         Status                     ---------                               -----------         ------                     Light Blue Top[[id number]]                                   Final result               Green Top (Gel)[[id number]]                                  Final result               Lavender Top[[id number]]                                     Final result               Red Top[[id number]]                                          Final result                 Please view results for these tests on the individual orders.    Light Blue Top [[id number]] Collected:  11/24/19 1334    Specimen:  Blood Updated:  11/24/19 1446     Extra Tube hold for add-on     Comment: Auto resulted       Green Top (Gel) [[id number]] Collected:  11/24/19 1334     Specimen:  Blood Updated:  11/24/19 1446     Extra Tube Hold for add-ons.     Comment: Auto resulted.       Lavender Top [[id number]] Collected:  11/24/19 1334    Specimen:  Blood Updated:  11/24/19 1446     Extra Tube hold for add-on     Comment: Auto resulted       Red Top [[id number]] Collected:  11/24/19 1334    Specimen:  Blood Updated:  11/24/19 1446     Extra Tube Hold for add-ons.     Comment: Auto resulted.       Comprehensive Metabolic Panel [[id number]]  (Abnormal) Collected:  11/24/19 1334    Specimen:  Blood Updated:  11/24/19 1409     Glucose 145 mg/dL      BUN 10 mg/dL      Creatinine 0.79 mg/dL      Sodium 137 mmol/L      Potassium 4.4 mmol/L      Chloride 100 mmol/L      CO2 27.0 mmol/L      Calcium 9.7 mg/dL      Total Protein 6.3 g/dL      Albumin 4.10 g/dL      ALT (SGPT) 48 U/L      AST (SGOT) 35 U/L      Comment: Specimen hemolyzed.  Results may be affected.        Alkaline Phosphatase 56 U/L      Total Bilirubin 0.3 mg/dL      eGFR Non African Amer 103 mL/min/1.73      Globulin 2.2 gm/dL      A/G Ratio 1.9 g/dL      BUN/Creatinine Ratio 12.7     Anion Gap 10.0 mmol/L     Narrative:       GFR Normal >60  Chronic Kidney Disease <60  Kidney Failure <15    Protime-INR [[id number]]  (Normal) Collected:  11/24/19 1334    Specimen:  Blood Updated:  11/24/19 1350     Protime 13.6 Seconds      INR 1.01    aPTT [[id number]]  (Normal) Collected:  11/24/19 1334    Specimen:  Blood Updated:  11/24/19 1350     PTT 27.7 seconds     CBC & Differential [[id number]] Collected:  11/24/19 1334    Specimen:  Blood Updated:  11/24/19 1342    Narrative:       The following orders were created for panel order CBC & Differential.  Procedure                               Abnormality         Status                     ---------                               -----------         ------                     CBC Auto Differential[[id number]]        Abnormal            Final result                 Please view results for these tests  on the individual orders.    CBC Auto Differential [[id number]]  (Abnormal) Collected:  11/24/19 1334    Specimen:  Blood Updated:  11/24/19 1342     WBC 12.40 10*3/mm3      RBC 5.45 10*6/mm3      Hemoglobin 14.9 g/dL      Hematocrit 44.4 %      MCV 81.5 fL      MCH 27.3 pg      MCHC 33.6 g/dL      RDW 13.8 %      RDW-SD 40.1 fl      MPV 10.6 fL      Platelets 229 10*3/mm3      Neutrophil % 66.8 %      Lymphocyte % 22.0 %      Monocyte % 8.1 %      Eosinophil % 1.8 %      Basophil % 0.6 %      Immature Grans % 0.7 %      Neutrophils, Absolute 8.27 10*3/mm3      Lymphocytes, Absolute 2.73 10*3/mm3      Monocytes, Absolute 1.01 10*3/mm3      Eosinophils, Absolute 0.22 10*3/mm3      Basophils, Absolute 0.08 10*3/mm3      Immature Grans, Absolute 0.09 10*3/mm3      nRBC 0.0 /100 WBC           Stephen Weaver MD          Electronically signed by Stephen Weaver MD at 11/24/19 1642     Stephen Weaver MD at 11/24/19 1510        Contacted from the emergency room.  Patient is a morbidly obese 54-year-old male who sustained a fall at home.  He had acute onset of back pain without radiation, numbness, weakness.  He has pain that is worsening with standing and relieved with laying down.    His imaging is reviewed remotely which shows an L1 10% compression fracture without evidence of instability.    Recommend maximal analgesics  TLSO  Rest and patient may follow-up with me in clinic next week  Given that this is a traumatic fracture in a non-osteoporotic patient kyphoplasty is not indicated.    Stephen Weaver MD      Electronically signed by Stephen Weaver MD at 11/24/19 1511       Consult Notes (all)     No notes of this type exist for this encounter.           Discharge Summary      Robert Frazier DO at 11/25/19 1519              [facility] Medicine Services  DISCHARGE SUMMARY       Date of Admission: 11/24/2019  Date of Discharge:   11/25/2019  Primary Care Physician: Hemal Gutierrez MD    Discharge Diagnoses:  Patient Active Problem List   Diagnosis   • Compression fracture of L1 lumbar vertebra (CMS/[facility])   • Diabetes mellitus (CMS/[facility])   • Morbid obesity (CMS/[facility])   • Intractable back pain         Presenting Problem/History of Present Illness:  Compression fracture of L1 vertebra, initial encounter (CMS/HCC) [S32.010A]     Chief Complaint on Day of Discharge:   Back pain    History of Present Illness on Day of Discharge:   The patient is feeling better today.  He states that his pain is at a 4/10 level with pain medications.  Pain increases to an 8/10 without pain medication.  He complains of some muscle spasm and will be given a prescription for a muscle relaxant at the time of discharge.  MRI scan of the lumbar spine is been performed showing a compression fracture of L1 at 10% but no free fragments or retropulsion of fragments was noted and no spinal instability was noted.  The patient is appropriate for discharge home and will follow up with his family physician in 1 week and with Dr. Weaver's office in 3 weeks.    Hospital Course  This 54-year-old white male was admitted with a chief complaint of severe low back discomfort after a fall he incurred at home this morning at about 5:30.  The patient awakened this morning early in order to let his dog out and slipped on an icy wooden ramp at his home at about 5:30 this morning landing in a sitting position on his buttocks.  He encountered immediate and severe, debilitating back discomfort and subsequently went to the [facility] emergency department where x-rays were taken and he was treated and discharged with oral pain medication.  The [facility] emergency department called him back on the same day recommending he return for a CT scan for a possible fracture that was noted by the radiologist on plain film x-rays.  The patient came to our facility instead, for further evaluation.   The patient feels much better when he is lying flat on his back but has very significant and very limiting pain when sitting up or standing.  His pain has not been controlled without IV pain medication to this point.  Neurosurgery was consulted in the emergency department and evaluated the patient's CT films and saw the patient in the emergency department.  Recommendations were for maximal analgesics, TLSO, rest and follow-up with Dr. Weaver in his clinic next week.  Kyphoplasty was not indicated given the traumatic nature of the fracture in a non-osteoporotic patient.     CMP was obtained in the ER and was unremarkable.  White blood cell count is reactively elevated at 12,400.  INR and PTT within normal limits.  PLAN:   Admit to observation  Lidoderm topical patch to lumbar region  Oral pain medications  Ambulate with assistance  Probable discharge tomorrow when pain control is achieved  Neurosurgery to follow-up while inpatient    The patient is feeling better today.  He states that his pain is at a 4/10 level with pain medications.  Pain increases to an 8/10 without pain medication.  He complains of some muscle spasm and will be given a prescription for a muscle relaxant at the time of discharge.  MRI scan of the lumbar spine is been performed showing a compression fracture of L1 at 10% but no free fragments or retropulsion of fragments was noted and no spinal instability was noted.  The patient is appropriate for discharge home and will follow up with his family physician in 1 week and with Dr. Weaver's office in 3 weeks.    Consults:   Neurosurgery:  Contacted from the emergency room.  Patient is a morbidly obese 54-year-old male who sustained a fall at home.  He had acute onset of back pain without radiation, numbness, weakness.  He has pain that is worsening with standing and relieved with laying down.     His imaging is reviewed remotely which shows an L1 10% compression fracture without evidence of  instability.     Recommend maximal analgesics  TLSO  Rest and patient may follow-up with me in clinic next week  Given that this is a traumatic fracture in a non-osteoporotic patient kyphoplasty is not indicated.     Stephen Weaver MD    Pertinent Test Results:   Lab Results (last 7 days)     Procedure Component Value Units Date/Time    [place] Draw [[id number]] Collected:  11/24/19 1334    Specimen:  Blood Updated:  11/24/19 1446    Narrative:       The following orders were created for panel order [place] Draw.  Procedure                               Abnormality         Status                     ---------                               -----------         ------                     Light Blue Top[[id number]]                                   Final result               Green Top (Gel)[[id number]]                                  Final result               Lavender Top[[id number]]                                     Final result               Red Top[[id number]]                                          Final result                 Please view results for these tests on the individual orders.    Light Blue Top [[id number]] Collected:  11/24/19 1334    Specimen:  Blood Updated:  11/24/19 1446     Extra Tube hold for add-on     Comment: Auto resulted       Green Top (Gel) [[id number]] Collected:  11/24/19 1334    Specimen:  Blood Updated:  11/24/19 1446     Extra Tube Hold for add-ons.     Comment: Auto resulted.       Lavender Top [[id number]] Collected:  11/24/19 1334    Specimen:  Blood Updated:  11/24/19 1446     Extra Tube hold for add-on     Comment: Auto resulted       Red Top [[id number]] Collected:  11/24/19 1334    Specimen:  Blood Updated:  11/24/19 1446     Extra Tube Hold for add-ons.     Comment: Auto resulted.       Comprehensive Metabolic Panel [[id number]]  (Abnormal) Collected:  11/24/19 1334    Specimen:  Blood Updated:  11/24/19 1409     Glucose 145 mg/dL      BUN 10 mg/dL      Creatinine 0.79 mg/dL       Sodium 137 mmol/L      Potassium 4.4 mmol/L      Chloride 100 mmol/L      CO2 27.0 mmol/L      Calcium 9.7 mg/dL      Total Protein 6.3 g/dL      Albumin 4.10 g/dL      ALT (SGPT) 48 U/L      AST (SGOT) 35 U/L      Comment: Specimen hemolyzed.  Results may be affected.        Alkaline Phosphatase 56 U/L      Total Bilirubin 0.3 mg/dL      eGFR Non African Amer 103 mL/min/1.73      Globulin 2.2 gm/dL      A/G Ratio 1.9 g/dL      BUN/Creatinine Ratio 12.7     Anion Gap 10.0 mmol/L     Narrative:       GFR Normal >60  Chronic Kidney Disease <60  Kidney Failure <15    Protime-INR [[id number]]  (Normal) Collected:  11/24/19 1334    Specimen:  Blood Updated:  11/24/19 1350     Protime 13.6 Seconds      INR 1.01    aPTT [[id number]]  (Normal) Collected:  11/24/19 1334    Specimen:  Blood Updated:  11/24/19 1350     PTT 27.7 seconds     CBC & Differential [[id number]] Collected:  11/24/19 1334    Specimen:  Blood Updated:  11/24/19 1342    Narrative:       The following orders were created for panel order CBC & Differential.  Procedure                               Abnormality         Status                     ---------                               -----------         ------                     CBC Auto Differential[[id number]]        Abnormal            Final result                 Please view results for these tests on the individual orders.    CBC Auto Differential [[id number]]  (Abnormal) Collected:  11/24/19 1334    Specimen:  Blood Updated:  11/24/19 1342     WBC 12.40 10*3/mm3      RBC 5.45 10*6/mm3      Hemoglobin 14.9 g/dL      Hematocrit 44.4 %      MCV 81.5 fL      MCH 27.3 pg      MCHC 33.6 g/dL      RDW 13.8 %      RDW-SD 40.1 fl      MPV 10.6 fL      Platelets 229 10*3/mm3      Neutrophil % 66.8 %      Lymphocyte % 22.0 %      Monocyte % 8.1 %      Eosinophil % 1.8 %      Basophil % 0.6 %      Immature Grans % 0.7 %      Neutrophils, Absolute 8.27 10*3/mm3      Lymphocytes, Absolute 2.73 10*3/mm3       Monocytes, Absolute 1.01 10*3/mm3      Eosinophils, Absolute 0.22 10*3/mm3      Basophils, Absolute 0.08 10*3/mm3      Immature Grans, Absolute 0.09 10*3/mm3      nRBC 0.0 /100 WBC         Imaging Results (Last 7 Days)     Procedure Component Value Units Date/Time    MRI Lumbar Spine Without Contrast [[id number]] Collected:  11/25/19 1452     Updated:  11/25/19 1505    Narrative:       EXAM: MR LUMBOSACRAL SPINE WITHOUT IV CONTRAST 11/25/2019     COMPARISON: L1 compression fracture      INDICATION: 54 years-old Male.  Further evaluation of L1 compression  fracture.; S32.010A-Wedge compression fracture of first lumbar vertebra,  initial encounter for closed fracture; R52-Pain, unspecified     TECHNIQUE:   Routine pulse sequences of the lumbar spine were obtained without IV  contrast.      FINDINGS:         There is acute fracture involving the upper endplate of the L1 vertebral  body, with both anterior and posterior cortical disruption of the  vertebral body. There is approximately 10 % height loss. There is no  retropulsion. No significant spinal canal stenosis. No epidural hematoma  The conus terminates at L1. No abnormal signal within the visualized  distal cord.     There is no significant spinal canal stenosis. No additional compression  deformity. No dislocation. No marrow replacing process.          Impression:       L1 acute 2 column fracture without retropulsion or significant spinal  canal stenosis.  This report was finalized on 11/25/2019 15:02 by Dr. Rosanna Aponte MD.    CT Lumbar Spine Without Contrast [[id number]] Collected:  11/24/19 1437     Updated:  11/24/19 1443    Narrative:       CT LUMBAR SPINE WO CONTRAST- 11/24/2019 2:27 PM CST     HISTORY: acute fall. Possible fracture of L1 on other x-rays at another  hospital.     COMPARISON: None      DOSE LENGTH PRODUCT: 1259 mGy cm. Automated exposure control was also  utilized to decrease patient radiation dose.     TECHNIQUE: Helical tomographic  "images of the lumbar spine were obtained  without the use of contrast. Multiplanar reformatted images were  provided for review.      FINDINGS:   Alignment: Normal.     Bones: There is an acute fracture through the superior endplate of L1.  Minimal height loss is noted. The fracture extends into the LEFT  posterior aspect of the vertebral body but does not extend into the  pedicle. No other fractures are identified.     Disc spaces: Mild disc space narrowing is present at L1-L2 and L2-L3.  There are small disc bulges at L4-L5 and L5-S1.     Canal and neuroforamina: Moderate facet hypertrophy is present at L4-L5,  resulting in moderate bony narrowing of the neuroforamina. There is no  bony narrowing of the spinal canal.     Soft tissues: Unremarkable.       Impression:       1. Acute, comminuted compression fracture through the superior endplate  of L1 that extends into the posterior aspect of the vertebral body but  does not extend into the pedicles. Approximately 10% vertebral body  height loss.  2. Mild to moderate degenerative changes.        This report was finalized on 11/24/2019 14:40 by Dr. Les Young MD.              Condition on Discharge:    Stable    Physical Exam on Discharge:  /77 (BP Location: Right arm, Patient Position: Lying)   Pulse 61   Temp 98 °F (36.7 °C) (Oral)   Resp 18   Ht 200.7 cm (79\")   Wt (!) 177 kg (391 lb)   SpO2 93%   BMI 44.05 kg/m²    Physical Exam  Constitutional: He is oriented to person, place, and time. He appears well-developed and well-nourished. He is cooperative. No distress.   HENT:   Head: Normocephalic and atraumatic.   Right Ear: External ear normal.   Left Ear: External ear normal.   Mouth/Throat: Oropharynx is clear and moist.   Eyes: Conjunctivae and EOM are normal. Pupils are equal, round, and reactive to light. No scleral icterus.   Neck: Normal range of motion. Neck supple. No JVD present.   Cardiovascular: Normal rate, regular rhythm, normal heart " sounds and intact distal pulses.   No murmur heard.  Pulmonary/Chest: Effort normal and breath sounds normal. No respiratory distress.   Abdominal: Soft. Bowel sounds are normal. He exhibits no mass. There is no tenderness.   Musculoskeletal: He exhibits tenderness ( Upper lumbar region). He exhibits no edema or deformity.        Lumbar back: He exhibits decreased range of motion, tenderness, bony tenderness, pain and spasm.   Neurological: He is alert and oriented to person, place, and time. He displays normal reflexes. No cranial nerve deficit. Coordination normal.   Skin: Skin is warm and dry. No erythema.   Psychiatric: He has a normal mood and affect. His behavior is normal.     Discharge Disposition:  Home or Self Care    Discharge Medications:     Discharge Medications      New Medications      Instructions Start Date   lidocaine 5 %  Commonly known as:  LIDODERM   1 patch, Transdermal, Every 24 Hours, Remove & Discard patch within 12 hours or as directed by MD      metaxalone 800 MG tablet  Commonly known as:  SKELAXIN   800 mg, Oral, 3 Times Daily PRN      oxyCODONE-acetaminophen 7.5-325 MG per tablet  Commonly known as:  PERCOCET   1 to 2 tablets every 4 hours as needed for pain         Continue These Medications      Instructions Start Date   diclofenac 1 % gel gel  Commonly known as:  VOLTAREN   4 g, Topical, 4 Times Daily PRN      meloxicam 15 MG tablet  Commonly known as:  MOBIC   15 mg, Oral, Daily      metFORMIN 500 MG tablet  Commonly known as:  GLUCOPHAGE   500 mg, Oral, Daily With Breakfast      sildenafil 100 MG tablet  Commonly known as:  VIAGRA   100 mg, Oral, Daily PRN         Stop These Medications    HYDROcodone-acetaminophen 5-325 MG per tablet  Commonly known as:  NORCO            Discharge Diet:   Diet Instructions     Diet: Regular; Thin      Discharge Diet:  Regular    Fluid Consistency:  Thin          Discharge Care Plan / Instructions:   Discharge home    Activity at Discharge:    Activity Instructions     Activity as Tolerated            Follow-up Appointments:  Follow-up with Dr. Gutierrez in 1 week  Follow-up with Dr. Weaver in 3 weeks       Robert Frazier DO  11/25/19  3:19 PM    Time: Discharge Less than 30 min    Please note that portions of this note may have been completed with a voice recognition program. Efforts were made to edit the dictations, but occasionally words are mistranscribed.            Electronically signed by Robert Frazier DO at 11/25/19 8508

## 2019-11-27 DIAGNOSIS — S32.010A COMPRESSION FRACTURE OF L1 VERTEBRA, INITIAL ENCOUNTER (HCC): Primary | ICD-10-CM

## 2019-11-27 RX ORDER — OXYCODONE AND ACETAMINOPHEN 7.5; 325 MG/1; MG/1
1 TABLET ORAL EVERY 6 HOURS PRN
Qty: 60 TABLET | Refills: 0 | Status: SHIPPED | OUTPATIENT
Start: 2019-11-27 | End: 2019-12-12

## 2019-12-16 ENCOUNTER — HOSPITAL ENCOUNTER (OUTPATIENT)
Dept: GENERAL RADIOLOGY | Facility: HOSPITAL | Age: 54
Discharge: HOME | End: 2019-12-16
Admitting: NURSE PRACTITIONER

## 2019-12-16 ENCOUNTER — OFFICE VISIT (OUTPATIENT)
Dept: NEUROSURGERY | Facility: CLINIC | Age: 54
End: 2019-12-16

## 2019-12-16 VITALS
DIASTOLIC BLOOD PRESSURE: 100 MMHG | WEIGHT: 315 LBS | BODY MASS INDEX: 36.45 KG/M2 | HEIGHT: 78 IN | SYSTOLIC BLOOD PRESSURE: 140 MMHG

## 2019-12-16 DIAGNOSIS — S32.010A COMPRESSION FRACTURE OF L1 VERTEBRA, INITIAL ENCOUNTER (HCC): Primary | ICD-10-CM

## 2019-12-16 DIAGNOSIS — S32.010A COMPRESSION FRACTURE OF L1 VERTEBRA, INITIAL ENCOUNTER (HCC): ICD-10-CM

## 2019-12-16 DIAGNOSIS — E66.01 MORBID OBESITY (HCC): ICD-10-CM

## 2019-12-16 PROCEDURE — 99214 OFFICE O/P EST MOD 30 MIN: CPT | Performed by: NURSE PRACTITIONER

## 2019-12-16 PROCEDURE — 72100 X-RAY EXAM L-S SPINE 2/3 VWS: CPT

## 2019-12-16 RX ORDER — OXYCODONE AND ACETAMINOPHEN 7.5; 325 MG/1; MG/1
1 TABLET ORAL EVERY 6 HOURS PRN
COMMUNITY
End: 2021-11-12

## 2019-12-16 RX ORDER — OXYCODONE AND ACETAMINOPHEN 10; 325 MG/1; MG/1
TABLET ORAL
Refills: 0 | COMMUNITY
Start: 2019-11-24 | End: 2021-11-12

## 2019-12-16 NOTE — PATIENT INSTRUCTIONS
"DASH Eating Plan  DASH stands for \"Dietary Approaches to Stop Hypertension.\" The DASH eating plan is a healthy eating plan that has been shown to reduce high blood pressure (hypertension). It may also reduce your risk for type 2 diabetes, heart disease, and stroke. The DASH eating plan may also help with weight loss.  What are tips for following this plan?    General guidelines  · Avoid eating more than 2,300 mg (milligrams) of salt (sodium) a day. If you have hypertension, you may need to reduce your sodium intake to 1,500 mg a day.  · Limit alcohol intake to no more than 1 drink a day for nonpregnant women and 2 drinks a day for men. One drink equals 12 oz of beer, 5 oz of wine, or 1½ oz of hard liquor.  · Work with your health care provider to maintain a healthy body weight or to lose weight. Ask what an ideal weight is for you.  · Get at least 30 minutes of exercise that causes your heart to beat faster (aerobic exercise) most days of the week. Activities may include walking, swimming, or biking.  · Work with your health care provider or diet and nutrition specialist (dietitian) to adjust your eating plan to your individual calorie needs.  Reading food labels    · Check food labels for the amount of sodium per serving. Choose foods with less than 5 percent of the Daily Value of sodium. Generally, foods with less than 300 mg of sodium per serving fit into this eating plan.  · To find whole grains, look for the word \"whole\" as the first word in the ingredient list.  Shopping  · Buy products labeled as \"low-sodium\" or \"no salt added.\"  · Buy fresh foods. Avoid canned foods and premade or frozen meals.  Cooking  · Avoid adding salt when cooking. Use salt-free seasonings or herbs instead of table salt or sea salt. Check with your health care provider or pharmacist before using salt substitutes.  · Do not venegas foods. Cook foods using healthy methods such as baking, boiling, grilling, and broiling instead.  · Cook with " heart-healthy oils, such as olive, canola, soybean, or sunflower oil.  Meal planning  · Eat a balanced diet that includes:  ? 5 or more servings of fruits and vegetables each day. At each meal, try to fill half of your plate with fruits and vegetables.  ? Up to 6-8 servings of whole grains each day.  ? Less than 6 oz of lean meat, poultry, or fish each day. A 3-oz serving of meat is about the same size as a deck of cards. One egg equals 1 oz.  ? 2 servings of low-fat dairy each day.  ? A serving of nuts, seeds, or beans 5 times each week.  ? Heart-healthy fats. Healthy fats called Omega-3 fatty acids are found in foods such as flaxseeds and coldwater fish, like sardines, salmon, and mackerel.  · Limit how much you eat of the following:  ? Canned or prepackaged foods.  ? Food that is high in trans fat, such as fried foods.  ? Food that is high in saturated fat, such as fatty meat.  ? Sweets, desserts, sugary drinks, and other foods with added sugar.  ? Full-fat dairy products.  · Do not salt foods before eating.  · Try to eat at least 2 vegetarian meals each week.  · Eat more home-cooked food and less restaurant, buffet, and fast food.  · When eating at a restaurant, ask that your food be prepared with less salt or no salt, if possible.  What foods are recommended?  The items listed may not be a complete list. Talk with your dietitian about what dietary choices are best for you.  Grains  Whole-grain or whole-wheat bread. Whole-grain or whole-wheat pasta. Brown rice. Oatmeal. Quinoa. Bulgur. Whole-grain and low-sodium cereals. Zeny bread. Low-fat, low-sodium crackers. Whole-wheat flour tortillas.  Vegetables  Fresh or frozen vegetables (raw, steamed, roasted, or grilled). Low-sodium or reduced-sodium tomato and vegetable juice. Low-sodium or reduced-sodium tomato sauce and tomato paste. Low-sodium or reduced-sodium canned vegetables.  Fruits  All fresh, dried, or frozen fruit. Canned fruit in natural juice (without  added sugar).  Meat and other protein foods  Skinless chicken or turkey. Ground chicken or turkey. Pork with fat trimmed off. Fish and seafood. Egg whites. Dried beans, peas, or lentils. Unsalted nuts, nut butters, and seeds. Unsalted canned beans. Lean cuts of beef with fat trimmed off. Low-sodium, lean deli meat.  Dairy  Low-fat (1%) or fat-free (skim) milk. Fat-free, low-fat, or reduced-fat cheeses. Nonfat, low-sodium ricotta or cottage cheese. Low-fat or nonfat yogurt. Low-fat, low-sodium cheese.  Fats and oils  Soft margarine without trans fats. Vegetable oil. Low-fat, reduced-fat, or light mayonnaise and salad dressings (reduced-sodium). Canola, safflower, olive, soybean, and sunflower oils. Avocado.  Seasoning and other foods  Herbs. Spices. Seasoning mixes without salt. Unsalted popcorn and pretzels. Fat-free sweets.  What foods are not recommended?  The items listed may not be a complete list. Talk with your dietitian about what dietary choices are best for you.  Grains  Baked goods made with fat, such as croissants, muffins, or some breads. Dry pasta or rice meal packs.  Vegetables  Creamed or fried vegetables. Vegetables in a cheese sauce. Regular canned vegetables (not low-sodium or reduced-sodium). Regular canned tomato sauce and paste (not low-sodium or reduced-sodium). Regular tomato and vegetable juice (not low-sodium or reduced-sodium). Pickles. Olives.  Fruits  Canned fruit in a light or heavy syrup. Fried fruit. Fruit in cream or butter sauce.  Meat and other protein foods  Fatty cuts of meat. Ribs. Fried meat. Flores. Sausage. Bologna and other processed lunch meats. Salami. Fatback. Hotdogs. Bratwurst. Salted nuts and seeds. Canned beans with added salt. Canned or smoked fish. Whole eggs or egg yolks. Chicken or turkey with skin.  Dairy  Whole or 2% milk, cream, and half-and-half. Whole or full-fat cream cheese. Whole-fat or sweetened yogurt. Full-fat cheese. Nondairy creamers. Whipped toppings.  Processed cheese and cheese spreads.  Fats and oils  Butter. Stick margarine. Lard. Shortening. Ghee. Flores fat. Tropical oils, such as coconut, palm kernel, or palm oil.  Seasoning and other foods  Salted popcorn and pretzels. Onion salt, garlic salt, seasoned salt, table salt, and sea salt. Worcestershire sauce. Tartar sauce. Barbecue sauce. Teriyaki sauce. Soy sauce, including reduced-sodium. Steak sauce. Canned and packaged gravies. Fish sauce. Oyster sauce. Cocktail sauce. Horseradish that you find on the shelf. Ketchup. Mustard. Meat flavorings and tenderizers. Bouillon cubes. Hot sauce and Tabasco sauce. Premade or packaged marinades. Premade or packaged taco seasonings. Relishes. Regular salad dressings.  Where to find more information:  · National Heart, Lung, and Blood [place]: www.nhlbi.nih.gov  · American Heart Association: www.heart.org  Summary  · The DASH eating plan is a healthy eating plan that has been shown to reduce high blood pressure (hypertension). It may also reduce your risk for type 2 diabetes, heart disease, and stroke.  · With the DASH eating plan, you should limit salt (sodium) intake to 2,300 mg a day. If you have hypertension, you may need to reduce your sodium intake to 1,500 mg a day.  · When on the DASH eating plan, aim to eat more fresh fruits and vegetables, whole grains, lean proteins, low-fat dairy, and heart-healthy fats.  · Work with your health care provider or diet and nutrition specialist (dietitian) to adjust your eating plan to your individual calorie needs.  This information is not intended to replace advice given to you by your health care provider. Make sure you discuss any questions you have with your health care provider.  Document Released: 12/06/2012 Document Revised: 12/11/2017 Document Reviewed: 12/11/2017  [employer] Patient Education © 2019 [employer]
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
## 2020-01-16 ENCOUNTER — OFFICE VISIT (OUTPATIENT)
Dept: NEUROSURGERY | Facility: CLINIC | Age: 55
End: 2020-01-16

## 2020-01-16 ENCOUNTER — HOSPITAL ENCOUNTER (OUTPATIENT)
Dept: GENERAL RADIOLOGY | Facility: HOSPITAL | Age: 55
Discharge: HOME | End: 2020-01-16
Admitting: NURSE PRACTITIONER

## 2020-01-16 VITALS — BODY MASS INDEX: 36.45 KG/M2 | HEIGHT: 78 IN | WEIGHT: 315 LBS

## 2020-01-16 DIAGNOSIS — S32.010A COMPRESSION FRACTURE OF L1 VERTEBRA, INITIAL ENCOUNTER (HCC): Primary | ICD-10-CM

## 2020-01-16 DIAGNOSIS — E66.01 MORBID OBESITY (HCC): ICD-10-CM

## 2020-01-16 DIAGNOSIS — S32.010A COMPRESSION FRACTURE OF L1 VERTEBRA, INITIAL ENCOUNTER (HCC): ICD-10-CM

## 2020-01-16 PROCEDURE — 72100 X-RAY EXAM L-S SPINE 2/3 VWS: CPT

## 2020-01-16 PROCEDURE — 99214 OFFICE O/P EST MOD 30 MIN: CPT | Performed by: NURSE PRACTITIONER

## 2020-01-16 RX ORDER — OXYCODONE AND ACETAMINOPHEN 10; 325 MG/1; MG/1
1 TABLET ORAL EVERY 6 HOURS PRN
Qty: 12 TABLET | Refills: 0 | Status: SHIPPED | OUTPATIENT
Start: 2020-01-16 | End: 2021-11-12
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
## 2020-01-16 NOTE — PATIENT INSTRUCTIONS
"DASH Eating Plan  DASH stands for \"Dietary Approaches to Stop Hypertension.\" The DASH eating plan is a healthy eating plan that has been shown to reduce high blood pressure (hypertension). It may also reduce your risk for type 2 diabetes, heart disease, and stroke. The DASH eating plan may also help with weight loss.  What are tips for following this plan?    General guidelines  · Avoid eating more than 2,300 mg (milligrams) of salt (sodium) a day. If you have hypertension, you may need to reduce your sodium intake to 1,500 mg a day.  · Limit alcohol intake to no more than 1 drink a day for nonpregnant women and 2 drinks a day for men. One drink equals 12 oz of beer, 5 oz of wine, or 1½ oz of hard liquor.  · Work with your health care provider to maintain a healthy body weight or to lose weight. Ask what an ideal weight is for you.  · Get at least 30 minutes of exercise that causes your heart to beat faster (aerobic exercise) most days of the week. Activities may include walking, swimming, or biking.  · Work with your health care provider or diet and nutrition specialist (dietitian) to adjust your eating plan to your individual calorie needs.  Reading food labels    · Check food labels for the amount of sodium per serving. Choose foods with less than 5 percent of the Daily Value of sodium. Generally, foods with less than 300 mg of sodium per serving fit into this eating plan.  · To find whole grains, look for the word \"whole\" as the first word in the ingredient list.  Shopping  · Buy products labeled as \"low-sodium\" or \"no salt added.\"  · Buy fresh foods. Avoid canned foods and premade or frozen meals.  Cooking  · Avoid adding salt when cooking. Use salt-free seasonings or herbs instead of table salt or sea salt. Check with your health care provider or pharmacist before using salt substitutes.  · Do not venegas foods. Cook foods using healthy methods such as baking, boiling, grilling, and broiling instead.  · Cook with " heart-healthy oils, such as olive, canola, soybean, or sunflower oil.  Meal planning  · Eat a balanced diet that includes:  ? 5 or more servings of fruits and vegetables each day. At each meal, try to fill half of your plate with fruits and vegetables.  ? Up to 6-8 servings of whole grains each day.  ? Less than 6 oz of lean meat, poultry, or fish each day. A 3-oz serving of meat is about the same size as a deck of cards. One egg equals 1 oz.  ? 2 servings of low-fat dairy each day.  ? A serving of nuts, seeds, or beans 5 times each week.  ? Heart-healthy fats. Healthy fats called Omega-3 fatty acids are found in foods such as flaxseeds and coldwater fish, like sardines, salmon, and mackerel.  · Limit how much you eat of the following:  ? Canned or prepackaged foods.  ? Food that is high in trans fat, such as fried foods.  ? Food that is high in saturated fat, such as fatty meat.  ? Sweets, desserts, sugary drinks, and other foods with added sugar.  ? Full-fat dairy products.  · Do not salt foods before eating.  · Try to eat at least 2 vegetarian meals each week.  · Eat more home-cooked food and less restaurant, buffet, and fast food.  · When eating at a restaurant, ask that your food be prepared with less salt or no salt, if possible.  What foods are recommended?  The items listed may not be a complete list. Talk with your dietitian about what dietary choices are best for you.  Grains  Whole-grain or whole-wheat bread. Whole-grain or whole-wheat pasta. Brown rice. Oatmeal. Quinoa. Bulgur. Whole-grain and low-sodium cereals. Zeny bread. Low-fat, low-sodium crackers. Whole-wheat flour tortillas.  Vegetables  Fresh or frozen vegetables (raw, steamed, roasted, or grilled). Low-sodium or reduced-sodium tomato and vegetable juice. Low-sodium or reduced-sodium tomato sauce and tomato paste. Low-sodium or reduced-sodium canned vegetables.  Fruits  All fresh, dried, or frozen fruit. Canned fruit in natural juice (without  added sugar).  Meat and other protein foods  Skinless chicken or turkey. Ground chicken or turkey. Pork with fat trimmed off. Fish and seafood. Egg whites. Dried beans, peas, or lentils. Unsalted nuts, nut butters, and seeds. Unsalted canned beans. Lean cuts of beef with fat trimmed off. Low-sodium, lean deli meat.  Dairy  Low-fat (1%) or fat-free (skim) milk. Fat-free, low-fat, or reduced-fat cheeses. Nonfat, low-sodium ricotta or cottage cheese. Low-fat or nonfat yogurt. Low-fat, low-sodium cheese.  Fats and oils  Soft margarine without trans fats. Vegetable oil. Low-fat, reduced-fat, or light mayonnaise and salad dressings (reduced-sodium). Canola, safflower, olive, soybean, and sunflower oils. Avocado.  Seasoning and other foods  Herbs. Spices. Seasoning mixes without salt. Unsalted popcorn and pretzels. Fat-free sweets.  What foods are not recommended?  The items listed may not be a complete list. Talk with your dietitian about what dietary choices are best for you.  Grains  Baked goods made with fat, such as croissants, muffins, or some breads. Dry pasta or rice meal packs.  Vegetables  Creamed or fried vegetables. Vegetables in a cheese sauce. Regular canned vegetables (not low-sodium or reduced-sodium). Regular canned tomato sauce and paste (not low-sodium or reduced-sodium). Regular tomato and vegetable juice (not low-sodium or reduced-sodium). Pickles. Olives.  Fruits  Canned fruit in a light or heavy syrup. Fried fruit. Fruit in cream or butter sauce.  Meat and other protein foods  Fatty cuts of meat. Ribs. Fried meat. Flores. Sausage. Bologna and other processed lunch meats. Salami. Fatback. Hotdogs. Bratwurst. Salted nuts and seeds. Canned beans with added salt. Canned or smoked fish. Whole eggs or egg yolks. Chicken or turkey with skin.  Dairy  Whole or 2% milk, cream, and half-and-half. Whole or full-fat cream cheese. Whole-fat or sweetened yogurt. Full-fat cheese. Nondairy creamers. Whipped toppings.  Processed cheese and cheese spreads.  Fats and oils  Butter. Stick margarine. Lard. Shortening. Ghee. Flores fat. Tropical oils, such as coconut, palm kernel, or palm oil.  Seasoning and other foods  Salted popcorn and pretzels. Onion salt, garlic salt, seasoned salt, table salt, and sea salt. Worcestershire sauce. Tartar sauce. Barbecue sauce. Teriyaki sauce. Soy sauce, including reduced-sodium. Steak sauce. Canned and packaged gravies. Fish sauce. Oyster sauce. Cocktail sauce. Horseradish that you find on the shelf. Ketchup. Mustard. Meat flavorings and tenderizers. Bouillon cubes. Hot sauce and Tabasco sauce. Premade or packaged marinades. Premade or packaged taco seasonings. Relishes. Regular salad dressings.  Where to find more information:  · National Heart, Lung, and [employer] [place]: www.nhlbi.nih.gov  · American Heart Association: www.heart.org  Summary  · The DASH eating plan is a healthy eating plan that has been shown to reduce high blood pressure (hypertension). It may also reduce your risk for type 2 diabetes, heart disease, and stroke.  · With the DASH eating plan, you should limit salt (sodium) intake to 2,300 mg a day. If you have hypertension, you may need to reduce your sodium intake to 1,500 mg a day.  · When on the DASH eating plan, aim to eat more fresh fruits and vegetables, whole grains, lean proteins, low-fat dairy, and heart-healthy fats.  · Work with your health care provider or diet and nutrition specialist (dietitian) to adjust your eating plan to your individual calorie needs.  This information is not intended to replace advice given to you by your health care provider. Make sure you discuss any questions you have with your health care provider.  Document Released: 12/06/2012 Document Revised: 12/11/2017 Document Reviewed: 12/11/2017  [employer] Interactive Patient Education © 2019 [employer]

## 2020-02-17 ENCOUNTER — OFFICE VISIT (OUTPATIENT)
Dept: NEUROSURGERY | Facility: CLINIC | Age: 55
End: 2020-02-17

## 2020-02-17 ENCOUNTER — HOSPITAL ENCOUNTER (OUTPATIENT)
Dept: GENERAL RADIOLOGY | Facility: HOSPITAL | Age: 55
Discharge: HOME | End: 2020-02-17
Admitting: NURSE PRACTITIONER

## 2020-02-17 VITALS — HEIGHT: 78 IN | WEIGHT: 315 LBS | BODY MASS INDEX: 36.45 KG/M2

## 2020-02-17 DIAGNOSIS — S32.010A COMPRESSION FRACTURE OF L1 VERTEBRA, INITIAL ENCOUNTER (HCC): ICD-10-CM

## 2020-02-17 DIAGNOSIS — S32.010A COMPRESSION FRACTURE OF L1 VERTEBRA, INITIAL ENCOUNTER (HCC): Primary | ICD-10-CM

## 2020-02-17 DIAGNOSIS — E66.01 MORBID OBESITY (HCC): ICD-10-CM

## 2020-02-17 PROCEDURE — 99214 OFFICE O/P EST MOD 30 MIN: CPT | Performed by: NURSE PRACTITIONER

## 2020-02-17 PROCEDURE — 72100 X-RAY EXAM L-S SPINE 2/3 VWS: CPT
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
## 2020-02-17 NOTE — PATIENT INSTRUCTIONS
"DASH Eating Plan  DASH stands for \"Dietary Approaches to Stop Hypertension.\" The DASH eating plan is a healthy eating plan that has been shown to reduce high blood pressure (hypertension). It may also reduce your risk for type 2 diabetes, heart disease, and stroke. The DASH eating plan may also help with weight loss.  What are tips for following this plan?    General guidelines  · Avoid eating more than 2,300 mg (milligrams) of salt (sodium) a day. If you have hypertension, you may need to reduce your sodium intake to 1,500 mg a day.  · Limit alcohol intake to no more than 1 drink a day for nonpregnant women and 2 drinks a day for men. One drink equals 12 oz of beer, 5 oz of wine, or 1½ oz of hard liquor.  · Work with your health care provider to maintain a healthy body weight or to lose weight. Ask what an ideal weight is for you.  · Get at least 30 minutes of exercise that causes your heart to beat faster (aerobic exercise) most days of the week. Activities may include walking, swimming, or biking.  · Work with your health care provider or diet and nutrition specialist (dietitian) to adjust your eating plan to your individual calorie needs.  Reading food labels    · Check food labels for the amount of sodium per serving. Choose foods with less than 5 percent of the Daily Value of sodium. Generally, foods with less than 300 mg of sodium per serving fit into this eating plan.  · To find whole grains, look for the word \"whole\" as the first word in the ingredient list.  Shopping  · Buy products labeled as \"low-sodium\" or \"no salt added.\"  · Buy fresh foods. Avoid canned foods and premade or frozen meals.  Cooking  · Avoid adding salt when cooking. Use salt-free seasonings or herbs instead of table salt or sea salt. Check with your health care provider or pharmacist before using salt substitutes.  · Do not venegas foods. Cook foods using healthy methods such as baking, boiling, grilling, and broiling instead.  · Cook with " heart-healthy oils, such as olive, canola, soybean, or sunflower oil.  Meal planning  · Eat a balanced diet that includes:  ? 5 or more servings of fruits and vegetables each day. At each meal, try to fill half of your plate with fruits and vegetables.  ? Up to 6-8 servings of whole grains each day.  ? Less than 6 oz of lean meat, poultry, or fish each day. A 3-oz serving of meat is about the same size as a deck of cards. One egg equals 1 oz.  ? 2 servings of low-fat dairy each day.  ? A serving of nuts, seeds, or beans 5 times each week.  ? Heart-healthy fats. Healthy fats called Omega-3 fatty acids are found in foods such as flaxseeds and coldwater fish, like sardines, salmon, and mackerel.  · Limit how much you eat of the following:  ? Canned or prepackaged foods.  ? Food that is high in trans fat, such as fried foods.  ? Food that is high in saturated fat, such as fatty meat.  ? Sweets, desserts, sugary drinks, and other foods with added sugar.  ? Full-fat dairy products.  · Do not salt foods before eating.  · Try to eat at least 2 vegetarian meals each week.  · Eat more home-cooked food and less restaurant, buffet, and fast food.  · When eating at a restaurant, ask that your food be prepared with less salt or no salt, if possible.  What foods are recommended?  The items listed may not be a complete list. Talk with your dietitian about what dietary choices are best for you.  Grains  Whole-grain or whole-wheat bread. Whole-grain or whole-wheat pasta. Brown rice. Oatmeal. Quinoa. Bulgur. Whole-grain and low-sodium cereals. Zeny bread. Low-fat, low-sodium crackers. Whole-wheat flour tortillas.  Vegetables  Fresh or frozen vegetables (raw, steamed, roasted, or grilled). Low-sodium or reduced-sodium tomato and vegetable juice. Low-sodium or reduced-sodium tomato sauce and tomato paste. Low-sodium or reduced-sodium canned vegetables.  Fruits  All fresh, dried, or frozen fruit. Canned fruit in natural juice (without  added sugar).  Meat and other protein foods  Skinless chicken or turkey. Ground chicken or turkey. Pork with fat trimmed off. Fish and seafood. Egg whites. Dried beans, peas, or lentils. Unsalted nuts, nut butters, and seeds. Unsalted canned beans. Lean cuts of beef with fat trimmed off. Low-sodium, lean deli meat.  Dairy  Low-fat (1%) or fat-free (skim) milk. Fat-free, low-fat, or reduced-fat cheeses. Nonfat, low-sodium ricotta or cottage cheese. Low-fat or nonfat yogurt. Low-fat, low-sodium cheese.  Fats and oils  Soft margarine without trans fats. Vegetable oil. Low-fat, reduced-fat, or light mayonnaise and salad dressings (reduced-sodium). Canola, safflower, olive, soybean, and sunflower oils. Avocado.  Seasoning and other foods  Herbs. Spices. Seasoning mixes without salt. Unsalted popcorn and pretzels. Fat-free sweets.  What foods are not recommended?  The items listed may not be a complete list. Talk with your dietitian about what dietary choices are best for you.  Grains  Baked goods made with fat, such as croissants, muffins, or some breads. Dry pasta or rice meal packs.  Vegetables  Creamed or fried vegetables. Vegetables in a cheese sauce. Regular canned vegetables (not low-sodium or reduced-sodium). Regular canned tomato sauce and paste (not low-sodium or reduced-sodium). Regular tomato and vegetable juice (not low-sodium or reduced-sodium). Pickles. Olives.  Fruits  Canned fruit in a light or heavy syrup. Fried fruit. Fruit in cream or butter sauce.  Meat and other protein foods  Fatty cuts of meat. Ribs. Fried meat. Flores. Sausage. Bologna and other processed lunch meats. Salami. Fatback. Hotdogs. Bratwurst. Salted nuts and seeds. Canned beans with added salt. Canned or smoked fish. Whole eggs or egg yolks. Chicken or turkey with skin.  Dairy  Whole or 2% milk, cream, and half-and-half. Whole or full-fat cream cheese. Whole-fat or sweetened yogurt. Full-fat cheese. Nondairy creamers. Whipped toppings.  Processed cheese and cheese spreads.  Fats and oils  Butter. Stick margarine. Lard. Shortening. Ghee. Flores fat. Tropical oils, such as coconut, palm kernel, or palm oil.  Seasoning and other foods  Salted popcorn and pretzels. Onion salt, garlic salt, seasoned salt, table salt, and sea salt. Worcestershire sauce. Tartar sauce. Barbecue sauce. Teriyaki sauce. Soy sauce, including reduced-sodium. Steak sauce. Canned and packaged gravies. Fish sauce. Oyster sauce. Cocktail sauce. Horseradish that you find on the shelf. Ketchup. Mustard. Meat flavorings and tenderizers. Bouillon cubes. Hot sauce and Tabasco sauce. Premade or packaged marinades. Premade or packaged taco seasonings. Relishes. Regular salad dressings.  Where to find more information:  · National Heart, Lung, and Blood [place]: www.nhlbi.nih.gov  · American Heart Association: www.heart.org  Summary  · The DASH eating plan is a healthy eating plan that has been shown to reduce high blood pressure (hypertension). It may also reduce your risk for type 2 diabetes, heart disease, and stroke.  · With the DASH eating plan, you should limit salt (sodium) intake to 2,300 mg a day. If you have hypertension, you may need to reduce your sodium intake to 1,500 mg a day.  · When on the DASH eating plan, aim to eat more fresh fruits and vegetables, whole grains, lean proteins, low-fat dairy, and heart-healthy fats.  · Work with your health care provider or diet and nutrition specialist (dietitian) to adjust your eating plan to your individual calorie needs.  This information is not intended to replace advice given to you by your health care provider. Make sure you discuss any questions you have with your health care provider.  Document Released: 12/06/2012 Document Revised: 12/11/2017 Document Reviewed: 12/11/2017  [employer] Interactive Patient Education © 2019 [employer]      Smoking Tobacco Information, Adult  Smoking tobacco can be harmful to your health. Tobacco  contains a poisonous (toxic), colorless chemical called nicotine. Nicotine is addictive. It changes the brain and can make it hard to stop smoking. Tobacco also has other toxic chemicals that can hurt your body and raise your risk of many cancers.  How can smoking tobacco affect me?  Smoking tobacco puts you at risk for:  · Cancer. Smoking is most commonly associated with lung cancer, but can also lead to cancer in other parts of the body.  · Chronic obstructive pulmonary disease (COPD). This is a long-term lung condition that makes it hard to breathe. It also gets worse over time.  · High blood pressure (hypertension), heart disease, stroke, or heart attack.  · Lung infections, such as pneumonia.  · Cataracts. This is when the lenses in the eyes become clouded.  · Digestive problems. This may include peptic ulcers, heartburn, and gastroesophageal reflux disease (GERD).  · Oral health problems, such as gum disease and tooth loss.  · Loss of taste and smell.  Smoking can affect your appearance by causing:  · Wrinkles.  · Yellow or stained teeth, fingers, and fingernails.  Smoking tobacco can also affect your social life, because:  · It may be challenging to find places to smoke when away from home. Many workplaces, restaurants, hotels, and public places are tobacco-free.  · Smoking is expensive. This is due to the cost of tobacco and the long-term costs of treating health problems from smoking.  · Secondhand smoke may affect those around you. Secondhand smoke can cause lung cancer, breathing problems, and heart disease. Children of smokers have a higher risk for:  ? Sudden infant death syndrome (SIDS).  ? Ear infections.  ? Lung infections.  If you currently smoke tobacco, quitting now can help you:  · Lead a longer and healthier life.  · Look, smell, breathe, and feel better over time.  · Save money.  · Protect others from the harms of secondhand smoke.  What actions can I take to prevent health problems?  Quit  smoking    · Do not start smoking. Quit if you already do.  · Make a plan to quit smoking and commit to it. Look for programs to help you and ask your health care provider for recommendations and ideas.  · Set a date and write down all the reasons you want to quit.  · Let your friends and family know you are quitting so they can help and support you. Consider finding friends who also want to quit. It can be easier to quit with someone else, so that you can support each other.  · Talk with your health care provider about using nicotine replacement medicines to help you quit, such as gum, lozenges, patches, sprays, or pills.  · Do not replace cigarette smoking with electronic cigarettes, which are commonly called e-cigarettes. The safety of e-cigarettes is not known, and some may contain harmful chemicals.  · If you try to quit but return to smoking, stay positive. It is common to slip up when you first quit, so take it one day at a time.  · Be prepared for cravings. When you feel the urge to smoke, chew gum or suck on hard candy.  Lifestyle  · Stay busy and take care of your body.  · Drink enough fluid to keep your urine pale yellow.  · Get plenty of exercise and eat a healthy diet. This can help prevent weight gain after quitting.  · Monitor your eating habits. Quitting smoking can cause you to have a larger appetite than when you smoke.  · Find ways to relax. Go out with friends or family to a movie or a restaurant where people do not smoke.  · Ask your health care provider about having regular tests (screenings) to check for cancer. This may include blood tests, imaging tests, and other tests.  · Find ways to manage your stress, such as meditation, yoga, or exercise.  Where to find support  To get support to quit smoking, consider:  · Asking your health care provider for more information and resources.  · Taking classes to learn more about quitting smoking.  · Looking for local organizations that offer resources  about quitting smoking.  · Joining a support group for people who want to quit smoking in your local community.  · Calling the smokefree.gov counselor helpline: 1-800-Quit-Now (1-367.578.6875)  Where to find more information  You may find more information about quitting smoking from:  · HelpGuide.org: www.helpguide.org  · Smokefree.gov: smokefree.gov  · American Lung Association: www.lung.org  Contact a health care provider if you:  · Have problems breathing.  · Notice that your lips, nose, or fingers turn blue.  · Have chest pain.  · Are coughing up blood.  · Feel faint or you pass out.  · Have other health changes that cause you to worry.  Summary  · Smoking tobacco can negatively affect your health, the health of those around you, your finances, and your social life.  · Do not start smoking. Quit if you already do. If you need help quitting, ask your health care provider.  · Think about joining a support group for people who want to quit smoking in your local community. There are many effective programs that will help you to quit this behavior.  This information is not intended to replace advice given to you by your health care provider. Make sure you discuss any questions you have with your health care provider.  Document Released: 01/02/2018 Document Revised: 02/06/2019 Document Reviewed: 01/02/2018  [employer] Interactive Patient Education © 2019 [employer]

## 2020-03-30 ENCOUNTER — TELEPHONE (OUTPATIENT)
Dept: INTERNAL MEDICINE | Age: 55
End: 2020-03-30

## 2020-03-30 DIAGNOSIS — E11.9 TYPE 2 DIABETES MELLITUS WITHOUT COMPLICATION, WITH LONG-TERM CURRENT USE OF INSULIN (HCC): ICD-10-CM

## 2020-03-30 DIAGNOSIS — M19.079 OSTEOARTHRITIS OF ANKLE, UNSPECIFIED LATERALITY, UNSPECIFIED OSTEOARTHRITIS TYPE: ICD-10-CM

## 2020-03-30 DIAGNOSIS — Z79.4 TYPE 2 DIABETES MELLITUS WITHOUT COMPLICATION, WITH LONG-TERM CURRENT USE OF INSULIN (HCC): ICD-10-CM

## 2020-03-30 DIAGNOSIS — E78.00 HYPERCHOLESTEREMIA: ICD-10-CM

## 2020-03-30 LAB
ALBUMIN SERPL-MCNC: 4.5 G/DL (ref 3.5–5.2)
ALP BLD-CCNC: 66 U/L (ref 40–130)
ALT SERPL-CCNC: 54 U/L (ref 5–41)
ANION GAP SERPL CALCULATED.3IONS-SCNC: 16 MMOL/L (ref 7–19)
AST SERPL-CCNC: 35 U/L (ref 5–40)
BASOPHILS ABSOLUTE: 0.1 K/UL (ref 0–0.2)
BASOPHILS RELATIVE PERCENT: 1 % (ref 0–1)
BILIRUB SERPL-MCNC: 0.5 MG/DL (ref 0.2–1.2)
BUN BLDV-MCNC: 11 MG/DL (ref 6–20)
CALCIUM SERPL-MCNC: 9.8 MG/DL (ref 8.6–10)
CHLORIDE BLD-SCNC: 99 MMOL/L (ref 98–111)
CHOLESTEROL, TOTAL: 230 MG/DL (ref 160–199)
CO2: 24 MMOL/L (ref 22–29)
CREAT SERPL-MCNC: 0.9 MG/DL (ref 0.5–1.2)
EOSINOPHILS ABSOLUTE: 0.3 K/UL (ref 0–0.6)
EOSINOPHILS RELATIVE PERCENT: 2.4 % (ref 0–5)
GFR NON-AFRICAN AMERICAN: >60
GLUCOSE BLD-MCNC: 134 MG/DL (ref 74–109)
HBA1C MFR BLD: 7.6 % (ref 4–6)
HCT VFR BLD CALC: 50.1 % (ref 42–52)
HDLC SERPL-MCNC: 48 MG/DL (ref 55–121)
HEMOGLOBIN: 16 G/DL (ref 14–18)
IMMATURE GRANULOCYTES #: 0.1 K/UL
LDL CHOLESTEROL CALCULATED: 153 MG/DL
LYMPHOCYTES ABSOLUTE: 4.1 K/UL (ref 1.1–4.5)
LYMPHOCYTES RELATIVE PERCENT: 34.9 % (ref 20–40)
MCH RBC QN AUTO: 28 PG (ref 27–31)
MCHC RBC AUTO-ENTMCNC: 31.9 G/DL (ref 33–37)
MCV RBC AUTO: 87.6 FL (ref 80–94)
MONOCYTES ABSOLUTE: 0.9 K/UL (ref 0–0.9)
MONOCYTES RELATIVE PERCENT: 7.7 % (ref 0–10)
NEUTROPHILS ABSOLUTE: 6.2 K/UL (ref 1.5–7.5)
NEUTROPHILS RELATIVE PERCENT: 53.3 % (ref 50–65)
PDW BLD-RTO: 13.6 % (ref 11.5–14.5)
PLATELET # BLD: 263 K/UL (ref 130–400)
PMV BLD AUTO: 10.7 FL (ref 9.4–12.4)
POTASSIUM SERPL-SCNC: 4.3 MMOL/L (ref 3.5–5)
PROSTATE SPECIFIC ANTIGEN: 0.73 NG/ML (ref 0–4)
RBC # BLD: 5.72 M/UL (ref 4.7–6.1)
SODIUM BLD-SCNC: 139 MMOL/L (ref 136–145)
TOTAL PROTEIN: 7.5 G/DL (ref 6.6–8.7)
TRIGL SERPL-MCNC: 143 MG/DL (ref 0–149)
WBC # BLD: 11.6 K/UL (ref 4.8–10.8)

## 2020-04-06 ENCOUNTER — VIRTUAL VISIT (OUTPATIENT)
Dept: INTERNAL MEDICINE | Age: 55
End: 2020-04-06
Payer: COMMERCIAL

## 2020-04-06 PROCEDURE — 99214 OFFICE O/P EST MOD 30 MIN: CPT | Performed by: INTERNAL MEDICINE

## 2020-04-06 PROCEDURE — 3051F HG A1C>EQUAL 7.0%<8.0%: CPT | Performed by: INTERNAL MEDICINE

## 2020-04-06 RX ORDER — TADALAFIL 10 MG/1
10 TABLET ORAL DAILY PRN
Qty: 10 TABLET | Refills: 0 | Status: SHIPPED | OUTPATIENT
Start: 2020-04-06 | End: 2020-07-08 | Stop reason: SDUPTHER

## 2020-04-06 ASSESSMENT — ENCOUNTER SYMPTOMS
VOMITING: 0
ABDOMINAL DISTENTION: 0
SHORTNESS OF BREATH: 0
ABDOMINAL PAIN: 0
SORE THROAT: 0
NAUSEA: 0
TROUBLE SWALLOWING: 0
EYE ITCHING: 0
BLOOD IN STOOL: 0
EYE DISCHARGE: 0
WHEEZING: 0
BACK PAIN: 0
SINUS PRESSURE: 1

## 2020-04-06 NOTE — TELEPHONE ENCOUNTER
Courtland Marion called requesting a refill of the below medication which has been pended for you:     Requested Prescriptions     Pending Prescriptions Disp Refills    tadalafil (CIALIS) 10 MG tablet 10 tablet 0     Sig: Take 1 tablet by mouth daily as needed for Erectile Dysfunction       Last Appointment Date: 8/19/2019  Next Appointment Date: Visit date not found    Allergies   Allergen Reactions    Penicillins Shortness Of Breath    Ketorolac Tromethamine Hives and Itching    Sulfa Antibiotics Hives

## 2020-04-06 NOTE — PROGRESS NOTES
and/or call 911 if deemed necessary. Services were provided through a video synchronous discussion virtually to substitute for in-person clinic visit. Patient and provider were located at their individual homes. --Danyelle Collins MD on 4/6/2020 at 3:52 PM    An electronic signature was used to authenticate this note.

## 2020-06-16 ENCOUNTER — TRANSCRIBE ORDERS (OUTPATIENT)
Dept: ADMINISTRATIVE | Facility: HOSPITAL | Age: 55
End: 2020-06-16

## 2020-06-16 DIAGNOSIS — Z01.818 PREOP TESTING: Primary | ICD-10-CM

## 2020-07-09 RX ORDER — DICLOFENAC SODIUM 75 MG/1
75 TABLET, DELAYED RELEASE ORAL 2 TIMES DAILY
Qty: 60 TABLET | Refills: 3 | Status: SHIPPED | OUTPATIENT
Start: 2020-07-09 | End: 2020-11-17 | Stop reason: SDUPTHER

## 2020-07-09 RX ORDER — TADALAFIL 10 MG/1
10 TABLET ORAL DAILY PRN
Qty: 10 TABLET | Refills: 3 | Status: SHIPPED | OUTPATIENT
Start: 2020-07-09 | End: 2022-01-03 | Stop reason: SDUPTHER

## 2020-07-09 NOTE — TELEPHONE ENCOUNTER
Pt has been getting diclofenac from pain management and likes it better that mobic.  Will you rx it diclofenac 75  bid

## 2020-07-13 ENCOUNTER — TELEPHONE (OUTPATIENT)
Dept: INTERNAL MEDICINE | Age: 55
End: 2020-07-13

## 2020-08-12 ENCOUNTER — TELEPHONE (OUTPATIENT)
Dept: INTERNAL MEDICINE | Age: 55
End: 2020-08-12

## 2020-09-29 DIAGNOSIS — E78.00 HYPERCHOLESTEREMIA: ICD-10-CM

## 2020-09-29 DIAGNOSIS — E11.9 TYPE 2 DIABETES MELLITUS WITHOUT COMPLICATION, WITH LONG-TERM CURRENT USE OF INSULIN (HCC): ICD-10-CM

## 2020-09-29 DIAGNOSIS — Z79.4 TYPE 2 DIABETES MELLITUS WITHOUT COMPLICATION, WITH LONG-TERM CURRENT USE OF INSULIN (HCC): ICD-10-CM

## 2020-09-29 DIAGNOSIS — E66.01 MORBID OBESITY DUE TO EXCESS CALORIES (HCC): ICD-10-CM

## 2020-09-29 LAB
ALBUMIN SERPL-MCNC: 4.3 G/DL (ref 3.5–5.2)
ALP BLD-CCNC: 63 U/L (ref 40–130)
ALT SERPL-CCNC: 77 U/L (ref 5–41)
ANION GAP SERPL CALCULATED.3IONS-SCNC: 14 MMOL/L (ref 7–19)
AST SERPL-CCNC: 54 U/L (ref 5–40)
BILIRUB SERPL-MCNC: 0.6 MG/DL (ref 0.2–1.2)
BUN BLDV-MCNC: 15 MG/DL (ref 6–20)
CALCIUM SERPL-MCNC: 9.2 MG/DL (ref 8.6–10)
CHLORIDE BLD-SCNC: 101 MMOL/L (ref 98–111)
CHOLESTEROL, TOTAL: 230 MG/DL (ref 160–199)
CO2: 24 MMOL/L (ref 22–29)
CREAT SERPL-MCNC: 1 MG/DL (ref 0.5–1.2)
GFR AFRICAN AMERICAN: >59
GFR NON-AFRICAN AMERICAN: >60
GLUCOSE BLD-MCNC: 190 MG/DL (ref 74–109)
HBA1C MFR BLD: 8.6 % (ref 4–6)
HDLC SERPL-MCNC: 37 MG/DL (ref 55–121)
LDL CHOLESTEROL CALCULATED: 156 MG/DL
POTASSIUM SERPL-SCNC: 4.4 MMOL/L (ref 3.5–5)
SODIUM BLD-SCNC: 139 MMOL/L (ref 136–145)
TOTAL PROTEIN: 7 G/DL (ref 6.6–8.7)
TRIGL SERPL-MCNC: 185 MG/DL (ref 0–149)

## 2020-10-05 ENCOUNTER — OFFICE VISIT (OUTPATIENT)
Dept: INTERNAL MEDICINE | Age: 55
End: 2020-10-05
Payer: COMMERCIAL

## 2020-10-05 VITALS
SYSTOLIC BLOOD PRESSURE: 137 MMHG | HEIGHT: 78 IN | BODY MASS INDEX: 36.45 KG/M2 | OXYGEN SATURATION: 98 % | HEART RATE: 76 BPM | WEIGHT: 315 LBS | DIASTOLIC BLOOD PRESSURE: 76 MMHG

## 2020-10-05 PROCEDURE — 90686 IIV4 VACC NO PRSV 0.5 ML IM: CPT | Performed by: INTERNAL MEDICINE

## 2020-10-05 PROCEDURE — 3052F HG A1C>EQUAL 8.0%<EQUAL 9.0%: CPT | Performed by: INTERNAL MEDICINE

## 2020-10-05 PROCEDURE — 90471 IMMUNIZATION ADMIN: CPT | Performed by: INTERNAL MEDICINE

## 2020-10-05 PROCEDURE — 99214 OFFICE O/P EST MOD 30 MIN: CPT | Performed by: INTERNAL MEDICINE

## 2020-10-05 ASSESSMENT — ENCOUNTER SYMPTOMS
ABDOMINAL PAIN: 0
SHORTNESS OF BREATH: 0
BACK PAIN: 0
EYE ITCHING: 0
NAUSEA: 0
TROUBLE SWALLOWING: 0
WHEEZING: 0
EYE DISCHARGE: 0
BLOOD IN STOOL: 0
VOMITING: 0
ABDOMINAL DISTENTION: 0
SORE THROAT: 0
SINUS PRESSURE: 0

## 2020-10-05 ASSESSMENT — PATIENT HEALTH QUESTIONNAIRE - PHQ9
SUM OF ALL RESPONSES TO PHQ9 QUESTIONS 1 & 2: 0
SUM OF ALL RESPONSES TO PHQ QUESTIONS 1-9: 0
1. LITTLE INTEREST OR PLEASURE IN DOING THINGS: 0
2. FEELING DOWN, DEPRESSED OR HOPELESS: 0
SUM OF ALL RESPONSES TO PHQ QUESTIONS 1-9: 0

## 2020-10-05 NOTE — PROGRESS NOTES
[facility] PHYSICIAN SERVICES  [facility] INTERNAL MEDICINE  [address]  [facility] [place] [address]  Dept: [phone]  Dept Fax: [phone]: [phone]      Visit Date: 10/5/2020    Med blount 50 y.o. male who presents today for:  Chief Complaint   Patient presents with    Follow-up    Foot Pain     needs foot exam    Numbness     both foot         HPI:     Jenn Richard is in for six-month follow-up visit. He is 50years old with diabetes. He is having some problems with numbness in his feet and he is got some calluses on the bottoms of his feet with some skin cracking and he wants some diabetic shoes. He had lab for review. No past medical history on file. Past Surgical History:   Procedure Laterality Date    CYST REMOVAL      TENDON MANIPULATION Right        No family history on file. Social History     Tobacco Use    Smoking status: Former Smoker    Smokeless tobacco: Never Used   Substance Use Topics    Alcohol use: No      Current Outpatient Medications   Medication Sig Dispense Refill    metFORMIN (GLUCOPHAGE) 500 MG tablet Take 1 tablet by mouth 2 times daily (with meals) 60 tablet 2    diclofenac sodium (VOLTAREN) 1 % GEL       tadalafil (CIALIS) 10 MG tablet Take 1 tablet by mouth daily as needed for Erectile Dysfunction 10 tablet 3    diclofenac (VOLTAREN) 75 MG EC tablet Take 1 tablet by mouth 2 times daily 60 tablet 3     No current facility-administered medications for this visit. Allergies   Allergen Reactions    Penicillins Shortness Of Breath    Iodine Nausea Only    Ketorolac Tromethamine Hives and Itching    Sulfa Antibiotics Hives         Subjective:     Review of Systems   Constitutional: Negative for activity change, appetite change, fatigue and fever. HENT: Negative for congestion, hearing loss, sinus pressure, sore throat and trouble swallowing. Eyes: Negative for discharge and itching.    Respiratory: Negative for shortness of breath and wheezing. Cardiovascular: Negative for chest pain, palpitations and leg swelling. Gastrointestinal: Negative for abdominal distention, abdominal pain, blood in stool, nausea and vomiting. Endocrine: Negative for cold intolerance, heat intolerance and polydipsia. Genitourinary: Negative for flank pain, frequency, hematuria and urgency. Musculoskeletal: Negative for arthralgias, back pain and joint swelling. Skin: Negative for rash and wound. Allergic/Immunologic: Negative for environmental allergies and food allergies. Neurological: Negative for dizziness, tremors, syncope, weakness, numbness and headaches. Hematological: Negative for adenopathy. Psychiatric/Behavioral: Negative for agitation and hallucinations. The patient is not nervous/anxious. Objective:      /76   Pulse 76   Ht 6' 6\" (1.981 m)   Wt (!) 383 lb (173.7 kg)   SpO2 98%   BMI 44.26 kg/m²    Physical Exam  Constitutional:       General: He is not in acute distress. Appearance: He is well-developed. He is not diaphoretic. HENT:      Head: Normocephalic and atraumatic. Right Ear: External ear normal.      Left Ear: External ear normal.      Nose: Nose normal.      Mouth/Throat:      Pharynx: No oropharyngeal exudate. Eyes:      General: No scleral icterus. Right eye: No discharge. Left eye: No discharge. Conjunctiva/sclera: Conjunctivae normal.      Pupils: Pupils are equal, round, and reactive to light. Neck:      Musculoskeletal: Normal range of motion and neck supple. Thyroid: No thyromegaly. Vascular: No JVD. Trachea: No tracheal deviation. Cardiovascular:      Rate and Rhythm: Normal rate and regular rhythm. Heart sounds: Normal heart sounds. No murmur. No friction rub. No gallop. Pulmonary:      Effort: Pulmonary effort is normal. No respiratory distress. Breath sounds: Normal breath sounds. No wheezing or rales.    Abdominal: General: Bowel sounds are normal. There is no distension. Palpations: Abdomen is soft. There is no mass. Tenderness: There is no abdominal tenderness. There is no guarding or rebound. Musculoskeletal: Normal range of motion. General: No tenderness or deformity. Comments: Calluses on the bottoms of both feet. There is no evidence of any infection at he does have some cracking of the skin of the heels   Lymphadenopathy:      Cervical: No cervical adenopathy. Skin:     General: Skin is warm and dry. Coloration: Skin is not pale. Findings: No erythema or rash. Neurological:      Mental Status: He is alert and oriented to person, place, and time. Cranial Nerves: No cranial nerve deficit. Motor: No abnormal muscle tone. Coordination: Coordination normal.      Deep Tendon Reflexes: Reflexes are normal and symmetric. Reflexes normal.   Psychiatric:         Behavior: Behavior normal.         Thought Content: Thought content normal.         Judgment: Judgment normal.          Assessment:      Diagnosis Orders   1. Hypercholesteremia  CBC Auto Differential    Comprehensive Metabolic Panel    Hemoglobin A1C    Lipid Panel    Psa screening   2. Type 2 diabetes mellitus without complication, with long-term current use of insulin ([facility])  CBC Auto Differential    Comprehensive Metabolic Panel    Hemoglobin A1C    Lipid Panel    Psa screening   3. Morbid obesity due to excess calories ([facility])  CBC Auto Differential    Comprehensive Metabolic Panel    Hemoglobin A1C    Lipid Panel    Psa screening   4. Osteoarthritis of ankle, unspecified laterality, unspecified osteoarthritis type  CBC Auto Differential    Comprehensive Metabolic Panel    Hemoglobin A1C    Lipid Panel    Psa screening            Plan:     Hypercholesterolemia. His numbers show that his LDL is up his total cholesterol is up and his triglycerides are up.   He is not been dieting or exercise as much as he should and is going to try to do better. Type 2 diabetes mellitus. His sugars. His hemoglobin A1c is up to 8.6 from 7.6 last time. He is not been watching his diet. I am going to increase his metformin to 500 mg twice daily. He is also got some foot breakdown with a callus and some cracking of his heels and needs a diabetic ulcer shoes. I meant to give him a prescription for diabetic shoes today and the exam is patible with the need. Morbid obesity. He is gained some weight and is not been exercising as much and we talked about that today. His BMI is 44.2. Osteoarthritis wh is unchanged. He is still on the diclofenac gel and pill. Overall he needs to become more dietary compliant. I will to give him the diabetic shoe prescription. Increase the Metformin. See him back in 6 months and recheck his blood. Have spent 25 minutes with patient today, 50% of the time is been spent counseling on cardiovascular risk factors, fall risk precautions, and immunizations,    Return in about 6 months (around 4/5/2021) for blood pressure check, liver ,lipid check, diabetes check, LAB 1 WEEK BEFORE APPOINTMENT. Patient given educational materials- see patient instructions. Discussed use, benefit, and side effects of prescribedmedications. All patient questions answered. Pt voiced understanding. Reviewedhealth maintenance. Instructed to continue current medications, diet and exercise. Patient agreed with treatment plan. **This report has been created usingvoice recognition software. It may contain minor errors which are inherent in voicerecognition technology. **    Electronically signed by Alivia Matos MD on 10/5/2020 at 3:51 PM

## 2020-11-18 NOTE — TELEPHONE ENCOUNTER
Elsi Radon called requesting a refill of the below medication which has been pended for you:     Requested Prescriptions     Pending Prescriptions Disp Refills    diclofenac (VOLTAREN) 75 MG EC tablet 60 tablet 3     Sig: Take 1 tablet by mouth 2 times daily       Last Appointment Date: 10/5/2020  Next Appointment Date: 4/6/2021    Allergies   Allergen Reactions    Penicillins Shortness Of Breath    Iodine Nausea Only    Ketorolac Tromethamine Hives and Itching    Sulfa Antibiotics Hives

## 2020-11-19 RX ORDER — DICLOFENAC SODIUM 75 MG/1
75 TABLET, DELAYED RELEASE ORAL 2 TIMES DAILY
Qty: 60 TABLET | Refills: 3 | Status: SHIPPED | OUTPATIENT
Start: 2020-11-19 | End: 2021-04-06 | Stop reason: SDUPTHER

## 2021-01-05 NOTE — TELEPHONE ENCOUNTER
Ari Godfrey called requesting a refill of the below medication which has been pended for you:     Requested Prescriptions     Pending Prescriptions Disp Refills    metFORMIN (GLUCOPHAGE) 500 MG tablet 60 tablet 2     Sig: Take 1 tablet by mouth 2 times daily (with meals)       Last Appointment Date: 10/5/2020  Next Appointment Date: 4/6/2021    Allergies   Allergen Reactions    Penicillins Shortness Of Breath    Iodine Nausea Only    Ketorolac Tromethamine Hives and Itching    Sulfa Antibiotics Hives

## 2021-03-31 DIAGNOSIS — E78.00 HYPERCHOLESTEREMIA: ICD-10-CM

## 2021-03-31 DIAGNOSIS — E11.9 TYPE 2 DIABETES MELLITUS WITHOUT COMPLICATION, WITH LONG-TERM CURRENT USE OF INSULIN (HCC): ICD-10-CM

## 2021-03-31 DIAGNOSIS — M19.079 OSTEOARTHRITIS OF ANKLE, UNSPECIFIED LATERALITY, UNSPECIFIED OSTEOARTHRITIS TYPE: ICD-10-CM

## 2021-03-31 DIAGNOSIS — E66.01 MORBID OBESITY DUE TO EXCESS CALORIES (HCC): ICD-10-CM

## 2021-03-31 DIAGNOSIS — Z79.4 TYPE 2 DIABETES MELLITUS WITHOUT COMPLICATION, WITH LONG-TERM CURRENT USE OF INSULIN (HCC): ICD-10-CM

## 2021-03-31 LAB
ALBUMIN SERPL-MCNC: 4.3 G/DL (ref 3.5–5.2)
ALP BLD-CCNC: 70 U/L (ref 40–130)
ALT SERPL-CCNC: 92 U/L (ref 5–41)
ANION GAP SERPL CALCULATED.3IONS-SCNC: 13 MMOL/L (ref 7–19)
AST SERPL-CCNC: 63 U/L (ref 5–40)
BASOPHILS ABSOLUTE: 0.1 K/UL (ref 0–0.2)
BASOPHILS RELATIVE PERCENT: 0.9 % (ref 0–1)
BILIRUB SERPL-MCNC: 0.4 MG/DL (ref 0.2–1.2)
BUN BLDV-MCNC: 12 MG/DL (ref 6–20)
CALCIUM SERPL-MCNC: 9.6 MG/DL (ref 8.6–10)
CHLORIDE BLD-SCNC: 102 MMOL/L (ref 98–111)
CHOLESTEROL, TOTAL: 231 MG/DL (ref 160–199)
CO2: 25 MMOL/L (ref 22–29)
CREAT SERPL-MCNC: 0.9 MG/DL (ref 0.5–1.2)
EOSINOPHILS ABSOLUTE: 0.4 K/UL (ref 0–0.6)
EOSINOPHILS RELATIVE PERCENT: 4 % (ref 0–5)
GFR AFRICAN AMERICAN: >59
GFR NON-AFRICAN AMERICAN: >60
GLUCOSE BLD-MCNC: 272 MG/DL (ref 74–109)
HBA1C MFR BLD: 9.1 % (ref 4–6)
HCT VFR BLD CALC: 48.5 % (ref 42–52)
HDLC SERPL-MCNC: 37 MG/DL (ref 55–121)
HEMOGLOBIN: 15.3 G/DL (ref 14–18)
IMMATURE GRANULOCYTES #: 0.1 K/UL
LDL CHOLESTEROL CALCULATED: 146 MG/DL
LYMPHOCYTES ABSOLUTE: 2.9 K/UL (ref 1.1–4.5)
LYMPHOCYTES RELATIVE PERCENT: 31.4 % (ref 20–40)
MCH RBC QN AUTO: 28.3 PG (ref 27–31)
MCHC RBC AUTO-ENTMCNC: 31.5 G/DL (ref 33–37)
MCV RBC AUTO: 89.6 FL (ref 80–94)
MONOCYTES ABSOLUTE: 0.7 K/UL (ref 0–0.9)
MONOCYTES RELATIVE PERCENT: 7.7 % (ref 0–10)
NEUTROPHILS ABSOLUTE: 5.1 K/UL (ref 1.5–7.5)
NEUTROPHILS RELATIVE PERCENT: 54.9 % (ref 50–65)
PDW BLD-RTO: 13.7 % (ref 11.5–14.5)
PLATELET # BLD: 211 K/UL (ref 130–400)
PMV BLD AUTO: 11.5 FL (ref 9.4–12.4)
POTASSIUM SERPL-SCNC: 4.3 MMOL/L (ref 3.5–5)
PROSTATE SPECIFIC ANTIGEN: 1.29 NG/ML (ref 0–4)
RBC # BLD: 5.41 M/UL (ref 4.7–6.1)
SODIUM BLD-SCNC: 140 MMOL/L (ref 136–145)
TOTAL PROTEIN: 7.1 G/DL (ref 6.6–8.7)
TRIGL SERPL-MCNC: 240 MG/DL (ref 0–149)
WBC # BLD: 9.2 K/UL (ref 4.8–10.8)

## 2021-04-06 ENCOUNTER — OFFICE VISIT (OUTPATIENT)
Dept: INTERNAL MEDICINE | Age: 56
End: 2021-04-06
Payer: COMMERCIAL

## 2021-04-06 VITALS
BODY MASS INDEX: 36.45 KG/M2 | HEIGHT: 78 IN | DIASTOLIC BLOOD PRESSURE: 88 MMHG | SYSTOLIC BLOOD PRESSURE: 148 MMHG | WEIGHT: 315 LBS | HEART RATE: 96 BPM

## 2021-04-06 DIAGNOSIS — Z79.4 TYPE 2 DIABETES MELLITUS WITHOUT COMPLICATION, WITH LONG-TERM CURRENT USE OF INSULIN (HCC): ICD-10-CM

## 2021-04-06 DIAGNOSIS — E78.00 HYPERCHOLESTEREMIA: Primary | ICD-10-CM

## 2021-04-06 DIAGNOSIS — M19.079 OSTEOARTHRITIS OF ANKLE, UNSPECIFIED LATERALITY, UNSPECIFIED OSTEOARTHRITIS TYPE: ICD-10-CM

## 2021-04-06 DIAGNOSIS — E11.9 TYPE 2 DIABETES MELLITUS WITHOUT COMPLICATION, WITH LONG-TERM CURRENT USE OF INSULIN (HCC): ICD-10-CM

## 2021-04-06 PROCEDURE — 99214 OFFICE O/P EST MOD 30 MIN: CPT | Performed by: INTERNAL MEDICINE

## 2021-04-06 RX ORDER — DICLOFENAC SODIUM 75 MG/1
75 TABLET, DELAYED RELEASE ORAL 2 TIMES DAILY
Qty: 180 TABLET | Refills: 1 | Status: SHIPPED | OUTPATIENT
Start: 2021-04-06 | End: 2021-10-05 | Stop reason: SDUPTHER

## 2021-04-06 SDOH — ECONOMIC STABILITY: FOOD INSECURITY: WITHIN THE PAST 12 MONTHS, THE FOOD YOU BOUGHT JUST DIDN'T LAST AND YOU DIDN'T HAVE MONEY TO GET MORE.: NEVER TRUE

## 2021-04-06 SDOH — ECONOMIC STABILITY: FOOD INSECURITY: WITHIN THE PAST 12 MONTHS, YOU WORRIED THAT YOUR FOOD WOULD RUN OUT BEFORE YOU GOT MONEY TO BUY MORE.: NEVER TRUE

## 2021-04-06 ASSESSMENT — PATIENT HEALTH QUESTIONNAIRE - PHQ9
SUM OF ALL RESPONSES TO PHQ9 QUESTIONS 1 & 2: 0
1. LITTLE INTEREST OR PLEASURE IN DOING THINGS: 0
2. FEELING DOWN, DEPRESSED OR HOPELESS: 0

## 2021-04-06 ASSESSMENT — ENCOUNTER SYMPTOMS
ABDOMINAL PAIN: 0
ABDOMINAL DISTENTION: 0
EYE DISCHARGE: 0
BACK PAIN: 0
NAUSEA: 0
EYE ITCHING: 0
SORE THROAT: 0
TROUBLE SWALLOWING: 0
VOMITING: 0
BLOOD IN STOOL: 0
SHORTNESS OF BREATH: 0
SINUS PRESSURE: 0
WHEEZING: 0

## 2021-04-06 NOTE — PROGRESS NOTES
[facility]ly [facility] PHYSICIAN SERVICES  [facility] INTERNAL MEDICINE  [address] [place] [address]  Dept: [phone]  Dept Fax: [phone]: [phone]      Visit Date: 4/6/2021    Goran blount 50 y.o. male who presents today for:  Chief Complaint   Patient presents with    Annual Exam         HPI:     Lola Brody is 50 and in for his annual exam with lab work reviewed. He is a diabetic with hypertension and hyperlipidemia. He has been noncompliant with his diet and exercise regimen. His lab for review    No past medical history on file. Past Surgical History:   Procedure Laterality Date    CYST REMOVAL      TENDON MANIPULATION Right        No family history on file. Social History     Tobacco Use    Smoking status: Former Smoker    Smokeless tobacco: Never Used   Substance Use Topics    Alcohol use: No      Current Outpatient Medications   Medication Sig Dispense Refill    diclofenac sodium (VOLTAREN) 1 % GEL Apply topically 2 times daily 1 g 5    diclofenac (VOLTAREN) 75 MG EC tablet Take 1 tablet by mouth 2 times daily 180 tablet 1    metFORMIN (GLUCOPHAGE) 1000 MG tablet Take 1 tablet by mouth 2 times daily (with meals) 180 tablet 1    diclofenac sodium (VOLTAREN) 1 % GEL       tadalafil (CIALIS) 10 MG tablet Take 1 tablet by mouth daily as needed for Erectile Dysfunction 10 tablet 3     No current facility-administered medications for this visit. Allergies   Allergen Reactions    Penicillins Shortness Of Breath    Iodine Nausea Only    Ketorolac Tromethamine Hives and Itching    Sulfa Antibiotics Hives         Subjective:     Review of Systems   Constitutional: Negative for activity change, appetite change, fatigue and fever. HENT: Negative for congestion, hearing loss, sinus pressure, sore throat and trouble swallowing. Eyes: Negative for discharge and itching. Respiratory: Negative for shortness of breath and wheezing.     Cardiovascular: Negative for chest pain, palpitations and leg swelling. Gastrointestinal: Negative for abdominal distention, abdominal pain, blood in stool, nausea and vomiting. Endocrine: Negative for cold intolerance, heat intolerance and polydipsia. Genitourinary: Negative for flank pain, frequency, hematuria and urgency. Musculoskeletal: Negative for arthralgias, back pain and joint swelling. Skin: Negative for rash and wound. Allergic/Immunologic: Negative for environmental allergies and food allergies. Neurological: Negative for dizziness, tremors, syncope, weakness, numbness and headaches. Hematological: Negative for adenopathy. Psychiatric/Behavioral: Negative for agitation and hallucinations. The patient is not nervous/anxious. Objective:      BP (!) 148/88   Pulse 96   Ht 6' 7\" (2.007 m)   Wt (!) 375 lb (170.1 kg)   BMI 42.25 kg/m²    Physical Exam  Constitutional:       General: He is not in acute distress. Appearance: He is well-developed. He is not diaphoretic. HENT:      Head: Normocephalic and atraumatic. Right Ear: External ear normal.      Left Ear: External ear normal.      Nose: Nose normal.      Mouth/Throat:      Pharynx: No oropharyngeal exudate. Eyes:      General: No scleral icterus. Right eye: No discharge. Left eye: No discharge. Conjunctiva/sclera: Conjunctivae normal.      Pupils: Pupils are equal, round, and reactive to light. Neck:      Musculoskeletal: Normal range of motion and neck supple. Thyroid: No thyromegaly. Vascular: No JVD. Trachea: No tracheal deviation. Cardiovascular:      Rate and Rhythm: Normal rate and regular rhythm. Heart sounds: Normal heart sounds. No murmur. No friction rub. No gallop. Pulmonary:      Effort: Pulmonary effort is normal. No respiratory distress. Breath sounds: Normal breath sounds. No wheezing or rales. Abdominal:      General: Bowel sounds are normal. There is no distension. I will increase the Metformin to 1000 mg twice a day in the interim. While were waiting to get into see [facility]. She may have some other ideas after she sees them and goes over dietary requirements. Osteoarthritis which is stable. If he would lose some weight his arthritis would improve tremendously. As above we will make the referral for diabetic counseling education and I am going to see him back again in 2 months and recheck his sugar. Return in about 2 months (around 6/6/2021) for diabetes check. Patient given educational materials- see patient instructions. Discussed use, benefit, and side effects of prescribedmedications. All patient questions answered. Pt voiced understanding. Reviewedhealth maintenance. Instructed to continue current medications, diet and exercise. Patient agreed with treatment plan. **This report has been created usingvoice recognition software. It may contain minor errors which are inherent in voicerecognition technology. **    Electronically signed by Eliz Leger MD on 4/6/2021 at 4:06 PM

## 2021-04-08 DIAGNOSIS — E66.01 MORBID OBESITY DUE TO EXCESS CALORIES (HCC): ICD-10-CM

## 2021-04-08 DIAGNOSIS — Z79.4 TYPE 2 DIABETES MELLITUS WITHOUT COMPLICATION, WITH LONG-TERM CURRENT USE OF INSULIN (HCC): Primary | ICD-10-CM

## 2021-04-08 DIAGNOSIS — E11.9 TYPE 2 DIABETES MELLITUS WITHOUT COMPLICATION, WITH LONG-TERM CURRENT USE OF INSULIN (HCC): Primary | ICD-10-CM

## 2021-05-03 RX ORDER — GLUCOSAMINE HCL/CHONDROITIN SU 500-400 MG
CAPSULE ORAL
Qty: 200 STRIP | Refills: 5 | Status: SHIPPED | OUTPATIENT
Start: 2021-05-03 | End: 2022-05-14 | Stop reason: SDUPTHER

## 2021-06-02 DIAGNOSIS — M19.079 OSTEOARTHRITIS OF ANKLE, UNSPECIFIED LATERALITY, UNSPECIFIED OSTEOARTHRITIS TYPE: ICD-10-CM

## 2021-06-02 DIAGNOSIS — E78.00 HYPERCHOLESTEREMIA: ICD-10-CM

## 2021-06-02 DIAGNOSIS — Z79.4 TYPE 2 DIABETES MELLITUS WITHOUT COMPLICATION, WITH LONG-TERM CURRENT USE OF INSULIN (HCC): ICD-10-CM

## 2021-06-02 DIAGNOSIS — E11.9 TYPE 2 DIABETES MELLITUS WITHOUT COMPLICATION, WITH LONG-TERM CURRENT USE OF INSULIN (HCC): ICD-10-CM

## 2021-06-02 LAB
ALBUMIN SERPL-MCNC: 4.3 G/DL (ref 3.5–5.2)
ALP BLD-CCNC: 53 U/L (ref 40–130)
ALT SERPL-CCNC: 76 U/L (ref 5–41)
ANION GAP SERPL CALCULATED.3IONS-SCNC: 10 MMOL/L (ref 7–19)
AST SERPL-CCNC: 52 U/L (ref 5–40)
BILIRUB SERPL-MCNC: 0.6 MG/DL (ref 0.2–1.2)
BUN BLDV-MCNC: 12 MG/DL (ref 6–20)
CALCIUM SERPL-MCNC: 9.5 MG/DL (ref 8.6–10)
CHLORIDE BLD-SCNC: 104 MMOL/L (ref 98–111)
CO2: 27 MMOL/L (ref 22–29)
CREAT SERPL-MCNC: 0.9 MG/DL (ref 0.5–1.2)
GFR AFRICAN AMERICAN: >59
GFR NON-AFRICAN AMERICAN: >60
GLUCOSE BLD-MCNC: 117 MG/DL (ref 74–109)
HBA1C MFR BLD: 7.8 % (ref 4–6)
POTASSIUM SERPL-SCNC: 4.2 MMOL/L (ref 3.5–5)
SODIUM BLD-SCNC: 141 MMOL/L (ref 136–145)
TOTAL PROTEIN: 6.8 G/DL (ref 6.6–8.7)

## 2021-06-07 ENCOUNTER — OFFICE VISIT (OUTPATIENT)
Dept: INTERNAL MEDICINE | Age: 56
End: 2021-06-07
Payer: COMMERCIAL

## 2021-06-07 VITALS
OXYGEN SATURATION: 99 % | DIASTOLIC BLOOD PRESSURE: 84 MMHG | WEIGHT: 315 LBS | BODY MASS INDEX: 41.23 KG/M2 | HEART RATE: 74 BPM | SYSTOLIC BLOOD PRESSURE: 138 MMHG

## 2021-06-07 DIAGNOSIS — E11.9 TYPE 2 DIABETES MELLITUS WITHOUT COMPLICATION, WITH LONG-TERM CURRENT USE OF INSULIN (HCC): Primary | ICD-10-CM

## 2021-06-07 DIAGNOSIS — Z79.4 TYPE 2 DIABETES MELLITUS WITHOUT COMPLICATION, WITH LONG-TERM CURRENT USE OF INSULIN (HCC): Primary | ICD-10-CM

## 2021-06-07 PROCEDURE — 3051F HG A1C>EQUAL 7.0%<8.0%: CPT | Performed by: INTERNAL MEDICINE

## 2021-06-07 PROCEDURE — 99214 OFFICE O/P EST MOD 30 MIN: CPT | Performed by: INTERNAL MEDICINE

## 2021-06-07 ASSESSMENT — ENCOUNTER SYMPTOMS
WHEEZING: 0
NAUSEA: 0
SHORTNESS OF BREATH: 0
BLOOD IN STOOL: 0
EYE ITCHING: 0
ABDOMINAL DISTENTION: 0
BACK PAIN: 0
EYE DISCHARGE: 0
ABDOMINAL PAIN: 0
TROUBLE SWALLOWING: 0
VOMITING: 0
SORE THROAT: 0
SINUS PRESSURE: 0

## 2021-06-07 NOTE — PROGRESS NOTES
[facility] PHYSICIAN SERVICES  [facility] INTERNAL MEDICINE  [address]  [facility] [place] [address]  Dept: [phone]  Dept Fax: [phone] 33: [phone]      Visit Date: 6/7/2021    Ameena Dawkins a 50 y.o. male who presents today for:  Chief Complaint   Patient presents with    Follow-up    Diabetes         HPI:     Estuardo Dejesus is in for follow-up on his diabetes. When we saw him last time his sugar was high we started him on a diabetic diet Metformin and gave him a glucometer and he is making much headway as far as making his numbers look better and he feels better. No past medical history on file. Past Surgical History:   Procedure Laterality Date    CYST REMOVAL      TENDON MANIPULATION Right        No family history on file. Social History     Tobacco Use    Smoking status: Former Smoker    Smokeless tobacco: Never Used   Substance Use Topics    Alcohol use: No      Current Outpatient Medications   Medication Sig Dispense Refill    blood glucose monitor strips Test 4 times a day DX E11.9 ONE TOUCH VERIO FLEX 200 strip 5    diclofenac (VOLTAREN) 75 MG EC tablet Take 1 tablet by mouth 2 times daily 180 tablet 1    metFORMIN (GLUCOPHAGE) 1000 MG tablet Take 1 tablet by mouth 2 times daily (with meals) 180 tablet 1    diclofenac sodium (VOLTAREN) 1 % GEL       tadalafil (CIALIS) 10 MG tablet Take 1 tablet by mouth daily as needed for Erectile Dysfunction 10 tablet 3    diclofenac sodium (VOLTAREN) 1 % GEL Apply topically 2 times daily 1 g 5     No current facility-administered medications for this visit. Allergies   Allergen Reactions    Penicillins Shortness Of Breath    Iodine Nausea Only    Ketorolac Tromethamine Hives and Itching    Sulfa Antibiotics Hives         Subjective:     Review of Systems   Constitutional: Negative for activity change, appetite change, fatigue and fever.    HENT: Negative for congestion, hearing loss, sinus pressure, sore throat and trouble swallowing. Eyes: Negative for discharge and itching. Respiratory: Negative for shortness of breath and wheezing. Cardiovascular: Negative for chest pain, palpitations and leg swelling. Gastrointestinal: Negative for abdominal distention, abdominal pain, blood in stool, nausea and vomiting. Endocrine: Negative for cold intolerance, heat intolerance and polydipsia. Genitourinary: Negative for flank pain, frequency, hematuria and urgency. Musculoskeletal: Negative for arthralgias, back pain and joint swelling. Skin: Negative for rash and wound. Allergic/Immunologic: Negative for environmental allergies and food allergies. Neurological: Negative for dizziness, tremors, syncope, weakness, numbness and headaches. Hematological: Negative for adenopathy. Psychiatric/Behavioral: Negative for agitation and hallucinations. The patient is not nervous/anxious. Objective:      /84   Pulse 74   Wt (!) 366 lb (166 kg)   SpO2 99%   BMI 41.23 kg/m²    Physical Exam  Constitutional:       General: He is not in acute distress. Appearance: He is well-developed. He is not diaphoretic. HENT:      Head: Normocephalic and atraumatic. Right Ear: External ear normal.      Left Ear: External ear normal.      Nose: Nose normal.      Mouth/Throat:      Pharynx: No oropharyngeal exudate. Eyes:      General: No scleral icterus. Right eye: No discharge. Left eye: No discharge. Conjunctiva/sclera: Conjunctivae normal.      Pupils: Pupils are equal, round, and reactive to light. Neck:      Thyroid: No thyromegaly. Vascular: No JVD. Trachea: No tracheal deviation. Cardiovascular:      Rate and Rhythm: Normal rate and regular rhythm. Heart sounds: Normal heart sounds. No murmur heard. No friction rub. No gallop. Pulmonary:      Effort: Pulmonary effort is normal. No respiratory distress. Breath sounds: Normal breath sounds.  No wheezing or rales.   Abdominal:      General: Bowel sounds are normal. There is no distension. Palpations: Abdomen is soft. There is no mass. Tenderness: There is no abdominal tenderness. There is no guarding or rebound. Musculoskeletal:         General: No tenderness or deformity. Normal range of motion. Cervical back: Normal range of motion and neck supple. Lymphadenopathy:      Cervical: No cervical adenopathy. Skin:     General: Skin is warm and dry. Coloration: Skin is not pale. Findings: No erythema or rash. Neurological:      Mental Status: He is alert and oriented to person, place, and time. Cranial Nerves: No cranial nerve deficit. Motor: No abnormal muscle tone. Coordination: Coordination normal.      Deep Tendon Reflexes: Reflexes are normal and symmetric. Reflexes normal.   Psychiatric:         Behavior: Behavior normal.         Thought Content: Thought content normal.         Judgment: Judgment normal.          Assessment:      Diagnosis Orders   1. Type 2 diabetes mellitus without complication, with long-term current use of insulin ([facility])              Plan:     Diabetes mellitus under better control. His hemoglobin A1c is gone from 9.1 back in March down to 7.8. His fasting sugar was 117 much better than the 270 before. He is trying to exercise he is got his glucometer and he has been checking his sugars 3-4 times daily and is taken control in charge of his disease. We will keep him on the same medication told him to continue to diet and exercise as he is doing. We will see him back and recheck in 3 months. No follow-ups on file. Patient given educational materials- see patient instructions. Discussed use, benefit, and side effects of prescribedmedications. All patient questions answered. Pt voiced understanding. Reviewedhealth maintenance. Instructed to continue current medications, diet and exercise. Patient agreed with treatment plan.      **This report has been created usingvoice recognition software. It may contain minor errors which are inherent in voicerecognition technology. **    Electronically signed by Ros Moreno MD on 6/7/2021 at 3:33 PM

## 2021-10-05 ENCOUNTER — OFFICE VISIT (OUTPATIENT)
Dept: INTERNAL MEDICINE | Age: 56
End: 2021-10-05
Payer: COMMERCIAL

## 2021-10-05 VITALS
OXYGEN SATURATION: 96 % | WEIGHT: 315 LBS | HEIGHT: 78 IN | RESPIRATION RATE: 16 BRPM | HEART RATE: 111 BPM | SYSTOLIC BLOOD PRESSURE: 136 MMHG | BODY MASS INDEX: 36.45 KG/M2 | DIASTOLIC BLOOD PRESSURE: 78 MMHG

## 2021-10-05 DIAGNOSIS — E78.00 HYPERCHOLESTEREMIA: ICD-10-CM

## 2021-10-05 DIAGNOSIS — Z79.4 TYPE 2 DIABETES MELLITUS WITHOUT COMPLICATION, WITH LONG-TERM CURRENT USE OF INSULIN (HCC): Primary | ICD-10-CM

## 2021-10-05 DIAGNOSIS — Z12.11 SCREENING FOR COLON CANCER: ICD-10-CM

## 2021-10-05 DIAGNOSIS — M19.079 OSTEOARTHRITIS OF ANKLE, UNSPECIFIED LATERALITY, UNSPECIFIED OSTEOARTHRITIS TYPE: ICD-10-CM

## 2021-10-05 DIAGNOSIS — M54.16 LUMBAR RADICULOPATHY: ICD-10-CM

## 2021-10-05 DIAGNOSIS — E11.9 TYPE 2 DIABETES MELLITUS WITHOUT COMPLICATION, WITH LONG-TERM CURRENT USE OF INSULIN (HCC): Primary | ICD-10-CM

## 2021-10-05 PROCEDURE — 90674 CCIIV4 VAC NO PRSV 0.5 ML IM: CPT | Performed by: INTERNAL MEDICINE

## 2021-10-05 PROCEDURE — 99214 OFFICE O/P EST MOD 30 MIN: CPT | Performed by: INTERNAL MEDICINE

## 2021-10-05 PROCEDURE — 90471 IMMUNIZATION ADMIN: CPT | Performed by: INTERNAL MEDICINE

## 2021-10-05 PROCEDURE — 3051F HG A1C>EQUAL 7.0%<8.0%: CPT | Performed by: INTERNAL MEDICINE

## 2021-10-05 RX ORDER — DICLOFENAC SODIUM 75 MG/1
75 TABLET, DELAYED RELEASE ORAL 2 TIMES DAILY
Qty: 180 TABLET | Refills: 1 | Status: SHIPPED | OUTPATIENT
Start: 2021-10-05 | End: 2022-05-14 | Stop reason: SDUPTHER

## 2021-10-05 ASSESSMENT — ENCOUNTER SYMPTOMS
ABDOMINAL DISTENTION: 0
ABDOMINAL PAIN: 0
SORE THROAT: 0
TROUBLE SWALLOWING: 0
VOMITING: 0
EYE ITCHING: 0
NAUSEA: 0
WHEEZING: 0
SHORTNESS OF BREATH: 0
EYE DISCHARGE: 0
BLOOD IN STOOL: 0
SINUS PRESSURE: 0
BACK PAIN: 1

## 2021-10-05 NOTE — PROGRESS NOTES
[facility] PHYSICIAN SERVICES  [facility] INTERNAL MEDICINE  [address] [place] [address]  Dept: [phone]  Dept Fax: [phone] 33: [phone]      Visit Date: 10/5/2021    Grzegorz Soler 49 y.o. male who presents today for:  Chief Complaint   Patient presents with    3 Month Follow-Up         HPI:     In for 3-month visit. He is a diabetic with hyperlipidemia and is complaining of some back pain that radiates down into his sciatic area. Has had a history of compression fractures on his lumbar spine and is getting worse. No past medical history on file. Past Surgical History:   Procedure Laterality Date    CYST REMOVAL      TENDON MANIPULATION Right        No family history on file. Social History     Tobacco Use    Smoking status: Former Smoker    Smokeless tobacco: Never Used   Substance Use Topics    Alcohol use: No      Current Outpatient Medications   Medication Sig Dispense Refill    diclofenac (VOLTAREN) 75 MG EC tablet Take 1 tablet by mouth 2 times daily 180 tablet 1    metFORMIN (GLUCOPHAGE) 1000 MG tablet Take 1 tablet by mouth 2 times daily (with meals) 180 tablet 1    diclofenac sodium (VOLTAREN) 1 % GEL Apply 2 g topically 2 times daily 1 g 5    blood glucose monitor strips Test 4 times a day DX E11.9 ONE TOUCH VERIO FLEX 200 strip 5    diclofenac sodium (VOLTAREN) 1 % GEL       tadalafil (CIALIS) 10 MG tablet Take 1 tablet by mouth daily as needed for Erectile Dysfunction 10 tablet 3     No current facility-administered medications for this visit. Allergies   Allergen Reactions    Penicillins Shortness Of Breath    Iodine Nausea Only    Ketorolac Tromethamine Hives and Itching    Sulfa Antibiotics Hives         Subjective:     Review of Systems   Constitutional: Negative for activity change, appetite change, fatigue and fever. HENT: Negative for congestion, hearing loss, sinus pressure, sore throat and trouble swallowing.     Eyes: Negative for discharge and itching. Respiratory: Negative for shortness of breath and wheezing. Cardiovascular: Negative for chest pain, palpitations and leg swelling. Gastrointestinal: Negative for abdominal distention, abdominal pain, blood in stool, nausea and vomiting. Endocrine: Negative for cold intolerance, heat intolerance and polydipsia. Genitourinary: Negative for flank pain, frequency, hematuria and urgency. Musculoskeletal: Positive for arthralgias and back pain. Negative for joint swelling. Skin: Negative for rash and wound. Allergic/Immunologic: Negative for environmental allergies and food allergies. Neurological: Negative for dizziness, tremors, syncope, weakness, numbness and headaches. Hematological: Negative for adenopathy. Psychiatric/Behavioral: Negative for agitation and hallucinations. The patient is not nervous/anxious. Objective:      /78   Pulse 111   Resp 16   Ht 6' 6\" (1.981 m)   Wt (!) 370 lb 12.8 oz (168.2 kg)   SpO2 96%   BMI 42.85 kg/m²    Physical Exam  Constitutional:       General: He is not in acute distress. Appearance: He is well-developed. He is not diaphoretic. HENT:      Head: Normocephalic and atraumatic. Right Ear: External ear normal.      Left Ear: External ear normal.      Nose: Nose normal.      Mouth/Throat:      Pharynx: No oropharyngeal exudate. Eyes:      General: No scleral icterus. Right eye: No discharge. Left eye: No discharge. Conjunctiva/sclera: Conjunctivae normal.      Pupils: Pupils are equal, round, and reactive to light. Neck:      Thyroid: No thyromegaly. Vascular: No JVD. Trachea: No tracheal deviation. Cardiovascular:      Rate and Rhythm: Normal rate and regular rhythm. Heart sounds: Normal heart sounds. No murmur heard. No friction rub. No gallop. Pulmonary:      Effort: Pulmonary effort is normal. No respiratory distress.       Breath sounds: Normal breath sounds. No wheezing or rales. Abdominal:      General: Bowel sounds are normal. There is no distension. Palpations: Abdomen is soft. There is no mass. Tenderness: There is no abdominal tenderness. There is no guarding or rebound. Musculoskeletal:         General: No tenderness or deformity. Normal range of motion. Cervical back: Normal range of motion and neck supple. Lymphadenopathy:      Cervical: No cervical adenopathy. Skin:     General: Skin is warm and dry. Coloration: Skin is not pale. Findings: No erythema or rash. Neurological:      Mental Status: He is alert and oriented to person, place, and time. Cranial Nerves: No cranial nerve deficit. Motor: No abnormal muscle tone. Coordination: Coordination normal.      Deep Tendon Reflexes: Reflexes are normal and symmetric. Reflexes normal.   Psychiatric:         Behavior: Behavior normal.         Thought Content: Thought content normal.         Judgment: Judgment normal.          Assessment:      Diagnosis Orders   1. Type 2 diabetes mellitus without complication, with long-term current use of insulin ([facility])  metFORMIN (GLUCOPHAGE) 1000 MG tablet    Comprehensive Metabolic Panel    Hemoglobin A1C    Lipid Panel   2. Osteoarthritis of ankle, unspecified laterality, unspecified osteoarthritis type  diclofenac (VOLTAREN) 75 MG EC tablet    diclofenac sodium (VOLTAREN) 1 % GEL    Comprehensive Metabolic Panel    Hemoglobin A1C    Lipid Panel   3. Screening for colon cancer  Cologuard (For External Results Only)    Comprehensive Metabolic Panel    Hemoglobin A1C    Lipid Panel   4. Hypercholesteremia  Comprehensive Metabolic Panel    Hemoglobin A1C    Lipid Panel   5. Lumbar radiculopathy  MRI LUMBAR SPINE WO CONTRAST    External Referral To Neurosurgery    Comprehensive Metabolic Panel    Hemoglobin A1C    Lipid Panel            Plan:     2 diabetes mellitus. He is doing well with his medication.  He continues on Metformin 1000 mg twice daily. His hemoglobin A1c when he was last checked in June was 7.8. The present rate is 7.4 so he is doing better. He will continue the same dose of medication. Osteoarthritis which is worsening especially in his lower back. He is on diclofenac. Hypercholesterolemia. His lipids are high his cholesterol is high he can take a statin and his liver enzymes are high and I think it is due to hyperlipidemia and fatty infiltration of the liver. He is going to work harder try to get the cholesterol in the triglycerides down. Persistent lumbar radiculopathy getting worse. He has had a traumatic injury to his back couple years ago and is progressively getting worse. He wants a referral. He needs an MRI of his spine and a referral to Dr. Maria C Rdz. Otherwise he is stable. Is going to work on his diet and we will see him back again in 4 months. Return in about 4 months (around 2/5/2022) for blood pressure check, diabetes check, liver ,lipid check. Patient given educational materials- see patient instructions. Discussed use, benefit, and side effects of prescribedmedications. All patient questions answered. Pt voiced understanding. Reviewedhealth maintenance. Instructed to continue current medications, diet and exercise. Patient agreed with treatment plan. **This report has been created usingvoice recognition software. It may contain minor errors which are inherent in voicerecognition technology. **    Electronically signed by Arturo Lopez MD on 10/5/2021 at 3:31 PM

## 2021-10-22 DIAGNOSIS — M54.16 LUMBAR RADICULOPATHY: ICD-10-CM

## 2021-10-29 DIAGNOSIS — Z12.11 SCREENING FOR COLON CANCER: ICD-10-CM

## 2021-11-12 ENCOUNTER — HOSPITAL ENCOUNTER (OUTPATIENT)
Dept: GENERAL RADIOLOGY | Facility: HOSPITAL | Age: 56
Discharge: HOME | End: 2021-11-12
Admitting: NURSE PRACTITIONER

## 2021-11-12 ENCOUNTER — OFFICE VISIT (OUTPATIENT)
Dept: NEUROSURGERY | Facility: CLINIC | Age: 56
End: 2021-11-12

## 2021-11-12 VITALS — BODY MASS INDEX: 36.45 KG/M2 | HEIGHT: 78 IN | WEIGHT: 315 LBS

## 2021-11-12 DIAGNOSIS — R20.2 NUMBNESS AND TINGLING OF RIGHT LOWER EXTREMITY: ICD-10-CM

## 2021-11-12 DIAGNOSIS — M54.50 LUMBAR BACK PAIN: ICD-10-CM

## 2021-11-12 DIAGNOSIS — M79.604 PAIN OF RIGHT LOWER EXTREMITY: ICD-10-CM

## 2021-11-12 DIAGNOSIS — R20.0 NUMBNESS AND TINGLING OF RIGHT LOWER EXTREMITY: ICD-10-CM

## 2021-11-12 DIAGNOSIS — M54.50 LUMBAR BACK PAIN: Primary | ICD-10-CM

## 2021-11-12 DIAGNOSIS — E66.01 CLASS 3 SEVERE OBESITY DUE TO EXCESS CALORIES WITH BODY MASS INDEX (BMI) OF 40.0 TO 44.9 IN ADULT, UNSPECIFIED WHETHER SERIOUS COMORBIDITY PRESENT (HCC): ICD-10-CM

## 2021-11-12 DIAGNOSIS — Z87.891 HISTORY OF TOBACCO ABUSE: ICD-10-CM

## 2021-11-12 PROCEDURE — 99214 OFFICE O/P EST MOD 30 MIN: CPT | Performed by: NURSE PRACTITIONER

## 2021-11-12 PROCEDURE — 72100 X-RAY EXAM L-S SPINE 2/3 VWS: CPT

## 2021-11-12 RX ORDER — LANCETS 33 GAUGE
1 EACH MISCELLANEOUS DAILY
COMMUNITY
Start: 2021-08-07

## 2021-11-12 RX ORDER — BLOOD SUGAR DIAGNOSTIC
1 STRIP MISCELLANEOUS 4 TIMES DAILY
COMMUNITY
Start: 2021-08-07

## 2021-11-12 RX ORDER — CYCLOBENZAPRINE HCL 10 MG
10 TABLET ORAL 2 TIMES DAILY PRN
Qty: 60 TABLET | Refills: 0 | Status: SHIPPED | OUTPATIENT
Start: 2021-11-12

## 2021-11-12 NOTE — PATIENT INSTRUCTIONS
"https://www.nhlbi.nih.gov/files/docs/public/heart/dash_brief.pdf\">   DASH Eating Plan  DASH stands for Dietary Approaches to Stop Hypertension. The DASH eating plan is a healthy eating plan that has been shown to:  · Reduce high blood pressure (hypertension).  · Reduce your risk for type 2 diabetes, heart disease, and stroke.  · Help with weight loss.  What are tips for following this plan?  Reading food labels  · Check food labels for the amount of salt (sodium) per serving. Choose foods with less than 5 percent of the Daily Value of sodium. Generally, foods with less than 300 milligrams (mg) of sodium per serving fit into this eating plan.  · To find whole grains, look for the word \"whole\" as the first word in the ingredient list.  Shopping  · Buy products labeled as \"low-sodium\" or \"no salt added.\"  · Buy fresh foods. Avoid canned foods and pre-made or frozen meals.  Cooking  · Avoid adding salt when cooking. Use salt-free seasonings or herbs instead of table salt or sea salt. Check with your health care provider or pharmacist before using salt substitutes.  · Do not [place] foods. Cook foods using healthy methods such as baking, boiling, grilling, roasting, and broiling instead.  · Cook with heart-healthy oils, such as olive, canola, avocado, soybean, or sunflower oil.  Meal planning    · Eat a balanced diet that includes:  ? 4 or more servings of fruits and 4 or more servings of vegetables each day. Try to fill one-half of your plate with fruits and vegetables.  ? 6-8 servings of whole grains each day.  ? Less than 6 oz (170 g) of lean meat, poultry, or fish each day. A 3-oz (85-g) serving of meat is about the same size as a deck of cards. One egg equals 1 oz (28 g).  ? 2-3 servings of low-fat dairy each day. One serving is 1 cup (237 mL).  ? 1 serving of nuts, seeds, or beans 5 times each week.  ? 2-3 servings of heart-healthy fats. Healthy fats called omega-3 fatty acids are found in foods such as walnuts, " flaxseeds, fortified milks, and eggs. These fats are also found in cold-water fish, such as sardines, salmon, and mackerel.  · Limit how much you eat of:  ? Canned or prepackaged foods.  ? Food that is high in trans fat, such as some fried foods.  ? Food that is high in saturated fat, such as fatty meat.  ? Desserts and other sweets, sugary drinks, and other foods with added sugar.  ? Full-fat dairy products.  · Do not salt foods before eating.  · Do not eat more than 4 egg yolks a week.  · Try to eat at least 2 vegetarian meals a week.  · Eat more home-cooked food and less restaurant, buffet, and fast food.    Lifestyle  · When eating at a restaurant, ask that your food be prepared with less salt or no salt, if possible.  · If you drink alcohol:  ? Limit how much you use to:  § 0-1 drink a day for women who are not pregnant.  § 0-2 drinks a day for men.  ? Be aware of how much alcohol is in your drink. In the [place] one drink equals one 12 oz bottle of beer (355 mL), one 5 oz glass of wine (148 mL), or one 1½ oz glass of hard liquor (44 mL).  General information  · Avoid eating more than 2,300 mg of salt a day. If you have hypertension, you may need to reduce your sodium intake to 1,500 mg a day.  · Work with your health care provider to maintain a healthy body weight or to lose weight. Ask what an ideal weight is for you.  · Get at least 30 minutes of exercise that causes your heart to beat faster (aerobic exercise) most days of the week. Activities may include walking, swimming, or biking.  · Work with your health care provider or dietitian to adjust your eating plan to your individual calorie needs.  What foods should I eat?  Fruits  All fresh, dried, or frozen fruit. Canned fruit in natural juice (without added sugar).  Vegetables  Fresh or frozen vegetables (raw, steamed, roasted, or grilled). Low-sodium or reduced-sodium tomato and vegetable juice. Low-sodium or reduced-sodium tomato sauce and tomato paste.  Low-sodium or reduced-sodium canned vegetables.  Grains  Whole-grain or whole-wheat bread. Whole-grain or whole-wheat pasta. Brown rice. Oatmeal. Quinoa. Bulgur. Whole-grain and low-sodium cereals. Zeny bread. Low-fat, low-sodium crackers. Whole-wheat flour tortillas.  Meats and other proteins  Skinless chicken or turkey. Ground chicken or turkey. Pork with fat trimmed off. Fish and seafood. Egg whites. Dried beans, peas, or lentils. Unsalted nuts, nut butters, and seeds. Unsalted canned beans. Lean cuts of beef with fat trimmed off. Low-sodium, lean precooked or cured meat, such as sausages or meat loaves.  Dairy  Low-fat (1%) or fat-free (skim) milk. Reduced-fat, low-fat, or fat-free cheeses. Nonfat, low-sodium ricotta or cottage cheese. Low-fat or nonfat yogurt. Low-fat, low-sodium cheese.  Fats and oils  Soft margarine without trans fats. Vegetable oil. Reduced-fat, low-fat, or light mayonnaise and salad dressings (reduced-sodium). Canola, safflower, olive, avocado, soybean, and sunflower oils. Avocado.  Seasonings and condiments  Herbs. Spices. Seasoning mixes without salt.  Other foods  Unsalted popcorn and pretzels. Fat-free sweets.  The items listed above may not be a complete list of foods and beverages you can eat. Contact a dietitian for more information.  What foods should I avoid?  Fruits  Canned fruit in a light or heavy syrup. Fried fruit. Fruit in cream or butter sauce.  Vegetables  Creamed or fried vegetables. Vegetables in a cheese sauce. Regular canned vegetables (not low-sodium or reduced-sodium). Regular canned tomato sauce and paste (not low-sodium or reduced-sodium). Regular tomato and vegetable juice (not low-sodium or reduced-sodium). Pickles. Olives.  Grains  Baked goods made with fat, such as croissants, muffins, or some breads. Dry pasta or rice meal packs.  Meats and other proteins  Fatty cuts of meat. Ribs. Fried meat. Flores. Bologna, salami, and other precooked or cured meats, such as  sausages or meat loaves. Fat from the back of a pig (fatback). Bratwurst. Salted nuts and seeds. Canned beans with added salt. Canned or smoked fish. Whole eggs or egg yolks. Chicken or turkey with skin.  Dairy  Whole or 2% milk, cream, and half-and-half. Whole or full-fat cream cheese. Whole-fat or sweetened yogurt. Full-fat cheese. Nondairy creamers. Whipped toppings. Processed cheese and cheese spreads.  Fats and oils  Butter. Stick margarine. Lard. Shortening. Ghee. Flores fat. Tropical oils, such as coconut, palm kernel, or palm oil.  Seasonings and condiments  Onion salt, garlic salt, seasoned salt, table salt, and sea salt. Worcestershire sauce. Tartar sauce. Barbecue sauce. Teriyaki sauce. Soy sauce, including reduced-sodium. Steak sauce. Canned and packaged gravies. Fish sauce. Oyster sauce. Cocktail sauce. Store-bought horseradish. Ketchup. Mustard. Meat flavorings and tenderizers. Bouillon cubes. Hot sauces. Pre-made or packaged marinades. Pre-made or packaged taco seasonings. Relishes. Regular salad dressings.  Other foods  Salted popcorn and pretzels.  The items listed above may not be a complete list of foods and beverages you should avoid. Contact a dietitian for more information.  Where to find more information  · National Heart, Lung, and Blood [place]: www.nhlbi.nih.gov  · American Heart Association: www.heart.org  · Academy of Nutrition and Dietetics: www.eatright.org  · National Kidney Foundation: www.kidney.org  Summary  · The DASH eating plan is a healthy eating plan that has been shown to reduce high blood pressure (hypertension). It may also reduce your risk for type 2 diabetes, heart disease, and stroke.  · When on the DASH eating plan, aim to eat more fresh fruits and vegetables, whole grains, lean proteins, low-fat dairy, and heart-healthy fats.  · With the DASH eating plan, you should limit salt (sodium) intake to 2,300 mg a day. If you have hypertension, you may need to reduce your  sodium intake to 1,500 mg a day.  · Work with your health care provider or dietitian to adjust your eating plan to your individual calorie needs.  This information is not intended to replace advice given to you by your health care provider. Make sure you discuss any questions you have with your health care provider.  Document Revised: 11/20/2020 Document Reviewed: 11/20/2020  [employer] Patient Education © 2021 [employer]      Tobacco Use Disorder  Tobacco use disorder (TUD) occurs when a person craves, seeks, and uses tobacco, regardless of the consequences. This disorder can cause problems with mental and physical health. It can affect your ability to have healthy relationships, and it can keep you from meeting your responsibilities at work, home, or school.  Tobacco may be:  · Smoked as a cigarette or cigar.  · Inhaled using e-cigarettes.  · Smoked in a pipe or hookah.  · Chewed as smokeless tobacco.  · Inhaled into the nostrils as snuff.  Tobacco products contain a dangerous chemical called nicotine, which is very addictive. Nicotine triggers hormones that make the body feel stimulated and works on areas of the brain that make you feel good. These effects can make it hard for people to quit nicotine.  Tobacco contains many other unsafe chemicals that can damage almost every organ in the body. Smoking tobacco also puts others in danger due to fire risk and possible health problems caused by breathing in secondhand smoke.  What are the signs or symptoms?  Symptoms of TUD may include:  · Being unable to slow down or stop your tobacco use.  · Spending an abnormal amount of time getting or using tobacco.  · Craving tobacco. Cravings may last for up to 6 months after quitting.  · Tobacco use that:  ? Interferes with your work, school, or home life.  ? Interferes with your personal and social relationships.  ? Makes you give up activities that you once enjoyed or found important.  · Using tobacco even though you know  that it is:  ? Dangerous or bad for your health or someone else's health.  ? Causing problems in your life.  · Needing more and more of the substance to get the same effect (developing tolerance).  · Experiencing unpleasant symptoms if you do not use the substance (withdrawal). Withdrawal symptoms may include:  ? Depressed, anxious, or irritable mood.  ? Difficulty concentrating.  ? Increased appetite.  ? Restlessness or trouble sleeping.  · Using the substance to avoid withdrawal.  How is this diagnosed?  This condition may be diagnosed based on:  · Your current and past tobacco use. Your health care provider may ask questions about how your tobacco use affects your life.  · A physical exam.  You may be diagnosed with TUD if you have at least two symptoms within a 12-month period.  How is this treated?  This condition is treated by stopping tobacco use. Many people are unable to quit on their own and need help. Treatment may include:  · Nicotine replacement therapy (NRT). NRT provides nicotine without the other harmful chemicals in tobacco. NRT gradually lowers the dosage of nicotine in the body and reduces withdrawal symptoms. NRT is available as:  ? Over-the-counter gums, lozenges, and skin patches.  ? Prescription mouth inhalers and nasal sprays.  · Medicine that acts on the brain to reduce cravings and withdrawal symptoms.  · A type of talk therapy that examines your triggers for tobacco use, how to avoid them, and how to cope with cravings (behavioral therapy).  · Hypnosis. This may help with withdrawal symptoms.  · Joining a support group for others coping with TUD.  The best treatment for TUD is usually a combination of medicine, talk therapy, and support groups. Recovery can be a long process. Many people start using tobacco again after stopping (relapse). If you relapse, it does not mean that treatment will not work.  Follow these instructions at home:    Lifestyle  · Do not use any products that contain  nicotine or tobacco, such as cigarettes and e-cigarettes.  · Avoid things that trigger tobacco use as much as you can. Triggers include people and situations that usually cause you to use tobacco.  · Avoid drinks that contain caffeine, including coffee. These may worsen some withdrawal symptoms.  · Find ways to manage stress. Wanting to smoke may cause stress, and stress can make you want to smoke. Relaxation techniques such as deep breathing, meditation, and yoga may help.  · Attend support groups as needed. These groups are an important part of long-term recovery for many people.  General instructions  · Take over-the-counter and prescription medicines only as told by your health care provider.  · Check with your health care provider before taking any new prescription or over-the-counter medicines.  · Decide on a friend, family member, or smoking quit-line (such as 1-800-QUIT-NOW in the [place] that you can call or text when you feel the urge to smoke or when you need help coping with cravings.  · Keep all follow-up visits as told by your health care provider and therapist. This is important.  Contact a health care provider if:  · You are not able to take your medicines as prescribed.  · Your symptoms get worse, even with treatment.  Summary  · Tobacco use disorder (TUD) occurs when a person craves, seeks, and uses tobacco regardless of the consequences.  · This condition may be diagnosed based on your current and past tobacco use and a physical exam.  · Many people are unable to quit on their own and need help. Recovery can be a long process.  · The most effective treatment for TUD is usually a combination of medicine, talk therapy, and support groups.  This information is not intended to replace advice given to you by your health care provider. Make sure you discuss any questions you have with your health care provider.  Document Revised: 12/05/2018 Document Reviewed: 12/05/2018  [employer] Patient Education © 2021  [employer]
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
## 2021-12-07 ENCOUNTER — APPOINTMENT (OUTPATIENT)
Dept: NEUROLOGY | Facility: HOSPITAL | Age: 56
End: 2021-12-07

## 2022-01-03 ENCOUNTER — PATIENT MESSAGE (OUTPATIENT)
Dept: INTERNAL MEDICINE | Age: 57
End: 2022-01-03

## 2022-01-03 RX ORDER — TADALAFIL 10 MG/1
10 TABLET ORAL DAILY PRN
Qty: 10 TABLET | Refills: 3 | Status: SHIPPED | OUTPATIENT
Start: 2022-01-03

## 2022-01-03 NOTE — TELEPHONE ENCOUNTER
From: Renaldo Mansfield  To: Dr. Soco Rodriguez: 1/3/2022 8:42 AM CST  Subject: Tadalafil 10mg refill    My prescription has , and can't do an automatic refill, could I get a new one?  I would like to have 10 pills, and have them sent to [address] [place] [address] in [place], [address] [place] [address] [address]

## 2022-01-03 NOTE — TELEPHONE ENCOUNTER
Lizeth Coelho called requesting a refill of the below medication which has been pended for you:     Requested Prescriptions     Pending Prescriptions Disp Refills    tadalafil (CIALIS) 10 MG tablet 10 tablet 3     Sig: Take 1 tablet by mouth daily as needed for Erectile Dysfunction       Last Appointment Date: 10/5/2021  Next Appointment Date: 2/8/2022    Allergies   Allergen Reactions    Penicillins Shortness Of Breath    Iodine Nausea Only    Ketorolac Tromethamine Hives and Itching    Sulfa Antibiotics Hives

## 2022-01-04 ENCOUNTER — HOSPITAL ENCOUNTER (OUTPATIENT)
Dept: NEUROLOGY | Facility: HOSPITAL | Age: 57
Discharge: HOME | End: 2022-01-04
Admitting: NURSE PRACTITIONER

## 2022-01-04 DIAGNOSIS — R20.2 NUMBNESS AND TINGLING OF RIGHT LOWER EXTREMITY: ICD-10-CM

## 2022-01-04 DIAGNOSIS — M79.604 PAIN OF RIGHT LOWER EXTREMITY: ICD-10-CM

## 2022-01-04 DIAGNOSIS — R20.0 NUMBNESS AND TINGLING OF RIGHT LOWER EXTREMITY: ICD-10-CM

## 2022-01-04 PROCEDURE — 95886 MUSC TEST DONE W/N TEST COMP: CPT

## 2022-01-04 PROCEDURE — 95909 NRV CNDJ TST 5-6 STUDIES: CPT

## 2022-01-12 ENCOUNTER — OFFICE VISIT (OUTPATIENT)
Dept: NEUROSURGERY | Facility: CLINIC | Age: 57
End: 2022-01-12

## 2022-01-12 VITALS — HEIGHT: 78 IN | WEIGHT: 315 LBS | BODY MASS INDEX: 36.45 KG/M2

## 2022-01-12 DIAGNOSIS — E66.01 MORBID OBESITY: ICD-10-CM

## 2022-01-12 DIAGNOSIS — M54.16 LUMBAR RADICULOPATHY: Primary | ICD-10-CM

## 2022-01-12 PROCEDURE — 99214 OFFICE O/P EST MOD 30 MIN: CPT | Performed by: NEUROLOGICAL SURGERY

## 2022-01-12 RX ORDER — TADALAFIL 10 MG/1
1 TABLET ORAL DAILY PRN
COMMUNITY
Start: 2022-01-03
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
## 2022-01-13 RX ORDER — GABAPENTIN 300 MG/1
300 CAPSULE ORAL 3 TIMES DAILY
Qty: 90 CAPSULE | Refills: 0 | Status: SHIPPED | OUTPATIENT
Start: 2022-01-13 | End: 2022-01-28

## 2022-01-24 DIAGNOSIS — M54.50 LUMBAR BACK PAIN: ICD-10-CM

## 2022-01-24 DIAGNOSIS — M54.16 LUMBAR RADICULOPATHY: Primary | ICD-10-CM

## 2022-01-24 NOTE — TELEPHONE ENCOUNTER
Patient calling in, states he cannot tolerate the gabapentin due to side effects. Can you recommend anything else? Also he wanted to check status of referral and asked who he was being referred to for pain management. He states he does not want to see Dr Kulkarni and prefers [facility] pain management. Advised I would get with Dr Weaver and see if he had any recommendation on the gabapentin.

## 2022-01-28 RX ORDER — PREGABALIN 50 MG/1
50 CAPSULE ORAL 3 TIMES DAILY
Qty: 42 CAPSULE | Refills: 0 | Status: SHIPPED | OUTPATIENT
Start: 2022-01-28 | End: 2022-02-11

## 2022-01-28 RX ORDER — PREGABALIN 100 MG/1
100 CAPSULE ORAL 2 TIMES DAILY
Qty: 60 CAPSULE | Refills: 0 | OUTPATIENT
Start: 2022-01-28

## 2022-01-28 NOTE — TELEPHONE ENCOUNTER
Please call and notify patient      Discontinue gabapentin.  The prescription for a 2-week trial of Lyrica has been sent to his pharmacy.  Please advise he call the neurosurgical clinic prior to running out of this medication and inform us of its efficacy.  If it is controlling his pain we can continue current dose or we can increase dose accordingly.    Thank you,    MIKE Darby

## 2022-02-08 DIAGNOSIS — M19.079 OSTEOARTHRITIS OF ANKLE, UNSPECIFIED LATERALITY, UNSPECIFIED OSTEOARTHRITIS TYPE: ICD-10-CM

## 2022-02-08 DIAGNOSIS — Z12.11 SCREENING FOR COLON CANCER: ICD-10-CM

## 2022-02-08 DIAGNOSIS — Z79.4 TYPE 2 DIABETES MELLITUS WITHOUT COMPLICATION, WITH LONG-TERM CURRENT USE OF INSULIN (HCC): ICD-10-CM

## 2022-02-08 DIAGNOSIS — E78.00 HYPERCHOLESTEREMIA: ICD-10-CM

## 2022-02-08 DIAGNOSIS — E11.9 TYPE 2 DIABETES MELLITUS WITHOUT COMPLICATION, WITH LONG-TERM CURRENT USE OF INSULIN (HCC): ICD-10-CM

## 2022-02-08 DIAGNOSIS — M54.16 LUMBAR RADICULOPATHY: ICD-10-CM

## 2022-02-08 LAB
ALBUMIN SERPL-MCNC: 4.5 G/DL (ref 3.5–5.2)
ALP BLD-CCNC: 51 U/L (ref 40–130)
ALT SERPL-CCNC: 78 U/L (ref 5–41)
ANION GAP SERPL CALCULATED.3IONS-SCNC: 11 MMOL/L (ref 7–19)
AST SERPL-CCNC: 53 U/L (ref 5–40)
BILIRUB SERPL-MCNC: 0.4 MG/DL (ref 0.2–1.2)
BUN BLDV-MCNC: 15 MG/DL (ref 6–20)
CALCIUM SERPL-MCNC: 9.7 MG/DL (ref 8.6–10)
CHLORIDE BLD-SCNC: 100 MMOL/L (ref 98–111)
CHOLESTEROL, TOTAL: 224 MG/DL (ref 160–199)
CO2: 26 MMOL/L (ref 22–29)
CREAT SERPL-MCNC: 0.9 MG/DL (ref 0.5–1.2)
GFR AFRICAN AMERICAN: >59
GFR NON-AFRICAN AMERICAN: >60
GLUCOSE BLD-MCNC: 139 MG/DL (ref 74–109)
HBA1C MFR BLD: 7.8 % (ref 4–6)
HDLC SERPL-MCNC: 42 MG/DL (ref 55–121)
LDL CHOLESTEROL CALCULATED: 147 MG/DL
POTASSIUM SERPL-SCNC: 4.4 MMOL/L (ref 3.5–5)
SODIUM BLD-SCNC: 137 MMOL/L (ref 136–145)
TOTAL PROTEIN: 7.1 G/DL (ref 6.6–8.7)
TRIGL SERPL-MCNC: 177 MG/DL (ref 0–149)

## 2022-02-15 ENCOUNTER — OFFICE VISIT (OUTPATIENT)
Dept: INTERNAL MEDICINE | Age: 57
End: 2022-02-15
Payer: COMMERCIAL

## 2022-02-15 VITALS
HEART RATE: 62 BPM | WEIGHT: 315 LBS | DIASTOLIC BLOOD PRESSURE: 80 MMHG | HEIGHT: 78 IN | BODY MASS INDEX: 36.45 KG/M2 | OXYGEN SATURATION: 96 % | SYSTOLIC BLOOD PRESSURE: 160 MMHG

## 2022-02-15 DIAGNOSIS — E78.00 HYPERCHOLESTEREMIA: ICD-10-CM

## 2022-02-15 DIAGNOSIS — E11.9 TYPE 2 DIABETES MELLITUS WITHOUT COMPLICATION, WITH LONG-TERM CURRENT USE OF INSULIN (HCC): ICD-10-CM

## 2022-02-15 DIAGNOSIS — Z12.11 COLON CANCER SCREENING: Primary | ICD-10-CM

## 2022-02-15 DIAGNOSIS — M54.16 LUMBAR RADICULOPATHY: ICD-10-CM

## 2022-02-15 DIAGNOSIS — Z79.4 TYPE 2 DIABETES MELLITUS WITHOUT COMPLICATION, WITH LONG-TERM CURRENT USE OF INSULIN (HCC): ICD-10-CM

## 2022-02-15 PROCEDURE — 99214 OFFICE O/P EST MOD 30 MIN: CPT | Performed by: INTERNAL MEDICINE

## 2022-02-15 PROCEDURE — 3051F HG A1C>EQUAL 7.0%<8.0%: CPT | Performed by: INTERNAL MEDICINE

## 2022-02-15 ASSESSMENT — ENCOUNTER SYMPTOMS
NAUSEA: 0
ABDOMINAL PAIN: 0
SINUS PRESSURE: 0
BACK PAIN: 0
SORE THROAT: 0
BLOOD IN STOOL: 0
EYE ITCHING: 0
WHEEZING: 0
EYE DISCHARGE: 0
SHORTNESS OF BREATH: 0
VOMITING: 0
ABDOMINAL DISTENTION: 0
TROUBLE SWALLOWING: 0

## 2022-02-15 NOTE — PROGRESS NOTES
[address] [facility]  [address] [place] [address]  [address] [address]  Dept: [phone]  Dept Fax: [phone]: [phone]      Visit Date: 2/15/2022    Toan Holden a 49 y.o. male who presents today for:  Chief Complaint   Patient presents with    Follow-up         HPI:     Is in for 4-month visit on his diabetes his cholesterol. He is doing well other than his back. He is good to see the neurosurgeon and they are referring him to pain management for injections for his lumbar stenosis. History reviewed. No pertinent past medical history. Past Surgical History:   Procedure Laterality Date    CYST REMOVAL      TENDON MANIPULATION Right        No family history on file. Social History     Tobacco Use    Smoking status: Former Smoker     Packs/day: 0.25     Years: 19.00     Pack years: 4.75     Quit date: 2012     Years since quitting: 10.1    Smokeless tobacco: Never Used   Substance Use Topics    Alcohol use: No      Current Outpatient Medications   Medication Sig Dispense Refill    tadalafil (CIALIS) 10 MG tablet Take 1 tablet by mouth daily as needed for Erectile Dysfunction 10 tablet 3    mometasone (NASONEX) 50 MCG/ACT nasal spray 2 sprays by Nasal route daily 1 each 3    diclofenac (VOLTAREN) 75 MG EC tablet Take 1 tablet by mouth 2 times daily 180 tablet 1    metFORMIN (GLUCOPHAGE) 1000 MG tablet Take 1 tablet by mouth 2 times daily (with meals) 180 tablet 1    diclofenac sodium (VOLTAREN) 1 % GEL Apply 2 g topically 2 times daily 1 g 5    blood glucose monitor strips Test 4 times a day DX E11.9 ONE TOUCH VERIO FLEX 200 strip 5     No current facility-administered medications for this visit.      Allergies   Allergen Reactions    Penicillins Shortness Of Breath    Iodine Nausea Only    Ketorolac Tromethamine Hives and Itching    Lyrica [Pregabalin]     Sulfa Antibiotics Hives         Subjective:     Review of Systems Constitutional: Negative for activity change, appetite change, fatigue and fever. HENT: Negative for congestion, hearing loss, sinus pressure, sore throat and trouble swallowing. Eyes: Negative for discharge and itching. Respiratory: Negative for shortness of breath and wheezing. Cardiovascular: Negative for chest pain, palpitations and leg swelling. Gastrointestinal: Negative for abdominal distention, abdominal pain, blood in stool, nausea and vomiting. Endocrine: Negative for cold intolerance, heat intolerance and polydipsia. Genitourinary: Negative for flank pain, frequency, hematuria and urgency. Musculoskeletal: Negative for arthralgias, back pain and joint swelling. Skin: Negative for rash and wound. Allergic/Immunologic: Negative for environmental allergies and food allergies. Neurological: Negative for dizziness, tremors, syncope, weakness, numbness and headaches. Hematological: Negative for adenopathy. Psychiatric/Behavioral: Negative for agitation and hallucinations. The patient is not nervous/anxious. Objective:      BP (!) 160/80 (Site: Left Upper Arm, Position: Sitting, Cuff Size: Medium Adult)   Pulse 62   Ht 6' 7\" (2.007 m)   Wt (!) 374 lb (169.6 kg)   SpO2 96%   BMI 42.13 kg/m²    Physical Exam  Constitutional:       General: He is not in acute distress. Appearance: He is well-developed. He is not diaphoretic. HENT:      Head: Normocephalic and atraumatic. Right Ear: External ear normal.      Left Ear: External ear normal.      Nose: Nose normal.      Mouth/Throat:      Pharynx: No oropharyngeal exudate. Eyes:      General: No scleral icterus. Right eye: No discharge. Left eye: No discharge. Conjunctiva/sclera: Conjunctivae normal.      Pupils: Pupils are equal, round, and reactive to light. Neck:      Thyroid: No thyromegaly. Vascular: No JVD. Trachea: No tracheal deviation.    Cardiovascular:      Rate and Rhythm: Normal rate and regular rhythm. Heart sounds: Normal heart sounds. No murmur heard. No friction rub. No gallop. Pulmonary:      Effort: Pulmonary effort is normal. No respiratory distress. Breath sounds: Normal breath sounds. No wheezing or rales. Abdominal:      General: Bowel sounds are normal. There is no distension. Palpations: Abdomen is soft. There is no mass. Tenderness: There is no abdominal tenderness. There is no guarding or rebound. Musculoskeletal:         General: No tenderness or deformity. Normal range of motion. Cervical back: Normal range of motion and neck supple. Lymphadenopathy:      Cervical: No cervical adenopathy. Skin:     General: Skin is warm and dry. Coloration: Skin is not pale. Findings: No erythema or rash. Neurological:      Mental Status: He is alert and oriented to person, place, and time. Cranial Nerves: No cranial nerve deficit. Motor: No abnormal muscle tone. Coordination: Coordination normal.      Deep Tendon Reflexes: Reflexes are normal and symmetric. Reflexes normal.   Psychiatric:         Behavior: Behavior normal.         Thought Content: Thought content normal.         Judgment: Judgment normal.          Assessment:      Diagnosis Orders   1. Colon cancer screening  Bill Blackmon MD, Gastroenterology, [place]    Comprehensive Metabolic Panel    Hemoglobin A1C    Lipid Panel   2. Type 2 diabetes mellitus without complication, with long-term current use of insulin (HCC)  Comprehensive Metabolic Panel    Hemoglobin A1C    Lipid Panel   3. Hypercholesteremia  Comprehensive Metabolic Panel    Hemoglobin A1C    Lipid Panel   4. Lumbar radiculopathy  Comprehensive Metabolic Panel    Hemoglobin A1C    Lipid Panel            Plan:     2 diabetes mellitus. He is on Metformin at 1000 mg twice daily. His fasting sugar is 139. His hemoglobin A1c went from 7.4 up to 7.8. He is going to try to do better.   He is been indulging more in through the holidays than he normally does. Hypercholesterol. His cholesterol shows that his try to cholesterol total is down his triglycerides are down his LDL is down he is trying to exercise more and continue to watch his diet. He is not on a steroid statin as he is not able to tolerate them due to myalgias. Lumbar radiculopathy. He is under the care of [address] [place] [address] neurosurgically who told him he does not really think he can do surgery out of that helping and he referred him to pain management. He is getting ready to see the orthopedic [place] pain management physician for injections to see if that will help. They tried on gabapentin and he could not tolerate it and he had a allergic reaction to Lyrica. He otherwise is stable. We will see him back in 4 months and recheck his blood. Return in about 4 months (around 6/15/2022) for diabetes check. Patient given educational materials- see patient instructions. Discussed use, benefit, and side effects of prescribedmedications. All patient questions answered. Pt voiced understanding. Reviewedhealth maintenance. Instructed to continue current medications, diet and exercise. Patient agreed with treatment plan. **This report has been created usingvoice recognition software. It may contain minor errors which are inherent in voicerecognition technology. **    Electronically signed by Thelma Fung MD on 2/15/2022 at 4:18 PM

## 2022-03-10 DIAGNOSIS — Z11.52 ENCOUNTER FOR SCREENING FOR COVID-19: Primary | ICD-10-CM

## 2022-04-15 ENCOUNTER — HOSPITAL ENCOUNTER (OUTPATIENT)
Age: 57
Setting detail: OUTPATIENT SURGERY
Discharge: HOME | End: 2022-04-15
Attending: INTERNAL MEDICINE | Admitting: INTERNAL MEDICINE
Payer: COMMERCIAL

## 2022-04-15 ENCOUNTER — ANESTHESIA (OUTPATIENT)
Dept: ENDOSCOPY | Age: 57
End: 2022-04-15
Payer: COMMERCIAL

## 2022-04-15 ENCOUNTER — ANESTHESIA EVENT (OUTPATIENT)
Dept: ENDOSCOPY | Age: 57
End: 2022-04-15
Payer: COMMERCIAL

## 2022-04-15 VITALS
TEMPERATURE: 97.7 F | WEIGHT: 315 LBS | RESPIRATION RATE: 18 BRPM | OXYGEN SATURATION: 97 % | HEART RATE: 57 BPM | SYSTOLIC BLOOD PRESSURE: 137 MMHG | BODY MASS INDEX: 36.45 KG/M2 | HEIGHT: 78 IN | DIASTOLIC BLOOD PRESSURE: 77 MMHG

## 2022-04-15 VITALS — TEMPERATURE: 97 F | SYSTOLIC BLOOD PRESSURE: 135 MMHG | DIASTOLIC BLOOD PRESSURE: 87 MMHG | OXYGEN SATURATION: 94 %

## 2022-04-15 LAB
GLUCOSE BLD-MCNC: 136 MG/DL (ref 70–99)
PERFORMED ON: ABNORMAL

## 2022-04-15 PROCEDURE — 2500000003 HC RX 250 WO HCPCS

## 2022-04-15 PROCEDURE — 2709999900 HC NON-CHARGEABLE SUPPLY: Performed by: INTERNAL MEDICINE

## 2022-04-15 PROCEDURE — 3609010600 HC COLONOSCOPY POLYPECTOMY SNARE/COLD BIOPSY: Performed by: INTERNAL MEDICINE

## 2022-04-15 PROCEDURE — 2580000003 HC RX 258: Performed by: INTERNAL MEDICINE

## 2022-04-15 PROCEDURE — 88305 TISSUE EXAM BY PATHOLOGIST: CPT

## 2022-04-15 PROCEDURE — 82947 ASSAY GLUCOSE BLOOD QUANT: CPT

## 2022-04-15 PROCEDURE — 7100000010 HC PHASE II RECOVERY - FIRST 15 MIN: Performed by: INTERNAL MEDICINE

## 2022-04-15 PROCEDURE — 7100000011 HC PHASE II RECOVERY - ADDTL 15 MIN: Performed by: INTERNAL MEDICINE

## 2022-04-15 PROCEDURE — 45380 COLONOSCOPY AND BIOPSY: CPT | Performed by: INTERNAL MEDICINE

## 2022-04-15 PROCEDURE — 3700000000 HC ANESTHESIA ATTENDED CARE: Performed by: INTERNAL MEDICINE

## 2022-04-15 PROCEDURE — 6360000002 HC RX W HCPCS

## 2022-04-15 PROCEDURE — 45385 COLONOSCOPY W/LESION REMOVAL: CPT | Performed by: INTERNAL MEDICINE

## 2022-04-15 PROCEDURE — 3700000001 HC ADD 15 MINUTES (ANESTHESIA): Performed by: INTERNAL MEDICINE

## 2022-04-15 RX ORDER — SODIUM CHLORIDE, SODIUM LACTATE, POTASSIUM CHLORIDE, CALCIUM CHLORIDE 600; 310; 30; 20 MG/100ML; MG/100ML; MG/100ML; MG/100ML
INJECTION, SOLUTION INTRAVENOUS CONTINUOUS
Status: DISCONTINUED | OUTPATIENT
Start: 2022-04-15 | End: 2022-04-15 | Stop reason: HOSPADM

## 2022-04-15 RX ORDER — LIDOCAINE HYDROCHLORIDE 10 MG/ML
INJECTION, SOLUTION EPIDURAL; INFILTRATION; INTRACAUDAL; PERINEURAL PRN
Status: DISCONTINUED | OUTPATIENT
Start: 2022-04-15 | End: 2022-04-15 | Stop reason: SDUPTHER

## 2022-04-15 RX ORDER — PROPOFOL 10 MG/ML
INJECTION, EMULSION INTRAVENOUS PRN
Status: DISCONTINUED | OUTPATIENT
Start: 2022-04-15 | End: 2022-04-15 | Stop reason: SDUPTHER

## 2022-04-15 RX ADMIN — SODIUM CHLORIDE, POTASSIUM CHLORIDE, SODIUM LACTATE AND CALCIUM CHLORIDE: 600; 310; 30; 20 INJECTION, SOLUTION INTRAVENOUS at 08:56

## 2022-04-15 RX ADMIN — PROPOFOL 720 MG: 10 INJECTION, EMULSION INTRAVENOUS at 10:53

## 2022-04-15 RX ADMIN — LIDOCAINE HYDROCHLORIDE 100 MG: 10 INJECTION, SOLUTION EPIDURAL; INFILTRATION; INTRACAUDAL; PERINEURAL at 10:53

## 2022-04-15 ASSESSMENT — PAIN - FUNCTIONAL ASSESSMENT: PAIN_FUNCTIONAL_ASSESSMENT: 0-10

## 2022-04-15 ASSESSMENT — PAIN SCALES - GENERAL
PAINLEVEL_OUTOF10: 0
PAINLEVEL_OUTOF10: 0

## 2022-04-15 NOTE — ANESTHESIA PRE PROCEDURE
Department of Anesthesiology  Preprocedure Note       Name:  Marisol Bailey   Age:  49 y.o.  :  1965                                          MRN:  [id number]         Date:  4/15/2022      Surgeon: James Wray):  Jane Giles MD    Procedure: Procedure(s):  COLORECTAL CANCER SCREENING, NOT HIGH RISK    Medications prior to admission:   Prior to Admission medications    Medication Sig Start Date End Date Taking? Authorizing Provider   tadalafil (CIALIS) 10 MG tablet Take 1 tablet by mouth daily as needed for Erectile Dysfunction 1/3/22   Natividad Drake MD   mometasone (NASONEX) 50 MCG/ACT nasal spray 2 sprays by Nasal route daily 10/18/21   Natividad Drake MD   diclofenac (VOLTAREN) 75 MG EC tablet Take 1 tablet by mouth 2 times daily 10/5/21   Natividad Drake MD   metFORMIN (GLUCOPHAGE) 1000 MG tablet Take 1 tablet by mouth 2 times daily (with meals) 10/5/21 4/3/22  Natividad Drake MD   diclofenac sodium (VOLTAREN) 1 % GEL Apply 2 g topically 2 times daily 10/5/21 2/15/22  Natividad Drake MD   blood glucose monitor strips Test 4 times a day DX E11.9 ONE TOUCH VERIO FLEX 5/3/21   Natividad Drake MD       Current medications:    No current facility-administered medications for this encounter. Current Outpatient Medications   Medication Sig Dispense Refill    tadalafil (CIALIS) 10 MG tablet Take 1 tablet by mouth daily as needed for Erectile Dysfunction 10 tablet 3    mometasone (NASONEX) 50 MCG/ACT nasal spray 2 sprays by Nasal route daily 1 each 3    diclofenac (VOLTAREN) 75 MG EC tablet Take 1 tablet by mouth 2 times daily 180 tablet 1    metFORMIN (GLUCOPHAGE) 1000 MG tablet Take 1 tablet by mouth 2 times daily (with meals) 180 tablet 1    diclofenac sodium (VOLTAREN) 1 % GEL Apply 2 g topically 2 times daily 1 g 5    blood glucose monitor strips Test 4 times a day DX E11.9 ONE TOUCH VERIO FLEX 200 strip 5       Allergies:     Allergies   Allergen Reactions    Penicillins Shortness Of Breath    Iodine Nausea Only    Ketorolac Tromethamine Hives and Itching    Lyrica [Pregabalin]     Sulfa Antibiotics Hives       Problem List:    Patient Active Problem List   Diagnosis Code    Morbid obesity due to excess calories ([facility]) E66.01    Hypercholesteremia E78.00    Osteoarthritis of ankle M19.079    Type 2 diabetes mellitus ([facility]) E11.9    Acute non-recurrent pansinusitis J01.40    Lumbar radiculopathy M54.16       Past Medical History:  No past medical history on file. Past Surgical History:        Procedure Laterality Date    CYST REMOVAL      TENDON MANIPULATION Right        Social History:    Social History     Tobacco Use    Smoking status: Former Smoker     Packs/day: 0.25     Years: 19.00     Pack years: 4.75     Quit date: 2012     Years since quitting: 10.2    Smokeless tobacco: Never Used   Substance Use Topics    Alcohol use: No                                Counseling given: Not Answered      Vital Signs (Current): There were no vitals filed for this visit.                                            BP Readings from Last 3 Encounters:   02/15/22 (!) 160/80   10/05/21 136/78   06/07/21 138/84       NPO Status:                                                                                 BMI:   Wt Readings from Last 3 Encounters:   02/15/22 (!) 374 lb (169.6 kg)   10/05/21 (!) 370 lb 12.8 oz (168.2 kg)   06/07/21 (!) 366 lb (166 kg)     There is no height or weight on file to calculate BMI.    CBC:   Lab Results   Component Value Date    WBC 9.2 03/31/2021    RBC 5.41 03/31/2021    HGB 15.3 03/31/2021    HCT 48.5 03/31/2021    MCV 89.6 03/31/2021    RDW 13.7 03/31/2021     03/31/2021       CMP:   Lab Results   Component Value Date     02/08/2022    K 4.4 02/08/2022     02/08/2022    CO2 26 02/08/2022    BUN 15 02/08/2022    CREATININE 0.9 02/08/2022    GFRAA >59 02/08/2022    LABGLOM >60 02/08/2022    GLUCOSE 139 02/08/2022    PROT 7.1 02/08/2022    CALCIUM 9.7 02/08/2022    BILITOT 0.4 02/08/2022    ALKPHOS 51 02/08/2022    AST 53 02/08/2022    ALT 78 02/08/2022       POC Tests: No results for input(s): POCGLU, POCNA, POCK, POCCL, POCBUN, POCHEMO, POCHCT in the last 72 hours. Coags: No results found for: PROTIME, INR, APTT    HCG (If Applicable): No results found for: PREGTESTUR, PREGSERUM, HCG, HCGQUANT     ABGs: No results found for: PHART, PO2ART, BKA1LIS, GWI7VTT, BEART, V9WHAJPH     Type & Screen (If Applicable):  No results found for: LABABO, LABRH    Drug/Infectious Status (If Applicable):  No results found for: HIV, HEPCAB    COVID-19 Screening (If Applicable): No results found for: COVID19        Anesthesia Evaluation  Patient summary reviewed and Nursing notes reviewed  Airway:         Dental:          Pulmonary:                              Cardiovascular:  Exercise tolerance: poor (<4 METS),            Beta Blocker:  Not on Beta Blocker         Neuro/Psych:   (+) neuromuscular disease:,              ROS comment: Lumbar Radiulopathy   GI/Hepatic/Renal:             Endo/Other:    (+) DiabetesType II DM, , : arthritis: OA., . Pt had no PAT visit       Abdominal:   (+) obese,           Vascular: Other Findings:             Anesthesia Plan      general and TIVA     ASA 3             Anesthetic plan and risks discussed with patient.                       ANNAMARIE Watson - CRNA   4/15/2022

## 2022-04-15 NOTE — ANESTHESIA PRE PROCEDURE
Department of Anesthesiology  Preprocedure Note       Name:  Catalina Benson   Age:  49 y.o.  :  1965                                          MRN:  [id number]         Date:  4/15/2022      Surgeon: Minesh Sanchez):  Oskar Haskins MD    Procedure: Procedure(s):  COLONOSCOPY POLYPECTOMY SNARE/COLD BIOPSY    Medications prior to admission:   Prior to Admission medications    Medication Sig Start Date End Date Taking? Authorizing Provider   tadalafil (CIALIS) 10 MG tablet Take 1 tablet by mouth daily as needed for Erectile Dysfunction 1/3/22   Rachel Mohamud MD   mometasone (NASONEX) 50 MCG/ACT nasal spray 2 sprays by Nasal route daily 10/18/21   Rachel Mohamud MD   diclofenac (VOLTAREN) 75 MG EC tablet Take 1 tablet by mouth 2 times daily 10/5/21   Rachel Mohamud MD   metFORMIN (GLUCOPHAGE) 1000 MG tablet Take 1 tablet by mouth 2 times daily (with meals) 10/5/21 4/3/22  Rachel Mohamud MD   diclofenac sodium (VOLTAREN) 1 % GEL Apply 2 g topically 2 times daily 10/5/21 2/15/22  Rachel Mohamud MD   blood glucose monitor strips Test 4 times a day DX E11.9 ONE TOUCH VERIO FLEX 5/3/21   Rachel Mohamud MD       Current medications:    No current facility-administered medications for this visit. No current outpatient medications on file. Facility-Administered Medications Ordered in Other Visits   Medication Dose Route Frequency Provider Last Rate Last Admin    lactated ringers infusion   IntraVENous Continuous Oskar Haskins  mL/hr at 04/15/22 1016 NoRateChange at 04/15/22 1016       Allergies:     Allergies   Allergen Reactions    Penicillins Shortness Of Breath    Iodine Nausea Only    Ketorolac Tromethamine Hives and Itching    Lyrica [Pregabalin]     Sulfa Antibiotics Hives       Problem List:    Patient Active Problem List   Diagnosis Code    Morbid obesity due to excess calories ([facility]) E66.01    Hypercholesteremia E78.00    Osteoarthritis of ankle M19.079    Type 2 diabetes mellitus ([facility] E11.9    Acute non-recurrent pansinusitis J01.40    Lumbar radiculopathy M54.16       Past Medical History:        Diagnosis Date    Arthritis     Back pain     Diabetes mellitus ([facility]     Seasonal allergies        Past Surgical History:        Procedure Laterality Date    CYST REMOVAL      right wrist    ENDOSCOPY, COLON, DIAGNOSTIC      TYMPANOSTOMY TUBE PLACEMENT      UPPER GASTROINTESTINAL ENDOSCOPY         Social History:    Social History     Tobacco Use    Smoking status: Former Smoker     Packs/day: 0.25     Years: 19.00     Pack years: 4.75     Types: Cigarettes     Quit date: 2012     Years since quitting: 10.2    Smokeless tobacco: Never Used   Substance Use Topics    Alcohol use: No                                Counseling given: Not Answered      Vital Signs (Current): There were no vitals filed for this visit.                                            BP Readings from Last 3 Encounters:   04/15/22 124/85   04/15/22 135/87   02/15/22 (!) 160/80       NPO Status:                                                                                 BMI:   Wt Readings from Last 3 Encounters:   04/15/22 (!) 360 lb (163.3 kg)   02/15/22 (!) 374 lb (169.6 kg)   10/05/21 (!) 370 lb 12.8 oz (168.2 kg)     There is no height or weight on file to calculate BMI.    CBC:   Lab Results   Component Value Date    WBC 9.2 03/31/2021    RBC 5.41 03/31/2021    HGB 15.3 03/31/2021    HCT 48.5 03/31/2021    MCV 89.6 03/31/2021    RDW 13.7 03/31/2021     03/31/2021       CMP:   Lab Results   Component Value Date     02/08/2022    K 4.4 02/08/2022     02/08/2022    CO2 26 02/08/2022    BUN 15 02/08/2022    CREATININE 0.9 02/08/2022    GFRAA >59 02/08/2022    LABGLOM >60 02/08/2022    GLUCOSE 139 02/08/2022    PROT 7.1 02/08/2022    CALCIUM 9.7 02/08/2022    BILITOT 0.4 02/08/2022    ALKPHOS 51 02/08/2022    AST 53 02/08/2022    ALT 78 02/08/2022       POC Tests:   Recent Labs 04/15/22  0853   POCGLU 136*       Coags: No results found for: PROTIME, INR, APTT    HCG (If Applicable): No results found for: PREGTESTUR, PREGSERUM, HCG, HCGQUANT     ABGs: No results found for: PHART, PO2ART, ZFV3SOR, GXY5OLP, BEART, M4WLAMKW     Type & Screen (If Applicable):  No results found for: LABABO, LABRH    Drug/Infectious Status (If Applicable):  No results found for: HIV, HEPCAB    COVID-19 Screening (If Applicable): No results found for: COVID19        Anesthesia Evaluation  Patient summary reviewed and Nursing notes reviewed  Airway: Mallampati: III  TM distance: >3 FB   Neck ROM: full  Mouth opening: > = 3 FB Dental:          Pulmonary:                              Cardiovascular:  Exercise tolerance: poor (<4 METS),            Beta Blocker:  Not on Beta Blocker         Neuro/Psych:   (+) neuromuscular disease:,              ROS comment: Lumbar Radiulopathy   GI/Hepatic/Renal:             Endo/Other:    (+) DiabetesType II DM, , : arthritis: OA., . Pt had no PAT visit       Abdominal:   (+) obese,           Vascular: Other Findings:               Anesthesia Plan      general and TIVA     ASA 3             Anesthetic plan and risks discussed with patient.                       ANNAMARIE Palmer - CRNA   4/15/2022

## 2022-04-15 NOTE — H&P
Patient Name: Serg Eduardo  : 1965  MRN: [id number]  DATE: 04/15/22    Allergies: Allergies   Allergen Reactions    Penicillins Shortness Of Breath    Iodine Nausea Only    Ketorolac Tromethamine Hives and Itching    Lyrica [Pregabalin]     Sulfa Antibiotics Hives        ENDOSCOPY  History and Physical    Procedure:    [] Diagnostic Colonoscopy       [x] Screening Colonoscopy  [] EGD      [] ERCP      [] EUS       [] Other    [x] Previous office notes/History and Physical reviewed from the patients chart. Please see EMR for further details of HPI. I have examined the patient's status immediately prior to the procedure and:      Indications/HPI:    []Abdominal Pain   []Barretts  [x]Screening/Surveillance   []History of Polyps  []Dysphagia            [] +Cologard/DNA testing  []Abnormal Imaging              []EOE Hx              [] Family Hx of CRC/Polyps  []Anemia                            []Food Impaction       []Recent Poor Prep  []GI Bleed             []Lymphadenopathy  []History of Polyps  []Change in bowel habits []Heartburn/Reflux  []Cancer- GI/Lung  []Chest Pain - Non Cardiac []Heme (+) Stool []Ulcers  []Constipation  []Hemoptysis  []Incontinence    []Diarrhea  []Hypoxemia  []Rectal Bleed (BRBPR)  []Nausea/Vomiting   [] Varices  []Crohns/Colitis  []Pancreatic Cyst   [] Cirrhosis   []Pancreatitis    []Abnormal MRCP  []Elevated LFT [] Stent Removal, Previous ERCP  []Other:     Anesthesia:   [x] MAC [] Moderate Sedation   [] General   [] None     ROS: 12 pt Review of Symptoms was negative unless mentioned above    Medications:   Prior to Admission medications    Medication Sig Start Date End Date Taking?  Authorizing Provider   tadalafil (CIALIS) 10 MG tablet Take 1 tablet by mouth daily as needed for Erectile Dysfunction 1/3/22   Tennille Pascual MD   mometasone (NASONEX) 50 MCG/ACT nasal spray 2 sprays by Nasal route daily 10/18/21   Tennille Pascual MD   diclofenac (VOLTAREN) 75 MG

## 2022-04-15 NOTE — OP NOTE
Patient: Marina Floyd : 1965  Med Rec#: [id number] Acc#: [id number]   Primary Care Provider Willow Pickard MD    Date of Procedure:  4/15/2022    Endoscopist: Kay Moreno MD    Referring Provider: Willow Pickard MD,     Operation Performed:   Colonoscopy with snare polypectomy  Colonoscopy with biopsy polypectomy    Indications: screening    Anesthesia:  Sedation was administered by anesthesia who monitored the patient during the procedure. I met with Marina Floyd prior to procedure. We discussed the procedure itself, and I have discussed the risks of endoscopy (including-- but not limited to-- pain, discomfort, bleeding potentially requiring second endoscopic procedure and/or blood transfusion, organ perforation requiring operative repair, damage to organs near the colon, infection, aspiration, cardiopulmonary/allergic reaction), benefits, indications to endoscopy. Additionally, we discussed options other than colonoscopy. The patient expressed understanding. All questions answered. The patient decided to proceed with the procedure. Signed informed consent was placed on the chart. Blood Loss: minimal    Withdrawal time: n/a  Bowel Prep: adequate     Complications: no immediate complications    DESCRIPTION OF PROCEDURE:     A time out was performed. After written informed consent was obtained, the patient was placed in the left lateral position. The perianal area was inspected, and a digital rectal exam was performed. A rectal exam was performed: normal tone, no palpable lesions. At this point, a forward viewing Olympus colonoscope was inserted into the anus and carefully advanced to the cecum. The cecum was identified by the ileocecal valve and the appendiceal orifice. The colonoscope was then slowly withdrawn with careful inspection of the mucosa in a linear and circumferential fashion. The scope was retroflexed in the rectum.  Suction was utilized during the procedure to remove as much air as possible from the bowel. The colonoscope was removed from the patient, and the procedure was terminated. Findings are listed below. Findings: The mucosa appeared normal throughout the entire examined colon     In the right colon there is an apparent submucosal lesion noted - ? Lipoma - biopsied to rule out adenomatous mucosal tissue. In the sigmoid colon a 6mm sessile polyp removed with cold snare polypectomy    In the recto-sigmoid colon, a 13mm sessile polyp was removed successfully with hot snare polypectomy    Two small sessile polyps removed from the rectum with cold forceps polypectomy. Retroflexion in the rectum was normal and revealed no further abnormalities         Recommendations:  1. Repeat colonoscopy: pending pathology - max of 3 yrs given size and number of polyps  2. Await biopsy results-you will receive a letter with your results within 7-10 days    Findings and recommendations were discussed w/ the patient. A copy of the images was provided.     Mary Baker MD  4/15/2022  10:52 AM

## 2022-04-15 NOTE — ANESTHESIA POSTPROCEDURE EVALUATION
Department of Anesthesiology  Postprocedure Note    Patient: Sharmaine Bach  MRN: [id number]  YOB: 1965  Date of evaluation: 4/15/2022  Time:  10:53 AM     Procedure Summary     Date: 04/15/22 Room / Location: [address]    Anesthesia Start: 7064 Anesthesia Stop: 1053    Procedure: COLONOSCOPY POLYPECTOMY SNARE/COLD BIOPSY (N/A ) Diagnosis: (SCREEN)    Surgeons: Irina Sandy MD Responsible Provider: ANNAMARIE Jimenez CRNA    Anesthesia Type: general, TIVA ASA Status: 3          Anesthesia Type: general, TIVA    Delmi Phase I: Delmi Score: 10    Delmi Phase II:      Last vitals: Reviewed and per EMR flowsheets.        Anesthesia Post Evaluation    Patient location during evaluation: bedside  Patient participation: complete - patient participated  Level of consciousness: awake and alert  Pain score: 0  Nausea & Vomiting: no nausea and no vomiting  Complications: no  Cardiovascular status: hemodynamically stable  Respiratory status: nonlabored ventilation, room air and acceptable  Hydration status: hypovolemic

## 2022-04-21 DIAGNOSIS — E11.9 TYPE 2 DIABETES MELLITUS WITHOUT COMPLICATION, WITH LONG-TERM CURRENT USE OF INSULIN (HCC): ICD-10-CM

## 2022-04-21 DIAGNOSIS — Z79.4 TYPE 2 DIABETES MELLITUS WITHOUT COMPLICATION, WITH LONG-TERM CURRENT USE OF INSULIN (HCC): ICD-10-CM

## 2022-04-21 NOTE — TELEPHONE ENCOUNTER
Mike Left called requesting a refill of the below medication which has been pended for you:     Requested Prescriptions     Pending Prescriptions Disp Refills    metFORMIN (GLUCOPHAGE) 1000 MG tablet 180 tablet 1     Sig: Take 1 tablet by mouth 2 times daily (with meals)       Last Appointment Date: 2/15/2022  Next Appointment Date: 6/16/2022    Allergies   Allergen Reactions    Penicillins Shortness Of Breath    Iodine Nausea Only    Ketorolac Tromethamine Hives and Itching    Lyrica [Pregabalin]     Sulfa Antibiotics Hives

## 2022-05-03 DIAGNOSIS — E11.9 TYPE 2 DIABETES MELLITUS WITHOUT COMPLICATION, WITH LONG-TERM CURRENT USE OF INSULIN (HCC): Primary | ICD-10-CM

## 2022-05-03 DIAGNOSIS — Z79.4 TYPE 2 DIABETES MELLITUS WITHOUT COMPLICATION, WITH LONG-TERM CURRENT USE OF INSULIN (HCC): Primary | ICD-10-CM

## 2022-05-03 RX ORDER — LANCETS 30 GAUGE
1 EACH MISCELLANEOUS 4 TIMES DAILY
Qty: 100 EACH | Refills: 3 | Status: SHIPPED | OUTPATIENT
Start: 2022-05-03

## 2022-05-03 NOTE — TELEPHONE ENCOUNTER
Jennifer Rossi called requesting a refill of the below medication which has been pended for you:     Requested Prescriptions     Pending Prescriptions Disp Refills    Lancets MISC 100 each 3     Si each by Does not apply route 4 times daily       Last Appointment Date: 2/15/2022  Next Appointment Date: 2022    Allergies   Allergen Reactions    Penicillins Shortness Of Breath    Iodine Nausea Only    Ketorolac Tromethamine Hives and Itching    Lyrica [Pregabalin]     Sulfa Antibiotics Hives

## 2022-05-14 DIAGNOSIS — M19.079 OSTEOARTHRITIS OF ANKLE, UNSPECIFIED LATERALITY, UNSPECIFIED OSTEOARTHRITIS TYPE: ICD-10-CM

## 2022-05-16 RX ORDER — GLUCOSAMINE HCL/CHONDROITIN SU 500-400 MG
CAPSULE ORAL
Qty: 200 STRIP | Refills: 5 | Status: SHIPPED | OUTPATIENT
Start: 2022-05-16

## 2022-05-16 RX ORDER — DICLOFENAC SODIUM 75 MG/1
75 TABLET, DELAYED RELEASE ORAL 2 TIMES DAILY
Qty: 180 TABLET | Refills: 1 | Status: SHIPPED | OUTPATIENT
Start: 2022-05-16

## 2022-06-08 DIAGNOSIS — M54.16 LUMBAR RADICULOPATHY: ICD-10-CM

## 2022-06-08 DIAGNOSIS — E78.00 HYPERCHOLESTEREMIA: ICD-10-CM

## 2022-06-08 DIAGNOSIS — Z12.11 COLON CANCER SCREENING: ICD-10-CM

## 2022-06-08 DIAGNOSIS — E11.9 TYPE 2 DIABETES MELLITUS WITHOUT COMPLICATION, WITH LONG-TERM CURRENT USE OF INSULIN (HCC): ICD-10-CM

## 2022-06-08 DIAGNOSIS — Z79.4 TYPE 2 DIABETES MELLITUS WITHOUT COMPLICATION, WITH LONG-TERM CURRENT USE OF INSULIN (HCC): ICD-10-CM

## 2022-06-08 LAB
ALBUMIN SERPL-MCNC: 4.5 G/DL (ref 3.5–5.2)
ALP BLD-CCNC: 53 U/L (ref 40–130)
ALT SERPL-CCNC: 68 U/L (ref 5–41)
ANION GAP SERPL CALCULATED.3IONS-SCNC: 11 MMOL/L (ref 7–19)
AST SERPL-CCNC: 45 U/L (ref 5–40)
BILIRUB SERPL-MCNC: 0.4 MG/DL (ref 0.2–1.2)
BUN BLDV-MCNC: 15 MG/DL (ref 6–20)
CALCIUM SERPL-MCNC: 9.9 MG/DL (ref 8.6–10)
CHLORIDE BLD-SCNC: 104 MMOL/L (ref 98–111)
CHOLESTEROL, TOTAL: 225 MG/DL (ref 160–199)
CO2: 27 MMOL/L (ref 22–29)
CREAT SERPL-MCNC: 1 MG/DL (ref 0.5–1.2)
GFR AFRICAN AMERICAN: >59
GFR NON-AFRICAN AMERICAN: >60
GLUCOSE BLD-MCNC: 142 MG/DL (ref 74–109)
HBA1C MFR BLD: 7.7 % (ref 4–6)
HDLC SERPL-MCNC: 41 MG/DL (ref 55–121)
LDL CHOLESTEROL CALCULATED: 139 MG/DL
POTASSIUM SERPL-SCNC: 4.5 MMOL/L (ref 3.5–5)
SODIUM BLD-SCNC: 142 MMOL/L (ref 136–145)
TOTAL PROTEIN: 7 G/DL (ref 6.6–8.7)
TRIGL SERPL-MCNC: 225 MG/DL (ref 0–149)

## 2022-06-16 ENCOUNTER — OFFICE VISIT (OUTPATIENT)
Dept: INTERNAL MEDICINE | Age: 57
End: 2022-06-16
Payer: COMMERCIAL

## 2022-06-16 VITALS
HEIGHT: 78 IN | SYSTOLIC BLOOD PRESSURE: 136 MMHG | OXYGEN SATURATION: 97 % | HEART RATE: 68 BPM | WEIGHT: 315 LBS | BODY MASS INDEX: 36.45 KG/M2 | DIASTOLIC BLOOD PRESSURE: 82 MMHG

## 2022-06-16 DIAGNOSIS — M54.16 LUMBAR RADICULOPATHY: ICD-10-CM

## 2022-06-16 DIAGNOSIS — K13.79 MOUTH SORES: Primary | ICD-10-CM

## 2022-06-16 DIAGNOSIS — R07.89 ATYPICAL CHEST PAIN: ICD-10-CM

## 2022-06-16 DIAGNOSIS — E11.9 TYPE 2 DIABETES MELLITUS WITHOUT COMPLICATION, WITH LONG-TERM CURRENT USE OF INSULIN (HCC): ICD-10-CM

## 2022-06-16 DIAGNOSIS — E78.00 HYPERCHOLESTEREMIA: ICD-10-CM

## 2022-06-16 DIAGNOSIS — Z79.4 TYPE 2 DIABETES MELLITUS WITHOUT COMPLICATION, WITH LONG-TERM CURRENT USE OF INSULIN (HCC): ICD-10-CM

## 2022-06-16 PROCEDURE — 99214 OFFICE O/P EST MOD 30 MIN: CPT | Performed by: INTERNAL MEDICINE

## 2022-06-16 PROCEDURE — 3051F HG A1C>EQUAL 7.0%<8.0%: CPT | Performed by: INTERNAL MEDICINE

## 2022-06-16 RX ORDER — HYDROCODONE BITARTRATE AND ACETAMINOPHEN 5; 325 MG/1; MG/1
1 TABLET ORAL 2 TIMES DAILY
COMMUNITY

## 2022-06-16 RX ORDER — DULOXETIN HYDROCHLORIDE 20 MG/1
20 CAPSULE, DELAYED RELEASE ORAL NIGHTLY
COMMUNITY
Start: 2022-06-09 | End: 2022-10-20

## 2022-06-16 ASSESSMENT — PATIENT HEALTH QUESTIONNAIRE - PHQ9
SUM OF ALL RESPONSES TO PHQ QUESTIONS 1-9: 0
SUM OF ALL RESPONSES TO PHQ QUESTIONS 1-9: 0
2. FEELING DOWN, DEPRESSED OR HOPELESS: 0
SUM OF ALL RESPONSES TO PHQ QUESTIONS 1-9: 0
SUM OF ALL RESPONSES TO PHQ QUESTIONS 1-9: 0
1. LITTLE INTEREST OR PLEASURE IN DOING THINGS: 0
SUM OF ALL RESPONSES TO PHQ9 QUESTIONS 1 & 2: 0

## 2022-06-16 ASSESSMENT — ENCOUNTER SYMPTOMS
ABDOMINAL DISTENTION: 0
BLOOD IN STOOL: 0
EYE ITCHING: 0
ABDOMINAL PAIN: 0
SORE THROAT: 0
NAUSEA: 0
SHORTNESS OF BREATH: 0
SINUS PRESSURE: 0
VOMITING: 0
BACK PAIN: 0
TROUBLE SWALLOWING: 0
WHEEZING: 0
EYE DISCHARGE: 0

## 2022-06-16 NOTE — PROGRESS NOTES
[facility] PHYSICIAN SERVICES  [facility] INTERNAL MEDICINE  [address] [address]  Dept: [phone]  Dept Fax: [phone]: [phone]      Visit Date: 6/16/2022    Kerwin Holden a 49 y.o. male who presents today for:  Chief Complaint   Patient presents with    Annual Exam         HPI:     For his annual exam.  Patient without any new complaints feels well and had lab for review. Past Medical History:   Diagnosis Date    Arthritis     Back pain     Diabetes mellitus ([address]     Seasonal allergies       Past Surgical History:   Procedure Laterality Date    COLONOSCOPY N/A 04/15/2022    Dr Candice Yee-Apparent submucosal lesion noted ? Lipoma, r/o rule out adenomatous mucosal tissue. in the right colon-AP x 1, HP x 3, BCM x 1, 3 yr recall    CYST REMOVAL      right wrist    ENDOSCOPY, COLON, DIAGNOSTIC      TYMPANOSTOMY TUBE PLACEMENT      UPPER GASTROINTESTINAL ENDOSCOPY         No family history on file. Social History     Tobacco Use    Smoking status: Former Smoker     Packs/day: 0.25     Years: 19.00     Pack years: 4.75     Types: Cigarettes     Quit date: 2012     Years since quitting: 10.4    Smokeless tobacco: Never Used   Substance Use Topics    Alcohol use: No      Current Outpatient Medications   Medication Sig Dispense Refill    DULoxetine (CYMBALTA) 20 MG extended release capsule Take 20 mg by mouth nightly      HYDROcodone-acetaminophen (NORCO) 5-325 MG per tablet Take 1 tablet by mouth 2 times daily.       blood glucose monitor strips Test 4 times a day DX E11.9 ONE TOUCH VERIO FLEX 200 strip 5    diclofenac (VOLTAREN) 75 MG EC tablet Take 1 tablet by mouth 2 times daily 180 tablet 1    Lancets MISC 1 each by Does not apply route 4 times daily 100 each 3    metFORMIN (GLUCOPHAGE) 1000 MG tablet Take 1 tablet by mouth 2 times daily (with meals) 180 tablet 1    tadalafil (CIALIS) 10 MG tablet Take 1 tablet by mouth daily as needed for Erectile Dysfunction 10 tablet 3    mometasone (NASONEX) 50 MCG/ACT nasal spray 2 sprays by Nasal route daily 1 each 3    diclofenac sodium (VOLTAREN) 1 % GEL Apply 2 g topically 2 times daily 1 g 5     No current facility-administered medications for this visit. Allergies   Allergen Reactions    Penicillins Shortness Of Breath    Iodine Nausea Only    Ketorolac Tromethamine Hives and Itching    Lyrica [Pregabalin]     Sulfa Antibiotics Hives         Subjective:     Review of Systems   Constitutional: Negative for activity change, appetite change, fatigue and fever. HENT: Negative for congestion, hearing loss, sinus pressure, sore throat and trouble swallowing. Eyes: Negative for discharge and itching. Respiratory: Negative for shortness of breath and wheezing. Cardiovascular: Negative for chest pain, palpitations and leg swelling. Gastrointestinal: Negative for abdominal distention, abdominal pain, blood in stool, nausea and vomiting. Endocrine: Negative for cold intolerance, heat intolerance and polydipsia. Genitourinary: Negative for flank pain, frequency, hematuria and urgency. Musculoskeletal: Negative for arthralgias, back pain and joint swelling. Skin: Negative for rash and wound. Allergic/Immunologic: Negative for environmental allergies and food allergies. Neurological: Negative for dizziness, tremors, syncope, weakness, numbness and headaches. Hematological: Negative for adenopathy. Psychiatric/Behavioral: Negative for agitation and hallucinations. The patient is not nervous/anxious. Objective:      /82 (Site: Left Upper Arm, Position: Sitting, Cuff Size: Large Adult)   Pulse 68   Ht 6' 7\" (2.007 m)   Wt (!) 364 lb (165.1 kg)   SpO2 97%   BMI 41.01 kg/m²    Physical Exam  Constitutional:       General: He is not in acute distress. Appearance: He is well-developed. He is not diaphoretic. HENT:      Head: Normocephalic and atraumatic.       Right Ear: External ear normal.      Left Ear: External ear normal.      Nose: Nose normal.      Mouth/Throat:      Pharynx: No oropharyngeal exudate. Eyes:      General: No scleral icterus. Right eye: No discharge. Left eye: No discharge. Conjunctiva/sclera: Conjunctivae normal.      Pupils: Pupils are equal, round, and reactive to light. Neck:      Thyroid: No thyromegaly. Vascular: No JVD. Trachea: No tracheal deviation. Cardiovascular:      Rate and Rhythm: Normal rate and regular rhythm. Heart sounds: Normal heart sounds. No murmur heard. No friction rub. No gallop. Pulmonary:      Effort: Pulmonary effort is normal. No respiratory distress. Breath sounds: Normal breath sounds. No wheezing or rales. Abdominal:      General: Bowel sounds are normal. There is no distension. Palpations: Abdomen is soft. There is no mass. Tenderness: There is no abdominal tenderness. There is no guarding or rebound. Musculoskeletal:         General: No tenderness or deformity. Normal range of motion. Cervical back: Normal range of motion and neck supple. Lymphadenopathy:      Cervical: No cervical adenopathy. Skin:     General: Skin is warm and dry. Coloration: Skin is not pale. Findings: No erythema or rash. Neurological:      Mental Status: He is alert and oriented to person, place, and time. Cranial Nerves: No cranial nerve deficit. Motor: No abnormal muscle tone. Coordination: Coordination normal.      Deep Tendon Reflexes: Reflexes are normal and symmetric. Reflexes normal.   Psychiatric:         Behavior: Behavior normal.         Thought Content: Thought content normal.         Judgment: Judgment normal.          Assessment:      Diagnosis Orders   1. Mouth sores     2. Type 2 diabetes mellitus without complication, with long-term current use of insulin ([facility])  ECHO (Dobutamine) Pharmacological Stress Test   3.  Hypercholesteremia 4. Lumbar radiculopathy     5. Atypical chest pain  ECHO (Dobutamine) Pharmacological Stress Test            Plan:     Type 2 diabetes mellitus. His hemoglobin A1c is 7.7 down from 7.8 in February. Fasting sugars doing about the same at 142. He is on metformin 1000 mg twice daily and will continue the same. Hypercholesterolemia. His LDL is down his total cholesterol is down triglycerides are okay. He is doing it with diet and exercise    Lumbar radiculopathy. He still has back pain going to pain management and they have recently started him on Cymbalta a week ago to see if that will help with his neuropathic pain. We will continue the same medications and recheck again in 4 months. Is also having some atypical chest pain with a family history of heart disease and him being a diabetic we need to his set up a stress dobutamine echo to make sure he has tightness that he gets in his chest with exertion is not coronary artery disease. No follow-ups on file. Patient given educational materials- see patient instructions. Discussed use, benefit, and side effects of prescribedmedications. All patient questions answered. Pt voiced understanding. Reviewedhealth maintenance. Instructed to continue current medications, diet and exercise. Patient agreed with treatment plan. **This report has been created usingvoice recognition software. It may contain minor errors which are inherent in voicerecognition technology. **    Electronically signed by Joselyn Begum MD on 6/16/2022 at 3:59 PM

## 2022-10-12 DIAGNOSIS — E11.9 TYPE 2 DIABETES MELLITUS WITHOUT COMPLICATION, WITH LONG-TERM CURRENT USE OF INSULIN (HCC): ICD-10-CM

## 2022-10-12 DIAGNOSIS — Z79.4 TYPE 2 DIABETES MELLITUS WITHOUT COMPLICATION, WITH LONG-TERM CURRENT USE OF INSULIN (HCC): ICD-10-CM

## 2022-10-12 DIAGNOSIS — E78.00 HYPERCHOLESTEREMIA: Primary | ICD-10-CM

## 2022-10-18 DIAGNOSIS — Z79.4 TYPE 2 DIABETES MELLITUS WITHOUT COMPLICATION, WITH LONG-TERM CURRENT USE OF INSULIN (HCC): ICD-10-CM

## 2022-10-18 DIAGNOSIS — E78.00 HYPERCHOLESTEREMIA: ICD-10-CM

## 2022-10-18 DIAGNOSIS — E11.9 TYPE 2 DIABETES MELLITUS WITHOUT COMPLICATION, WITH LONG-TERM CURRENT USE OF INSULIN (HCC): ICD-10-CM

## 2022-10-18 LAB
ALBUMIN SERPL-MCNC: 4.5 G/DL (ref 3.5–5.2)
ALP BLD-CCNC: 57 U/L (ref 40–130)
ALT SERPL-CCNC: 74 U/L (ref 5–41)
ANION GAP SERPL CALCULATED.3IONS-SCNC: 11 MMOL/L (ref 7–19)
AST SERPL-CCNC: 43 U/L (ref 5–40)
BILIRUB SERPL-MCNC: 0.4 MG/DL (ref 0.2–1.2)
BUN BLDV-MCNC: 13 MG/DL (ref 6–20)
CALCIUM SERPL-MCNC: 10.1 MG/DL (ref 8.6–10)
CHLORIDE BLD-SCNC: 100 MMOL/L (ref 98–111)
CHOLESTEROL, TOTAL: 228 MG/DL (ref 160–199)
CO2: 28 MMOL/L (ref 22–29)
CREAT SERPL-MCNC: 1.1 MG/DL (ref 0.5–1.2)
GFR SERPL CREATININE-BSD FRML MDRD: >60 ML/MIN/{1.73_M2}
GLUCOSE BLD-MCNC: 132 MG/DL (ref 74–109)
HBA1C MFR BLD: 7.4 % (ref 4–6)
HDLC SERPL-MCNC: 44 MG/DL (ref 55–121)
LDL CHOLESTEROL CALCULATED: 143 MG/DL
POTASSIUM SERPL-SCNC: 4.7 MMOL/L (ref 3.5–5)
SODIUM BLD-SCNC: 139 MMOL/L (ref 136–145)
TOTAL PROTEIN: 6.7 G/DL (ref 6.6–8.7)
TRIGL SERPL-MCNC: 206 MG/DL (ref 0–149)

## 2022-10-20 ENCOUNTER — OFFICE VISIT (OUTPATIENT)
Dept: INTERNAL MEDICINE | Age: 57
End: 2022-10-20
Payer: COMMERCIAL

## 2022-10-20 VITALS
BODY MASS INDEX: 41.46 KG/M2 | OXYGEN SATURATION: 95 % | HEART RATE: 70 BPM | TEMPERATURE: 97.7 F | SYSTOLIC BLOOD PRESSURE: 132 MMHG | DIASTOLIC BLOOD PRESSURE: 82 MMHG | WEIGHT: 315 LBS

## 2022-10-20 DIAGNOSIS — R07.89 ATYPICAL CHEST PAIN: ICD-10-CM

## 2022-10-20 DIAGNOSIS — E78.00 HYPERCHOLESTEREMIA: ICD-10-CM

## 2022-10-20 DIAGNOSIS — E11.00 TYPE 2 DIABETES MELLITUS WITH HYPEROSMOLARITY WITHOUT COMA, WITHOUT LONG-TERM CURRENT USE OF INSULIN (HCC): ICD-10-CM

## 2022-10-20 DIAGNOSIS — Z23 NEED FOR INFLUENZA VACCINATION: Primary | ICD-10-CM

## 2022-10-20 DIAGNOSIS — E11.9 TYPE 2 DIABETES MELLITUS WITHOUT COMPLICATION, WITH LONG-TERM CURRENT USE OF INSULIN (HCC): ICD-10-CM

## 2022-10-20 DIAGNOSIS — M54.16 LUMBAR RADICULOPATHY: ICD-10-CM

## 2022-10-20 DIAGNOSIS — Z79.4 TYPE 2 DIABETES MELLITUS WITHOUT COMPLICATION, WITH LONG-TERM CURRENT USE OF INSULIN (HCC): ICD-10-CM

## 2022-10-20 PROCEDURE — 99214 OFFICE O/P EST MOD 30 MIN: CPT | Performed by: INTERNAL MEDICINE

## 2022-10-20 PROCEDURE — 3051F HG A1C>EQUAL 7.0%<8.0%: CPT | Performed by: INTERNAL MEDICINE

## 2022-10-20 PROCEDURE — 90674 CCIIV4 VAC NO PRSV 0.5 ML IM: CPT | Performed by: INTERNAL MEDICINE

## 2022-10-20 PROCEDURE — 90471 IMMUNIZATION ADMIN: CPT | Performed by: INTERNAL MEDICINE

## 2022-10-20 SDOH — ECONOMIC STABILITY: FOOD INSECURITY: WITHIN THE PAST 12 MONTHS, THE FOOD YOU BOUGHT JUST DIDN'T LAST AND YOU DIDN'T HAVE MONEY TO GET MORE.: NEVER TRUE

## 2022-10-20 SDOH — ECONOMIC STABILITY: FOOD INSECURITY: WITHIN THE PAST 12 MONTHS, YOU WORRIED THAT YOUR FOOD WOULD RUN OUT BEFORE YOU GOT MONEY TO BUY MORE.: NEVER TRUE

## 2022-10-20 ASSESSMENT — ENCOUNTER SYMPTOMS
SINUS PRESSURE: 0
VOMITING: 0
ABDOMINAL DISTENTION: 0
SORE THROAT: 0
BLOOD IN STOOL: 0
SHORTNESS OF BREATH: 0
WHEEZING: 0
TROUBLE SWALLOWING: 0
EYE DISCHARGE: 0
NAUSEA: 0
BACK PAIN: 0
ABDOMINAL PAIN: 0
EYE ITCHING: 0

## 2022-10-20 ASSESSMENT — SOCIAL DETERMINANTS OF HEALTH (SDOH): HOW HARD IS IT FOR YOU TO PAY FOR THE VERY BASICS LIKE FOOD, HOUSING, MEDICAL CARE, AND HEATING?: NOT HARD AT ALL

## 2022-10-20 NOTE — PROGRESS NOTES
After obtaining written consent from patient and per orders of Dr. Vamsi Kuhn, injection of Flucelvax administered Intramuscular in Left deltoid by Elenor Litten. Patient tolerated well with no immediate reactions noted in office.

## 2022-10-20 NOTE — PROGRESS NOTES
[facility] PHYSICIAN SERVICES  [facility] INTERNAL MEDICINE  [address] [address]  Dept: [phone]  Dept Fax: [phone]: [phone]      Visit Date: 10/20/2022    Faisal blount 56 y.o. male who presents today for:  Chief Complaint   Patient presents with    Diabetes Care Management         HPI:     Henok Batista is in for follow-up on his diabetes. He had lab for review. It is stress test done looking at his atypical chest pain and diabetic history due to the fact that his insurance was not pay for $900 on it. Social History     Tobacco Use    Smoking status: Former     Packs/day: 0.25     Years: 19.00     Pack years: 4.75     Types: Cigarettes     Quit date: 2012     Years since quitting: 10.8    Smokeless tobacco: Never   Substance Use Topics    Alcohol use: No      Current Outpatient Medications   Medication Sig Dispense Refill    HYDROcodone-acetaminophen (NORCO) 5-325 MG per tablet Take 1 tablet by mouth 2 times daily. blood glucose monitor strips Test 4 times a day DX E11.9 ONE TOUCH VERIO FLEX 200 strip 5    diclofenac (VOLTAREN) 75 MG EC tablet Take 1 tablet by mouth 2 times daily 180 tablet 1    Lancets MISC 1 each by Does not apply route 4 times daily 100 each 3    metFORMIN (GLUCOPHAGE) 1000 MG tablet Take 1 tablet by mouth 2 times daily (with meals) 180 tablet 1    tadalafil (CIALIS) 10 MG tablet Take 1 tablet by mouth daily as needed for Erectile Dysfunction 10 tablet 3    mometasone (NASONEX) 50 MCG/ACT nasal spray 2 sprays by Nasal route daily 1 each 3    diclofenac sodium (VOLTAREN) 1 % GEL Apply 2 g topically 2 times daily 1 g 5     No current facility-administered medications for this visit.      Allergies   Allergen Reactions    Penicillins Shortness Of Breath    Iodine Nausea Only    Ketorolac Tromethamine Hives and Itching    Lyrica [Pregabalin]     Sulfa Antibiotics Hives         Subjective:      Review of Systems   Constitutional: Negative for activity change, appetite change, fatigue and fever. HENT:  Negative for congestion, hearing loss, sinus pressure, sore throat and trouble swallowing. Eyes:  Negative for discharge and itching. Respiratory:  Negative for shortness of breath and wheezing. Cardiovascular:  Positive for chest pain. Negative for palpitations and leg swelling. Gastrointestinal:  Negative for abdominal distention, abdominal pain, blood in stool, nausea and vomiting. Endocrine: Negative for cold intolerance, heat intolerance and polydipsia. Genitourinary:  Negative for flank pain, frequency, hematuria and urgency. Musculoskeletal:  Negative for arthralgias, back pain and joint swelling. Skin:  Negative for rash and wound. Allergic/Immunologic: Negative for environmental allergies and food allergies. Neurological:  Negative for dizziness, tremors, syncope, weakness, numbness and headaches. Hematological:  Negative for adenopathy. Psychiatric/Behavioral:  Negative for agitation and hallucinations. The patient is not nervous/anxious. Objective:     /82 (Site: Left Upper Arm)   Pulse 70   Temp 97.7 °F (36.5 °C)   Wt (!) 368 lb (166.9 kg)   SpO2 95%   BMI 41.46 kg/m²   Physical Exam  Constitutional:       General: He is not in acute distress. Appearance: He is well-developed. He is not diaphoretic. HENT:      Head: Normocephalic and atraumatic. Right Ear: External ear normal.      Left Ear: External ear normal.      Nose: Nose normal.      Mouth/Throat:      Pharynx: No oropharyngeal exudate. Eyes:      General: No scleral icterus. Right eye: No discharge. Left eye: No discharge. Conjunctiva/sclera: Conjunctivae normal.      Pupils: Pupils are equal, round, and reactive to light. Neck:      Thyroid: No thyromegaly. Vascular: No JVD. Trachea: No tracheal deviation. Cardiovascular:      Rate and Rhythm: Normal rate and regular rhythm. Heart sounds: Normal heart sounds. No murmur heard. No friction rub. No gallop. Pulmonary:      Effort: Pulmonary effort is normal. No respiratory distress. Breath sounds: Normal breath sounds. No wheezing or rales. Abdominal:      General: Bowel sounds are normal. There is no distension. Palpations: Abdomen is soft. There is no mass. Tenderness: There is no abdominal tenderness. There is no guarding or rebound. Musculoskeletal:         General: No tenderness or deformity. Normal range of motion. Cervical back: Normal range of motion and neck supple. Lymphadenopathy:      Cervical: No cervical adenopathy. Skin:     General: Skin is warm and dry. Coloration: Skin is not pale. Findings: No erythema or rash. Neurological:      Mental Status: He is alert and oriented to person, place, and time. Cranial Nerves: No cranial nerve deficit. Motor: No abnormal muscle tone. Coordination: Coordination normal.      Deep Tendon Reflexes: Reflexes are normal and symmetric. Reflexes normal.   Psychiatric:         Behavior: Behavior normal.         Thought Content: Thought content normal.         Judgment: Judgment normal.        Assessment:      Diagnosis Orders   1. Need for influenza vaccination  Influenza, FLUCELVAX, (age 10 mo+), IM, Preservative Free, 0.5 mL      2. Hypercholesteremia        3. Lumbar radiculopathy        4. Type 2 diabetes mellitus with hyperosmolarity without coma, without long-term current use of insulin ([facility] [address]                 Plan:     Hypercholesterolemia. His numbers are down somewhat. His triglycerides are still high. He needs to cut out the red meat we talked about that today. Lumbar radiculopathy which is unchanged and stable. Type 2 diabetes mellitus. He is on metformin 1000 mg twice daily and his hemoglobin A1c is 7.4. Still has atypical chest pain.   Still needs a stress test.  I Yeimy make referral to cardiology to have them evaluate his atypical chest pain with him being a diabetic, very resistant to just say forget it. He is willing to go. We will set up referral to cardiology to see if they have concur that he needs a stress test.  We will see him back in 4 months with lab    No follow-ups on file. Patient given educational materials- see patient instructions. Discussed use, benefit, and side effects of prescribedmedications. All patient questions answered. Pt voiced understanding. Reviewedhealth maintenance. Instructed to continue current medications, diet and exercise. Patient agreed with treatment plan. **This report has been created usingvoice recognition software. It may contain minor errors which are inherent in voicerecognition technology. **    Electronically signed by Manda Castellanos MD on 10/20/2022 at 3:40 PM

## 2022-10-21 NOTE — TELEPHONE ENCOUNTER
Yesy Monrealer called requesting a refill of the below medication which has been pended for you:     Requested Prescriptions     Pending Prescriptions Disp Refills    metFORMIN (GLUCOPHAGE) 1000 MG tablet 180 tablet 1     Sig: Take 1 tablet by mouth 2 times daily (with meals)       Last Appointment Date: 10/20/2022  Next Appointment Date: 2/23/2023    Allergies   Allergen Reactions    Penicillins Shortness Of Breath    Iodine Nausea Only    Ketorolac Tromethamine Hives and Itching    Lyrica [Pregabalin]     Sulfa Antibiotics Hives

## 2022-11-22 DIAGNOSIS — M19.079 OSTEOARTHRITIS OF ANKLE, UNSPECIFIED LATERALITY, UNSPECIFIED OSTEOARTHRITIS TYPE: ICD-10-CM

## 2022-11-22 NOTE — TELEPHONE ENCOUNTER
Shaheen Maddox called requesting a refill of the below medication which has been pended for you:     Requested Prescriptions     Pending Prescriptions Disp Refills    diclofenac (VOLTAREN) 75 MG EC tablet 180 tablet 1     Sig: Take 1 tablet by mouth 2 times daily       Last Appointment Date: 10/20/2022  Next Appointment Date: 2/23/2023    Allergies   Allergen Reactions    Penicillins Shortness Of Breath    Iodine Nausea Only    Ketorolac Tromethamine Hives and Itching    Lyrica [Pregabalin]     Sulfa Antibiotics Hives

## 2022-11-23 RX ORDER — DICLOFENAC SODIUM 75 MG/1
75 TABLET, DELAYED RELEASE ORAL 2 TIMES DAILY
Qty: 180 TABLET | Refills: 1 | Status: SHIPPED | OUTPATIENT
Start: 2022-11-23

## 2023-01-12 ENCOUNTER — OFFICE VISIT (OUTPATIENT)
Dept: CARDIOLOGY CLINIC | Age: 58
End: 2023-01-12
Payer: COMMERCIAL

## 2023-01-12 VITALS
OXYGEN SATURATION: 97 % | HEIGHT: 78 IN | WEIGHT: 315 LBS | DIASTOLIC BLOOD PRESSURE: 80 MMHG | BODY MASS INDEX: 36.45 KG/M2 | SYSTOLIC BLOOD PRESSURE: 134 MMHG | HEART RATE: 75 BPM

## 2023-01-12 DIAGNOSIS — I25.83 CORONARY ARTERY DISEASE DUE TO LIPID RICH PLAQUE: ICD-10-CM

## 2023-01-12 DIAGNOSIS — R07.89 ATYPICAL CHEST PAIN: Primary | ICD-10-CM

## 2023-01-12 DIAGNOSIS — I25.10 CORONARY ARTERY DISEASE DUE TO LIPID RICH PLAQUE: ICD-10-CM

## 2023-01-12 PROCEDURE — 93000 ELECTROCARDIOGRAM COMPLETE: CPT | Performed by: INTERNAL MEDICINE

## 2023-01-12 PROCEDURE — 99214 OFFICE O/P EST MOD 30 MIN: CPT | Performed by: INTERNAL MEDICINE

## 2023-01-12 ASSESSMENT — ENCOUNTER SYMPTOMS
FACIAL SWELLING: 0
BLOOD IN STOOL: 0
APNEA: 0
ABDOMINAL PAIN: 0
SORE THROAT: 0
COUGH: 0
WHEEZING: 0
DIARRHEA: 0
VOMITING: 0
NAUSEA: 0
CHEST TIGHTNESS: 0
EYE DISCHARGE: 0
CONSTIPATION: 0
EYE PAIN: 0
EYE REDNESS: 0
ABDOMINAL DISTENTION: 0
SHORTNESS OF BREATH: 0

## 2023-01-12 NOTE — PROGRESS NOTES
Cardiology Office Visit Note  [place], [address]  [address]  Phone: [phone]  Fax: [phone]                            Date:  1/12/2023  Patient: Juju Mehta  Age:  56 y.o., 1965    Referral: Lucien Skaggs MD    REASON FOR VISIT:  Chest fluttering      PROBLEM LIST:    Patient Active Problem List    Diagnosis Date Noted    Atypical chest pain 06/16/2022     Priority: Medium    Lumbar radiculopathy 02/15/2022    Acute non-recurrent pansinusitis 08/19/2019    Morbid obesity due to excess calories ([facility] 02/22/2018    Hypercholesteremia 02/22/2018    Osteoarthritis of ankle 02/22/2018    Type 2 diabetes mellitus ([facility] 02/22/2018         PRESENTATION: Juju Mehta is a 56y.o. year old male is seen today in cardiology consultation today for further evaluation of chest fluttering. He is known to be diabetic, noninsulin-dependent and also morbidly obese. He reports that over the past year he has been having random episodes of chest fluttering. Last episode was probably 2 weeks ago. Symptoms occur in general every few weeks. No associated chest pain. He did develop COVID-19 in November 2022 and at that time had mostly sinus symptoms as well as mild shortness of breath which has since improved. His main complaint is that of chronic back pain related to nerve compression related to known scoliosis. REVIEW OF SYSTEMS:  Review of Systems   Constitutional:  Negative for chills, fatigue and fever. HENT:  Negative for congestion, facial swelling, hearing loss and sore throat. Eyes:  Negative for pain, discharge, redness and visual disturbance. Respiratory:  Negative for apnea, cough, chest tightness, shortness of breath and wheezing. Cardiovascular:  Positive for palpitations. Negative for chest pain and leg swelling. Gastrointestinal:  Negative for abdominal distention, abdominal pain, blood in stool, constipation, diarrhea, nausea and vomiting. Endocrine: Negative for polydipsia, polyphagia and polyuria. Genitourinary:  Negative for dysuria, flank pain, frequency and hematuria. Musculoskeletal:  Negative for joint swelling, myalgias and neck pain. Skin:  Negative for pallor and rash. Neurological:  Negative for dizziness, syncope, speech difficulty, light-headedness, numbness and headaches. Psychiatric/Behavioral:  Negative for confusion, hallucinations and sleep disturbance. Past Medical History:      Diagnosis Date    Arthritis     Back pain     Diabetes mellitus ([address]     Seasonal allergies        Past Surgical History:      Procedure Laterality Date    COLONOSCOPY N/A 04/15/2022    Dr Riccardo Yee-Apparent submucosal lesion noted ? Lipoma, r/o rule out adenomatous mucosal tissue. in the right colon-AP x 1, HP x 3, BCM x 1, 3 yr recall    CYST REMOVAL      right wrist    ENDOSCOPY, COLON, DIAGNOSTIC      TYMPANOSTOMY TUBE PLACEMENT      UPPER GASTROINTESTINAL ENDOSCOPY         Medications:  Current Outpatient Medications   Medication Sig Dispense Refill    diclofenac (VOLTAREN) 75 MG EC tablet Take 1 tablet by mouth 2 times daily 180 tablet 1    metFORMIN (GLUCOPHAGE) 1000 MG tablet Take 1 tablet by mouth 2 times daily (with meals) 180 tablet 1    HYDROcodone-acetaminophen (NORCO) 5-325 MG per tablet Take 1 tablet by mouth 2 times daily. blood glucose monitor strips Test 4 times a day DX E11.9 ONE TOUCH VERIO FLEX 200 strip 5    Lancets MISC 1 each by Does not apply route 4 times daily 100 each 3    tadalafil (CIALIS) 10 MG tablet Take 1 tablet by mouth daily as needed for Erectile Dysfunction 10 tablet 3    mometasone (NASONEX) 50 MCG/ACT nasal spray 2 sprays by Nasal route daily 1 each 3    diclofenac sodium (VOLTAREN) 1 % GEL Apply 2 g topically 2 times daily 1 g 5     No current facility-administered medications for this visit.        Allergies:  Penicillins, Iodine, Ketorolac tromethamine, Lyrica [pregabalin], and Sulfa antibiotics    Social History:  Social History     Occupational History    Not on file   Tobacco Use    Smoking status: Former     Packs/day: 0.25     Years: 19.00     Pack years: 4.75     Types: Cigarettes     Quit date:      Years since quittin.0    Smokeless tobacco: Never   Substance and Sexual Activity    Alcohol use: No    Drug use: No    Sexual activity: Not on file         Family History:   No family history on file. Physical Examination:  There were no vitals taken for this visit. Physical Exam  Vitals reviewed. Constitutional:       General: He is not in acute distress. Appearance: Normal appearance. He is not ill-appearing, toxic-appearing or diaphoretic. HENT:      Head: Normocephalic and atraumatic. Eyes:      General: No scleral icterus. Right eye: No discharge. Left eye: No discharge. Conjunctiva/sclera: Conjunctivae normal.   Neck:      Vascular: No carotid bruit. Cardiovascular:      Rate and Rhythm: Normal rate and regular rhythm. No extrasystoles are present. Chest Wall: PMI is not displaced. No thrill. Heart sounds: S1 normal and S2 normal. No murmur heard. No friction rub. No gallop. Pulmonary:      Effort: Pulmonary effort is normal. No tachypnea or respiratory distress. Breath sounds: Normal breath sounds. No stridor. No wheezing, rhonchi or rales. Chest:      Chest wall: No tenderness. Abdominal:      General: Bowel sounds are normal. There is no distension. Palpations: Abdomen is soft. There is no mass. Tenderness: There is no abdominal tenderness. There is no guarding. Musculoskeletal:         General: No swelling. Cervical back: Normal range of motion and neck supple. No rigidity. Right lower leg: No edema. Left lower leg: No edema. Skin:     General: Skin is warm and dry. Coloration: Skin is not jaundiced. Findings: No erythema or rash.    Neurological:      General: No focal deficit present. Mental Status: He is alert and oriented to person, place, and time. Mental status is at baseline. Psychiatric:         Mood and Affect: Mood normal.         Behavior: Behavior normal.         Thought Content: Thought content normal.         Labs:   CBC: No results found for: CBC   BMP: No results found for: BMP    BNP: No results found for: BNPINT   PT/INR: No results found for: PROTIME, INR, INR    APTT:  No results found for: APTT   CARDIAC ENZYMES: No results found for: CKTOTAL, CKMB, CKMBINDEX, TROPONINI   LIPID PANEL: No results found for: LIPIDPAN  LIVER PROFILE:   AST   Date Value Ref Range Status   10/18/2022 43 (H) 5 - 40 U/L Final     ALT   Date Value Ref Range Status   10/18/2022 74 (H) 5 - 41 U/L Final     Albumin   Date Value Ref Range Status   10/18/2022 4.5 3.5 - 5.2 g/dL Final              Imaging:    - EKG :  reviewed      ASSESSMENT and PLAN:    56year-old non-insulin-dependent diabetes mellitus, coronary artery equivalent, who is also morbidly obese has a prior history of COVID-19 infection in November 2022. Over the past year has been experiencing random episodes of chest fluttering, no correlation to exertion. Last episode was probably 2 weeks ago and occurs in about the same frequency. No active chest pain. Patient is however reasonably concerned about his cardiac health given his age and diabetes. Recommend CT calcium score for further evaluation and management of presumed coronary plaque burden. Further recommendations will follow and may include addition of aspirin and statin therapy. Electronically signed by Meg Cortez MD on 1/12/2023 at 1:45 PM    Meg Cortez MD, [place], [facility]  Noninvasive Cardiology Consultant    This dictation was generated by voice recognition computer software. Although all attempts are made to edit the dictation for accuracy, there may be errors in the transcription that are not intended.

## 2023-02-07 ENCOUNTER — HOSPITAL ENCOUNTER (OUTPATIENT)
Dept: CT IMAGING | Age: 58
Discharge: HOME | End: 2023-02-07
Payer: COMMERCIAL

## 2023-02-07 DIAGNOSIS — I25.10 CORONARY ARTERY DISEASE DUE TO LIPID RICH PLAQUE: ICD-10-CM

## 2023-02-07 DIAGNOSIS — I25.83 CORONARY ARTERY DISEASE DUE TO LIPID RICH PLAQUE: ICD-10-CM

## 2023-02-07 PROCEDURE — 75571 CT HRT W/O DYE W/CA TEST: CPT

## 2023-02-08 NOTE — RESULT ENCOUNTER NOTE
Calcium score minimal at 11.8 (exclusive to the right coronary artery). Continue aggressive risk factor modification with emphasis on weight loss by heart healthy diet and exercise as tolerated.

## 2023-02-17 DIAGNOSIS — R07.89 ATYPICAL CHEST PAIN: ICD-10-CM

## 2023-02-17 DIAGNOSIS — M54.16 LUMBAR RADICULOPATHY: ICD-10-CM

## 2023-02-17 DIAGNOSIS — E78.00 HYPERCHOLESTEREMIA: ICD-10-CM

## 2023-02-17 DIAGNOSIS — E11.00 TYPE 2 DIABETES MELLITUS WITH HYPEROSMOLARITY WITHOUT COMA, WITHOUT LONG-TERM CURRENT USE OF INSULIN (HCC): ICD-10-CM

## 2023-02-17 DIAGNOSIS — Z23 NEED FOR INFLUENZA VACCINATION: ICD-10-CM

## 2023-02-17 LAB
ALBUMIN SERPL-MCNC: 4.4 G/DL (ref 3.5–5.2)
ALP BLD-CCNC: 51 U/L (ref 40–130)
ALT SERPL-CCNC: 65 U/L (ref 5–41)
ANION GAP SERPL CALCULATED.3IONS-SCNC: 10 MMOL/L (ref 7–19)
AST SERPL-CCNC: 41 U/L (ref 5–40)
BILIRUB SERPL-MCNC: 0.5 MG/DL (ref 0.2–1.2)
BUN BLDV-MCNC: 14 MG/DL (ref 6–20)
CALCIUM SERPL-MCNC: 9.7 MG/DL (ref 8.6–10)
CHLORIDE BLD-SCNC: 104 MMOL/L (ref 98–111)
CHOLESTEROL, TOTAL: 221 MG/DL (ref 160–199)
CO2: 27 MMOL/L (ref 22–29)
CREAT SERPL-MCNC: 0.9 MG/DL (ref 0.5–1.2)
GFR SERPL CREATININE-BSD FRML MDRD: >60 ML/MIN/{1.73_M2}
GLUCOSE BLD-MCNC: 126 MG/DL (ref 74–109)
HBA1C MFR BLD: 7 % (ref 4–6)
HDLC SERPL-MCNC: 42 MG/DL (ref 55–121)
LDL CHOLESTEROL CALCULATED: 143 MG/DL
POTASSIUM SERPL-SCNC: 4.4 MMOL/L (ref 3.5–5)
SODIUM BLD-SCNC: 141 MMOL/L (ref 136–145)
TOTAL PROTEIN: 6.9 G/DL (ref 6.6–8.7)
TRIGL SERPL-MCNC: 178 MG/DL (ref 0–149)

## 2023-02-23 ENCOUNTER — OFFICE VISIT (OUTPATIENT)
Dept: INTERNAL MEDICINE | Age: 58
End: 2023-02-23
Payer: COMMERCIAL

## 2023-02-23 VITALS
WEIGHT: 315 LBS | HEART RATE: 61 BPM | OXYGEN SATURATION: 97 % | DIASTOLIC BLOOD PRESSURE: 82 MMHG | BODY MASS INDEX: 40.67 KG/M2 | SYSTOLIC BLOOD PRESSURE: 136 MMHG

## 2023-02-23 DIAGNOSIS — E78.00 HYPERCHOLESTEREMIA: Primary | ICD-10-CM

## 2023-02-23 DIAGNOSIS — E66.01 MORBID OBESITY DUE TO EXCESS CALORIES (HCC): ICD-10-CM

## 2023-02-23 DIAGNOSIS — Z79.4 TYPE 2 DIABETES MELLITUS WITHOUT COMPLICATION, WITH LONG-TERM CURRENT USE OF INSULIN (HCC): ICD-10-CM

## 2023-02-23 DIAGNOSIS — M19.079 OSTEOARTHRITIS OF ANKLE, UNSPECIFIED LATERALITY, UNSPECIFIED OSTEOARTHRITIS TYPE: ICD-10-CM

## 2023-02-23 DIAGNOSIS — E11.9 TYPE 2 DIABETES MELLITUS WITHOUT COMPLICATION, WITH LONG-TERM CURRENT USE OF INSULIN (HCC): ICD-10-CM

## 2023-02-23 PROCEDURE — 99214 OFFICE O/P EST MOD 30 MIN: CPT | Performed by: INTERNAL MEDICINE

## 2023-02-23 PROCEDURE — 3051F HG A1C>EQUAL 7.0%<8.0%: CPT | Performed by: INTERNAL MEDICINE

## 2023-02-23 RX ORDER — DICLOFENAC SODIUM 75 MG/1
75 TABLET, DELAYED RELEASE ORAL 2 TIMES DAILY
Qty: 180 TABLET | Refills: 1 | Status: SHIPPED | OUTPATIENT
Start: 2023-02-23

## 2023-02-23 SDOH — ECONOMIC STABILITY: FOOD INSECURITY: WITHIN THE PAST 12 MONTHS, THE FOOD YOU BOUGHT JUST DIDN'T LAST AND YOU DIDN'T HAVE MONEY TO GET MORE.: NEVER TRUE

## 2023-02-23 SDOH — ECONOMIC STABILITY: FOOD INSECURITY: WITHIN THE PAST 12 MONTHS, YOU WORRIED THAT YOUR FOOD WOULD RUN OUT BEFORE YOU GOT MONEY TO BUY MORE.: NEVER TRUE

## 2023-02-23 SDOH — ECONOMIC STABILITY: INCOME INSECURITY: HOW HARD IS IT FOR YOU TO PAY FOR THE VERY BASICS LIKE FOOD, HOUSING, MEDICAL CARE, AND HEATING?: NOT HARD AT ALL

## 2023-02-23 SDOH — ECONOMIC STABILITY: HOUSING INSECURITY
IN THE LAST 12 MONTHS, WAS THERE A TIME WHEN YOU DID NOT HAVE A STEADY PLACE TO SLEEP OR SLEPT IN A SHELTER (INCLUDING NOW)?: NO

## 2023-02-23 ASSESSMENT — PATIENT HEALTH QUESTIONNAIRE - PHQ9
SUM OF ALL RESPONSES TO PHQ QUESTIONS 1-9: 0
SUM OF ALL RESPONSES TO PHQ QUESTIONS 1-9: 0
SUM OF ALL RESPONSES TO PHQ9 QUESTIONS 1 & 2: 0
SUM OF ALL RESPONSES TO PHQ QUESTIONS 1-9: 0
2. FEELING DOWN, DEPRESSED OR HOPELESS: 0
1. LITTLE INTEREST OR PLEASURE IN DOING THINGS: 0
SUM OF ALL RESPONSES TO PHQ QUESTIONS 1-9: 0

## 2023-02-23 ASSESSMENT — ENCOUNTER SYMPTOMS
EYE ITCHING: 0
ABDOMINAL PAIN: 0
VOMITING: 0
WHEEZING: 0
SORE THROAT: 0
NAUSEA: 0
TROUBLE SWALLOWING: 0
EYE DISCHARGE: 0
ABDOMINAL DISTENTION: 0
SINUS PRESSURE: 0
SHORTNESS OF BREATH: 0
BACK PAIN: 0
BLOOD IN STOOL: 0

## 2023-02-23 NOTE — PROGRESS NOTES
[address] INTER[facility]  [address] [address]  [address] [address]  Dept: [phone]  Dept Fax: [phone]: [phone]      Visit Date: 2023    Jacqueline Holden a 56 y.o. male who presents today for:  Chief Complaint   Patient presents with    Diabetes Care Management         HPI:     Is in for follow-up on his diabetes. He feels well has no new complaints and had lab for review. Past Medical History:   Diagnosis Date    Arthritis     Back pain     Diabetes mellitus ([address]     Seasonal allergies       Past Surgical History:   Procedure Laterality Date    COLONOSCOPY N/A 04/15/2022    Dr oZë Yee-Apparent submucosal lesion noted ? Lipoma, r/o rule out adenomatous mucosal tissue. in the right colon-AP x 1, HP x 3, BCM x 1, 3 yr recall    CYST REMOVAL      right wrist    ENDOSCOPY, COLON, DIAGNOSTIC      TYMPANOSTOMY TUBE PLACEMENT      UPPER GASTROINTESTINAL ENDOSCOPY         No family history on file. Social History     Tobacco Use    Smoking status: Former     Packs/day: 0.25     Years: 19.00     Pack years: 4.75     Types: Cigarettes     Quit date:      Years since quittin.1    Smokeless tobacco: Never   Substance Use Topics    Alcohol use: No      Current Outpatient Medications   Medication Sig Dispense Refill    diclofenac (VOLTAREN) 75 MG EC tablet Take 1 tablet by mouth 2 times daily 180 tablet 1    metFORMIN (GLUCOPHAGE) 1000 MG tablet Take 1 tablet by mouth 2 times daily (with meals) 180 tablet 1    HYDROcodone-acetaminophen (NORCO) 5-325 MG per tablet Take 1 tablet by mouth 2 times daily.       blood glucose monitor strips Test 4 times a day DX E11.9 ONE TOUCH VERIO FLEX 200 strip 5    Lancets MISC 1 each by Does not apply route 4 times daily 100 each 3    tadalafil (CIALIS) 10 MG tablet Take 1 tablet by mouth daily as needed for Erectile Dysfunction 10 tablet 3    diclofenac sodium (VOLTAREN) 1 % GEL Apply 2 g topically 2 times daily 1 g 5     No current facility-administered medications for this visit. Allergies   Allergen Reactions    Penicillins Shortness Of Breath    Iodine Nausea Only    Ketorolac Tromethamine Hives and Itching    Lyrica [Pregabalin]     Sulfa Antibiotics Hives         Subjective:     Review of Systems   Constitutional:  Negative for activity change, appetite change, fatigue and fever. HENT:  Negative for congestion, hearing loss, sinus pressure, sore throat and trouble swallowing. Eyes:  Negative for discharge and itching. Respiratory:  Negative for shortness of breath and wheezing. Cardiovascular:  Negative for chest pain, palpitations and leg swelling. Gastrointestinal:  Negative for abdominal distention, abdominal pain, blood in stool, nausea and vomiting. Endocrine: Negative for cold intolerance, heat intolerance and polydipsia. Genitourinary:  Negative for flank pain, frequency, hematuria and urgency. Musculoskeletal:  Positive for arthralgias. Negative for back pain and joint swelling. Skin:  Negative for rash and wound. Allergic/Immunologic: Negative for environmental allergies and food allergies. Neurological:  Negative for dizziness, tremors, syncope, weakness, numbness and headaches. Hematological:  Negative for adenopathy. Psychiatric/Behavioral:  Negative for agitation and hallucinations. The patient is not nervous/anxious. Objective:      /82 (Site: Left Upper Arm)   Pulse 61   Wt (!) 361 lb (163.7 kg)   SpO2 97%   BMI 40.67 kg/m²    Physical Exam  Constitutional:       General: He is not in acute distress. Appearance: He is well-developed. He is not diaphoretic. HENT:      Head: Normocephalic and atraumatic. Right Ear: External ear normal.      Left Ear: External ear normal.      Nose: Nose normal.      Mouth/Throat:      Pharynx: No oropharyngeal exudate. Eyes:      General: No scleral icterus. Right eye: No discharge.          Left eye: No discharge. Conjunctiva/sclera: Conjunctivae normal.      Pupils: Pupils are equal, round, and reactive to light. Neck:      Thyroid: No thyromegaly. Vascular: No JVD. Trachea: No tracheal deviation. Cardiovascular:      Rate and Rhythm: Normal rate and regular rhythm. Heart sounds: Normal heart sounds. No murmur heard. No friction rub. No gallop. Pulmonary:      Effort: Pulmonary effort is normal. No respiratory distress. Breath sounds: Normal breath sounds. No wheezing or rales. Abdominal:      General: Bowel sounds are normal. There is no distension. Palpations: Abdomen is soft. There is no mass. Tenderness: There is no abdominal tenderness. There is no guarding or rebound. Musculoskeletal:         General: No tenderness or deformity. Normal range of motion. Cervical back: Normal range of motion and neck supple. Lymphadenopathy:      Cervical: No cervical adenopathy. Skin:     General: Skin is warm and dry. Coloration: Skin is not pale. Findings: No erythema or rash. Neurological:      Mental Status: He is alert and oriented to person, place, and time. Cranial Nerves: No cranial nerve deficit. Motor: No abnormal muscle tone. Coordination: Coordination normal.      Deep Tendon Reflexes: Reflexes are normal and symmetric. Reflexes normal.   Psychiatric:         Behavior: Behavior normal.         Thought Content: Thought content normal.         Judgment: Judgment normal.        Assessment:      Diagnosis Orders   1. Hypercholesteremia        2. Osteoarthritis of ankle, unspecified laterality, unspecified osteoarthritis type        3. Type 2 diabetes mellitus without complication, with long-term current use of insulin ([address]        4. Morbid obesity due to excess calories ([facility]                 Plan:     Hypercholesterol   Numbers are down.   He had lost some weight before [place] and is gained it back since then we talked about things he can do to try to get back on course with exercise and cardio. Arthritis which is stable. He takes diclofenac with good results and uses diclofenac gel as needed. Type 2 diabetes mellitus. His A1c is down to 7.0. He takes metformin 1000 mg twice daily and will continue the same. Obesity. We talked about weight loss and increasing exercise and lifestyle changes. He is going to try to do better we talked about joining a gym and perhaps some yoga. We will see him back in 6 months with lab. No follow-ups on file. Patient given educational materials- see patient instructions. Discussed use, benefit, and side effects of prescribedmedications. All patient questions answered. Pt voiced understanding. Reviewedhealth maintenance. Instructed to continue current medications, diet and exercise. Patient agreed with treatment plan. **This report has been created usingvoice recognition software. It may contain minor errors which are inherent in voicerecognition technology. **    Electronically signed by Cristino Khan MD on 2/23/2023 at 3:30 PM

## 2023-04-17 RX ORDER — TADALAFIL 10 MG/1
10 TABLET ORAL DAILY PRN
Qty: 10 TABLET | Refills: 3 | Status: SHIPPED | OUTPATIENT
Start: 2023-04-17

## 2023-04-17 NOTE — TELEPHONE ENCOUNTER
Alon Clark called to request a refill on his medication.       Last office visit : 2/23/2023   Next office visit : 6/22/2023     Requested Prescriptions     Pending Prescriptions Disp Refills    tadalafil (CIALIS) 10 MG tablet 10 tablet 3     Sig: Take 1 tablet by mouth daily as needed for Erectile Dysfunction            Sheron Sen MA

## 2023-06-15 DIAGNOSIS — E11.9 TYPE 2 DIABETES MELLITUS WITHOUT COMPLICATION, WITH LONG-TERM CURRENT USE OF INSULIN (HCC): ICD-10-CM

## 2023-06-15 DIAGNOSIS — M19.079 OSTEOARTHRITIS OF ANKLE, UNSPECIFIED LATERALITY, UNSPECIFIED OSTEOARTHRITIS TYPE: ICD-10-CM

## 2023-06-15 DIAGNOSIS — E66.01 MORBID OBESITY DUE TO EXCESS CALORIES (HCC): ICD-10-CM

## 2023-06-15 DIAGNOSIS — E78.00 HYPERCHOLESTEREMIA: ICD-10-CM

## 2023-06-15 DIAGNOSIS — Z79.4 TYPE 2 DIABETES MELLITUS WITHOUT COMPLICATION, WITH LONG-TERM CURRENT USE OF INSULIN (HCC): ICD-10-CM

## 2023-06-15 LAB
ALBUMIN SERPL-MCNC: 4.5 G/DL (ref 3.5–5.2)
ALP SERPL-CCNC: 53 U/L (ref 40–130)
ALT SERPL-CCNC: 64 U/L (ref 5–41)
ANION GAP SERPL CALCULATED.3IONS-SCNC: 17 MMOL/L (ref 7–19)
AST SERPL-CCNC: 43 U/L (ref 5–40)
BASOPHILS # BLD: 0.1 K/UL (ref 0–0.2)
BASOPHILS NFR BLD: 1.2 % (ref 0–1)
BILIRUB SERPL-MCNC: 0.5 MG/DL (ref 0.2–1.2)
BUN SERPL-MCNC: 18 MG/DL (ref 6–20)
CALCIUM SERPL-MCNC: 9.7 MG/DL (ref 8.6–10)
CHLORIDE SERPL-SCNC: 101 MMOL/L (ref 98–111)
CHOLEST SERPL-MCNC: 228 MG/DL (ref 160–199)
CO2 SERPL-SCNC: 22 MMOL/L (ref 22–29)
CREAT SERPL-MCNC: 0.9 MG/DL (ref 0.5–1.2)
EOSINOPHIL # BLD: 0.4 K/UL (ref 0–0.6)
EOSINOPHIL NFR BLD: 3.7 % (ref 0–5)
ERYTHROCYTE [DISTWIDTH] IN BLOOD BY AUTOMATED COUNT: 13.7 % (ref 11.5–14.5)
GLUCOSE SERPL-MCNC: 156 MG/DL (ref 74–109)
HBA1C MFR BLD: 7.4 % (ref 4–6)
HCT VFR BLD AUTO: 48.6 % (ref 42–52)
HDLC SERPL-MCNC: 42 MG/DL (ref 55–121)
HGB BLD-MCNC: 15.1 G/DL (ref 14–18)
IMM GRANULOCYTES # BLD: 0.1 K/UL
LDLC SERPL CALC-MCNC: 150 MG/DL
LYMPHOCYTES # BLD: 3.2 K/UL (ref 1.1–4.5)
LYMPHOCYTES NFR BLD: 32.1 % (ref 20–40)
MCH RBC QN AUTO: 27.8 PG (ref 27–31)
MCHC RBC AUTO-ENTMCNC: 31.1 G/DL (ref 33–37)
MCV RBC AUTO: 89.3 FL (ref 80–94)
MONOCYTES # BLD: 0.8 K/UL (ref 0–0.9)
MONOCYTES NFR BLD: 8.5 % (ref 0–10)
NEUTROPHILS # BLD: 5.3 K/UL (ref 1.5–7.5)
NEUTS SEG NFR BLD: 53.6 % (ref 50–65)
PLATELET # BLD AUTO: 235 K/UL (ref 130–400)
PMV BLD AUTO: 11.2 FL (ref 9.4–12.4)
POTASSIUM SERPL-SCNC: 4.8 MMOL/L (ref 3.5–5)
PROT SERPL-MCNC: 6.9 G/DL (ref 6.6–8.7)
PSA SERPL-MCNC: 1.8 NG/ML (ref 0–4)
RBC # BLD AUTO: 5.44 M/UL (ref 4.7–6.1)
SODIUM SERPL-SCNC: 140 MMOL/L (ref 136–145)
TRIGL SERPL-MCNC: 178 MG/DL (ref 0–149)
WBC # BLD AUTO: 9.9 K/UL (ref 4.8–10.8)

## 2023-06-22 ENCOUNTER — OFFICE VISIT (OUTPATIENT)
Dept: INTERNAL MEDICINE | Age: 58
End: 2023-06-22
Payer: COMMERCIAL

## 2023-06-22 VITALS
HEIGHT: 78 IN | OXYGEN SATURATION: 96 % | WEIGHT: 315 LBS | BODY MASS INDEX: 36.45 KG/M2 | HEART RATE: 79 BPM | DIASTOLIC BLOOD PRESSURE: 80 MMHG | SYSTOLIC BLOOD PRESSURE: 130 MMHG

## 2023-06-22 DIAGNOSIS — E78.00 HYPERCHOLESTEREMIA: Primary | ICD-10-CM

## 2023-06-22 DIAGNOSIS — E11.00 TYPE 2 DIABETES MELLITUS WITH HYPEROSMOLARITY WITHOUT COMA, WITHOUT LONG-TERM CURRENT USE OF INSULIN (HCC): ICD-10-CM

## 2023-06-22 DIAGNOSIS — M19.071 PRIMARY OSTEOARTHRITIS OF RIGHT ANKLE: ICD-10-CM

## 2023-06-22 PROCEDURE — 3051F HG A1C>EQUAL 7.0%<8.0%: CPT | Performed by: INTERNAL MEDICINE

## 2023-06-22 PROCEDURE — 99214 OFFICE O/P EST MOD 30 MIN: CPT | Performed by: INTERNAL MEDICINE

## 2023-06-22 ASSESSMENT — ENCOUNTER SYMPTOMS
TROUBLE SWALLOWING: 0
EYE DISCHARGE: 0
SHORTNESS OF BREATH: 0
SINUS PRESSURE: 0
WHEEZING: 0
BLOOD IN STOOL: 0
EYE ITCHING: 0
BACK PAIN: 0
ABDOMINAL PAIN: 0
SORE THROAT: 0
ABDOMINAL DISTENTION: 0
VOMITING: 0
NAUSEA: 0

## 2023-06-22 NOTE — PROGRESS NOTES
[facility] PHYSICIAN SERVICES  [facility] INTERNAL MEDICINE  [address]  [address] [address]  Dept: [phone]  Dept Fax: [phone]: [phone]      Visit Date: 2023    Aleks Holden a 56 y.o. male who presents today for:  Chief Complaint   Patient presents with    Annual Exam         HPI:     For his annual exam.  He had lab for review. Has no complaints has not been as compliant with his diet. Past Medical History:   Diagnosis Date    Arthritis     Back pain     Diabetes mellitus ([address]     Seasonal allergies       Past Surgical History:   Procedure Laterality Date    COLONOSCOPY N/A 04/15/2022    Dr Darell Yee-Apparent submucosal lesion noted ? Lipoma, r/o rule out adenomatous mucosal tissue. in the right colon-AP x 1, HP x 3, BCM x 1, 3 yr recall    CYST REMOVAL      right wrist    ENDOSCOPY, COLON, DIAGNOSTIC      TYMPANOSTOMY TUBE PLACEMENT      UPPER GASTROINTESTINAL ENDOSCOPY         No family history on file. Social History     Tobacco Use    Smoking status: Former     Packs/day: 0.25     Years: 19.00     Pack years: 4.75     Types: Cigarettes     Quit date:      Years since quittin.4    Smokeless tobacco: Never   Substance Use Topics    Alcohol use: No      Current Outpatient Medications   Medication Sig Dispense Refill    tadalafil (CIALIS) 10 MG tablet Take 1 tablet by mouth daily as needed for Erectile Dysfunction 10 tablet 3    diclofenac (VOLTAREN) 75 MG EC tablet Take 1 tablet by mouth 2 times daily 180 tablet 1    metFORMIN (GLUCOPHAGE) 1000 MG tablet Take 1 tablet by mouth 2 times daily (with meals) 180 tablet 1    HYDROcodone-acetaminophen (NORCO) 5-325 MG per tablet Take 1 tablet by mouth 2 times daily.       blood glucose monitor strips Test 4 times a day DX E11.9 ONE TOUCH VERIO FLEX 200 strip 5    Lancets MISC 1 each by Does not apply route 4 times daily 100 each 3    diclofenac sodium (VOLTAREN) 1 % GEL Apply 2 g topically 2 times daily 1

## 2023-07-18 ENCOUNTER — OFFICE VISIT (OUTPATIENT)
Dept: INTERNAL MEDICINE | Age: 58
End: 2023-07-18
Payer: COMMERCIAL

## 2023-07-18 VITALS
OXYGEN SATURATION: 96 % | DIASTOLIC BLOOD PRESSURE: 88 MMHG | SYSTOLIC BLOOD PRESSURE: 146 MMHG | HEART RATE: 60 BPM | WEIGHT: 315 LBS | BODY MASS INDEX: 41.23 KG/M2

## 2023-07-18 DIAGNOSIS — L60.0 INGROWN TOENAIL: Primary | ICD-10-CM

## 2023-07-18 PROCEDURE — 99214 OFFICE O/P EST MOD 30 MIN: CPT | Performed by: INTERNAL MEDICINE

## 2023-07-18 RX ORDER — DOXYCYCLINE 100 MG/1
100 TABLET ORAL 2 TIMES DAILY
Qty: 20 TABLET | Refills: 0 | Status: SHIPPED | OUTPATIENT
Start: 2023-07-18 | End: 2023-07-28

## 2023-08-30 ENCOUNTER — TELEPHONE (OUTPATIENT)
Dept: PODIATRY | Facility: CLINIC | Age: 58
End: 2023-08-30

## 2023-08-30 NOTE — TELEPHONE ENCOUNTER
Provider: DR PADRON   Caller: BETHANY GONZALEZ   Relationship to Patient: SELF    Phone Number: [phone] (home)     Reason for Call: PATIENT WAS ESTABLISHED WITH [facility] AND WAS ON THE SCHEDULE FOR [facility] [employer] NAIL 11/09/23. PATIENT IS WANTING TO KNOW IF HE CAN GET IN AROUND THE SAME TIME FRAME     PATIENT IS REQUESTING A CALL BACK  PLEASE ADVISE

## 2023-09-05 NOTE — PROGRESS NOTES
[facility] - PODIATRY    Today's Date: 09/13/2023     Patient Name: Gordy Erwin  MRN: [id number]  CSN: [id number]  PCP: Hemal Gutierrez MD  Referring Provider: Hemal Gutierrez MD    SUBJECTIVE     Chief Complaint   Patient presents with    Establish Care     Hemal Gutierrez MD 07/18/2023 NEW PATIENT - Ingrown toenail.-pt states he is here today to become established for diabetic foot and nail care/states his ingrown nail has gotten better/had foot surgery with Dr Cabral in the past 2017-pt reports pain level at 5/10     Diabetes     HPI: Gordy Erwin, a 58 y.o.male, comes to clinic as a(n) new patient presenting for diabetic foot exam and complaining of toenail/callus issues. Patient has h/o DM, obesity . Patient is NIDDM and unsure of last BG level.  Patient presents for complaints of previous issue with ingrown toenail of the right hallux nail.  Patient states that he has been on oral antibiotic therapy and that the nail has actually improved.  He states that he is diabetic with some neuropathy.  Also has issues with his back with the right lower extremity being more numb and problematic in the left.  Previously had surgery in 2017 with Dr. Cabral due to a ruptured posterior tibial tendon.  States that he continues to have some issues with foot/ankle; no recent x-rays or other imaging.  Admits pain at 5/10 level and described as aching and nagging. Relates previous treatment(s) including foot/ankle surgery in 2017 per Dr. Cabral, oral antibiotics . Denies any constitutional symptoms. No other pedal complaints at this time.    Past Medical History:   Diagnosis Date    Diabetes mellitus     Obesity      Past Surgical History:   Procedure Laterality Date    FOOT SURGERY Right     GANGLION CYST EXCISION       Family History   Problem Relation Age of Onset    Heart disease Mother     Osteoporosis Mother     Suicidality Father     Bipolar disorder Father     No Known  Problems Brother      Social History     Socioeconomic History    Marital status:    Tobacco Use    Smoking status: Former     Types: Cigarettes     Quit date: 10/2/2012     Years since quitting: 10.9     Passive exposure: Past    Smokeless tobacco: Never   Vaping Use    Vaping Use: Never used   Substance and Sexual Activity    Alcohol use: No     Comment: rarely    Drug use: No    Sexual activity: Defer     Allergies   Allergen Reactions    Penicillins Shortness Of Breath    Contrast Dye (Echo Or Unknown Ct/Mr) Nausea Only    Iodine Nausea Only    Pregabalin Hives    Sulfa Antibiotics Hives    Toradol [Ketorolac Tromethamine] Hives and Itching     Current Outpatient Medications   Medication Sig Dispense Refill    cyclobenzaprine (FLEXERIL) 10 MG tablet Take 1 tablet by mouth 2 (Two) Times a Day As Needed for Muscle Spasms. 60 tablet 0    diclofenac (VOLTAREN) 1 % gel gel Apply 4 g topically to the appropriate area as directed 4 (Four) Times a Day As Needed.      diclofenac (VOLTAREN) 75 MG EC tablet 1 tablet.      Lancets (OneTouch Delica Plus [id number]) misc 1 each by Other route Daily.      lidocaine (LIDODERM) 5 % Place 1 patch on the skin as directed by provider Daily. Remove & Discard patch within 12 hours or as directed by MD 15 each 0    metFORMIN (GLUCOPHAGE) 500 MG tablet Take 2 tablets by mouth Daily With Breakfast.      OneTouch Verio test strip 1 each by Other route 4 (Four) Times a Day. (Patient not taking: Reported on 9/13/2023)      pregabalin (Lyrica) 50 MG capsule Take 1 capsule by mouth 3 (Three) Times a Day for 14 days. 42 capsule 0    tadalafil (CIALIS) 10 MG tablet Take 1 tablet by mouth Daily As Needed. (Patient not taking: Reported on 9/13/2023)       No current facility-administered medications for this visit.     Review of Systems   Constitutional:  Negative for chills and fever.   HENT:  Negative for congestion.    Respiratory:  Negative for shortness of breath.    Cardiovascular:   Negative for chest pain and leg swelling.   Gastrointestinal:  Negative for constipation, diarrhea, nausea and vomiting.   Musculoskeletal:  Positive for arthralgias and back pain. Negative for myalgias.   Skin:  Negative for wound.   Neurological:  Positive for numbness.     OBJECTIVE     Vitals:    09/13/23 1020   BP: 124/80   Pulse: 61   SpO2: 98%       PHYSICAL EXAM  GEN:   Accompanied by none.     Foot/Ankle Exam    GENERAL  Diabetic foot exam performed    Appearance:  appears stated age and obese  Orientation:  AAOx3  Affect:  appropriate  Gait:  unimpaired  Assistance:  independent  Right shoe gear: casual shoe  Left shoe gear: casual shoe    VASCULAR     Right Foot Vascularity   Dorsalis pedis:  2+  Posterior tibial:  2+  Skin temperature:  warm  Edema grading:  Trace and non-pitting (perimalleolar)  CFT:  3  Pedal hair growth:  Present  Varicosities:  spider veins     Left Foot Vascularity   Dorsalis pedis:  2+  Posterior tibial:  2+  Skin temperature:  warm  Edema grading:  None  CFT:  3  Pedal hair growth:  Present  Varicosities:  spider veins     NEUROLOGIC     Right Foot Neurologic   Light touch sensation: diminished  Vibratory sensation: diminished  Hot/Cold sensation: diminished  Protective Sensation using [place] Monofilament:   Sites intact: 3  Sites tested: 10     Left Foot Neurologic   Light touch sensation: diminished  Vibratory sensation: diminished  Hot/Cold sensation:  diminished  Protective Sensation using [place] Monofilament:   Sites intact: 8  Sites tested: 10    MUSCULOSKELETAL     Right Foot Musculoskeletal   Tenderness:  lateral malleolus tenderness and posterior tibial tendon tenderness    Arch:  Normal  Hallux valgus: No       Left Foot Musculoskeletal   Tenderness:  none  Arch:  Normal  Hallux valgus: No      MUSCLE STRENGTH     Right Foot Muscle Strength   Foot dorsiflexion:  5  Foot plantar flexion:  5  Foot inversion:  4+  Foot eversion:  5     Left Foot Muscle  Strength   Foot dorsiflexion:  5  Foot plantar flexion:  5  Foot inversion:  5  Foot eversion:  5    RANGE OF MOTION     Right Foot Range of Motion   Foot and ankle ROM within normal limits       Left Foot Range of Motion   Foot and ankle ROM within normal limits      DERMATOLOGIC      Right Foot Dermatologic   Skin  Right foot skin is intact.   Nails  1.  Normal.  2.  Normal.  3.  Normal.  4.  Normal.  5.  Normal.     Left Foot Dermatologic   Skin  Left foot skin is intact.   Nails  1.  Normal.  2.  Normal.  3.  Normal.  4.  Normal.  5.  Normal.    RADIOLOGY/NUCLEAR:  No results found.    LABORATORY/CULTURE RESULTS:      PATHOLOGY RESULTS:       ASSESSMENT/PLAN     Diagnoses and all orders for this visit:    1. Chronic pain in right foot (Primary)  -     XR Foot 3+ View Right    2. Chronic pain of right ankle  -     XR Ankle 3+ View Right    3. Type 2 diabetes mellitus with diabetic neuropathy, without long-term current use of insulin    4. Encounter for diabetic foot exam      Comprehensive lower extremity examination and evaluation was performed.  Discussed findings and treatment plan including risks, benefits, and treatment options with patient in detail. Patient agreed with treatment plan.  Diabetic foot exam performed.   Patient may maintain nails and calluses at home utilizing emery board or pumice stone between visits as needed  Reviewed at home diabetic foot care including daily foot checks   Will obtain updated x-rays of the right foot and ankle to establish baseline.  Continue diabetic monitoring and control under direction of PCP.  If right great toenail becomes problematic again patient advised to call back and reschedule appointment for evaluation of acute on toenail.  An After Visit Summary was printed and given to the patient at discharge, including (if requested) any available informative/educational handouts regarding diagnosis, treatment, or medications. All questions were answered to  patient/family satisfaction. Should symptoms fail to improve or worsen they agree to call or return to clinic or to go to the Emergency Department. Discussed the importance of following up with any needed screening tests/labs/specialist appointments and any requested follow-up recommended by me today. Importance of maintaining follow-up discussed and patient accepts that missed appointments can delay diagnosis and potentially lead to worsening of conditions.  Return in about 1 year (around 9/13/2024) for Annual Diabetic Foot Exam., or sooner if acute issues arise.        This document has been electronically signed by MIKE Brown on September 13, 2023 11:05 CDT

## 2023-09-12 ENCOUNTER — TELEPHONE (OUTPATIENT)
Dept: PODIATRY | Facility: CLINIC | Age: 58
End: 2023-09-12
Payer: COMMERCIAL

## 2023-09-12 NOTE — TELEPHONE ENCOUNTER
Called patient regarding appt on 09/12/2023. Left message for patient to return call if any questions or concerns arise.

## 2023-09-13 ENCOUNTER — OFFICE VISIT (OUTPATIENT)
Dept: PODIATRY | Facility: CLINIC | Age: 58
End: 2023-09-13
Payer: COMMERCIAL

## 2023-09-13 ENCOUNTER — HOSPITAL ENCOUNTER (OUTPATIENT)
Dept: GENERAL RADIOLOGY | Facility: HOSPITAL | Age: 58
Discharge: HOME | End: 2023-09-13
Payer: COMMERCIAL

## 2023-09-13 VITALS
BODY MASS INDEX: 36.45 KG/M2 | WEIGHT: 315 LBS | DIASTOLIC BLOOD PRESSURE: 80 MMHG | SYSTOLIC BLOOD PRESSURE: 124 MMHG | OXYGEN SATURATION: 98 % | HEART RATE: 61 BPM | HEIGHT: 78 IN

## 2023-09-13 DIAGNOSIS — E11.40 TYPE 2 DIABETES MELLITUS WITH DIABETIC NEUROPATHY, WITHOUT LONG-TERM CURRENT USE OF INSULIN: ICD-10-CM

## 2023-09-13 DIAGNOSIS — M25.571 CHRONIC PAIN OF RIGHT ANKLE: ICD-10-CM

## 2023-09-13 DIAGNOSIS — G89.29 CHRONIC PAIN OF RIGHT ANKLE: ICD-10-CM

## 2023-09-13 DIAGNOSIS — E11.9 ENCOUNTER FOR DIABETIC FOOT EXAM: ICD-10-CM

## 2023-09-13 DIAGNOSIS — G89.29 CHRONIC PAIN IN RIGHT FOOT: Primary | ICD-10-CM

## 2023-09-13 DIAGNOSIS — M79.671 CHRONIC PAIN IN RIGHT FOOT: Primary | ICD-10-CM

## 2023-09-13 PROCEDURE — 73610 X-RAY EXAM OF ANKLE: CPT

## 2023-09-13 PROCEDURE — 73630 X-RAY EXAM OF FOOT: CPT

## 2023-09-13 RX ORDER — DICLOFENAC SODIUM 75 MG/1
75 TABLET, DELAYED RELEASE ORAL
COMMUNITY
Start: 2023-08-21

## 2023-09-18 DIAGNOSIS — Z79.4 TYPE 2 DIABETES MELLITUS WITHOUT COMPLICATION, WITH LONG-TERM CURRENT USE OF INSULIN (HCC): ICD-10-CM

## 2023-09-18 DIAGNOSIS — M19.079 OSTEOARTHRITIS OF ANKLE, UNSPECIFIED LATERALITY, UNSPECIFIED OSTEOARTHRITIS TYPE: ICD-10-CM

## 2023-09-18 DIAGNOSIS — E11.9 TYPE 2 DIABETES MELLITUS WITHOUT COMPLICATION, WITH LONG-TERM CURRENT USE OF INSULIN (HCC): ICD-10-CM

## 2023-09-19 RX ORDER — DICLOFENAC SODIUM 75 MG/1
75 TABLET, DELAYED RELEASE ORAL 2 TIMES DAILY
Qty: 180 TABLET | Refills: 1 | Status: SHIPPED | OUTPATIENT
Start: 2023-09-19

## 2023-09-19 NOTE — TELEPHONE ENCOUNTER
Last OV 7/18/2023  Next OV 1/4/2024      Requested Prescriptions     Pending Prescriptions Disp Refills    diclofenac (VOLTAREN) 75 MG EC tablet 180 tablet 1     Sig: Take 1 tablet by mouth 2 times daily    metFORMIN (GLUCOPHAGE) 1000 MG tablet 180 tablet 1     Sig: Take 1 tablet by mouth 2 times daily (with meals)

## 2023-09-20 ENCOUNTER — TELEPHONE (OUTPATIENT)
Dept: PODIATRY | Facility: CLINIC | Age: 58
End: 2023-09-20
Payer: COMMERCIAL

## 2023-09-20 NOTE — TELEPHONE ENCOUNTER
Called pt with imaging results, Stable hardware changes from previous surgery. Degenerative/arthritic changes noted throughout. May follow-up with Dr. Mckeon sooner than 1 year diabetic foot exam if desired to further discuss.   Pt stated understanding

## 2023-11-20 DIAGNOSIS — M19.079 OSTEOARTHRITIS OF ANKLE, UNSPECIFIED LATERALITY, UNSPECIFIED OSTEOARTHRITIS TYPE: ICD-10-CM

## 2023-11-21 RX ORDER — DICLOFENAC SODIUM 75 MG/1
75 TABLET, DELAYED RELEASE ORAL 2 TIMES DAILY
Qty: 180 TABLET | Refills: 1 | Status: SHIPPED | OUTPATIENT
Start: 2023-11-21

## 2023-11-21 NOTE — TELEPHONE ENCOUNTER
Last OV 7/18/2023  Next OV 1/4/2024      Requested Prescriptions     Pending Prescriptions Disp Refills    diclofenac (VOLTAREN) 75 MG EC tablet 180 tablet 1     Sig: Take 1 tablet by mouth 2 times daily

## 2024-01-02 DIAGNOSIS — M19.071 PRIMARY OSTEOARTHRITIS OF RIGHT ANKLE: ICD-10-CM

## 2024-01-02 DIAGNOSIS — E11.00 TYPE 2 DIABETES MELLITUS WITH HYPEROSMOLARITY WITHOUT COMA, WITHOUT LONG-TERM CURRENT USE OF INSULIN (HCC): ICD-10-CM

## 2024-01-02 DIAGNOSIS — E78.00 HYPERCHOLESTEREMIA: ICD-10-CM

## 2024-01-02 LAB
ALBUMIN SERPL-MCNC: 4.6 G/DL (ref 3.5–5.2)
ALP SERPL-CCNC: 57 U/L (ref 40–130)
ALT SERPL-CCNC: 82 U/L (ref 5–41)
ANION GAP SERPL CALCULATED.3IONS-SCNC: 16 MMOL/L (ref 7–19)
AST SERPL-CCNC: 63 U/L (ref 5–40)
BILIRUB SERPL-MCNC: 0.5 MG/DL (ref 0.2–1.2)
CALCIUM SERPL-MCNC: 10.2 MG/DL (ref 8.6–10)
CHLORIDE SERPL-SCNC: 104 MMOL/L (ref 98–111)
CHOLEST SERPL-MCNC: 242 MG/DL (ref 160–199)
CO2 SERPL-SCNC: 24 MMOL/L (ref 22–29)
CREAT SERPL-MCNC: 1 MG/DL (ref 0.5–1.2)
GLUCOSE SERPL-MCNC: 149 MG/DL (ref 74–109)
HBA1C MFR BLD: 8.6 % (ref 4–6)
HDLC SERPL-MCNC: 43 MG/DL (ref 55–121)
LDLC SERPL CALC-MCNC: 152 MG/DL
POTASSIUM SERPL-SCNC: 4.9 MMOL/L (ref 3.5–5)
PROT SERPL-MCNC: 7.1 G/DL (ref 6.6–8.7)
SODIUM SERPL-SCNC: 144 MMOL/L (ref 136–145)
TRIGL SERPL-MCNC: 233 MG/DL (ref 0–149)

## 2024-01-04 ENCOUNTER — OFFICE VISIT (OUTPATIENT)
Dept: INTERNAL MEDICINE | Age: 59
End: 2024-01-04
Payer: COMMERCIAL

## 2024-01-04 VITALS
OXYGEN SATURATION: 96 % | SYSTOLIC BLOOD PRESSURE: 124 MMHG | BODY MASS INDEX: 41.12 KG/M2 | DIASTOLIC BLOOD PRESSURE: 78 MMHG | HEART RATE: 77 BPM | WEIGHT: 315 LBS

## 2024-01-04 DIAGNOSIS — M19.071 PRIMARY OSTEOARTHRITIS OF RIGHT ANKLE: Primary | ICD-10-CM

## 2024-01-04 DIAGNOSIS — M54.16 LUMBAR RADICULOPATHY: ICD-10-CM

## 2024-01-04 DIAGNOSIS — E11.00 TYPE 2 DIABETES MELLITUS WITH HYPEROSMOLARITY WITHOUT COMA, WITHOUT LONG-TERM CURRENT USE OF INSULIN (HCC): ICD-10-CM

## 2024-01-04 PROCEDURE — 3052F HG A1C>EQUAL 8.0%<EQUAL 9.0%: CPT | Performed by: INTERNAL MEDICINE

## 2024-01-04 PROCEDURE — 99214 OFFICE O/P EST MOD 30 MIN: CPT | Performed by: INTERNAL MEDICINE

## 2024-01-04 ASSESSMENT — ENCOUNTER SYMPTOMS
EYE ITCHING: 0
SINUS PRESSURE: 0
SHORTNESS OF BREATH: 0
BLOOD IN STOOL: 0
TROUBLE SWALLOWING: 0
BACK PAIN: 0
VOMITING: 0
ABDOMINAL PAIN: 0
WHEEZING: 0
EYE DISCHARGE: 0
ABDOMINAL DISTENTION: 0
SORE THROAT: 0
NAUSEA: 0

## 2024-01-04 ASSESSMENT — PATIENT HEALTH QUESTIONNAIRE - PHQ9
SUM OF ALL RESPONSES TO PHQ QUESTIONS 1-9: 0
SUM OF ALL RESPONSES TO PHQ9 QUESTIONS 1 & 2: 0
SUM OF ALL RESPONSES TO PHQ QUESTIONS 1-9: 0
2. FEELING DOWN, DEPRESSED OR HOPELESS: 0
SUM OF ALL RESPONSES TO PHQ QUESTIONS 1-9: 0
SUM OF ALL RESPONSES TO PHQ QUESTIONS 1-9: 0
1. LITTLE INTEREST OR PLEASURE IN DOING THINGS: 0

## 2024-01-04 NOTE — PROGRESS NOTES
General: He is not in acute distress.     Appearance: He is well-developed. He is not diaphoretic.   HENT:      Head: Normocephalic and atraumatic.      Right Ear: External ear normal.      Left Ear: External ear normal.      Nose: Nose normal.      Mouth/Throat:      Pharynx: No oropharyngeal exudate.   Eyes:      General: No scleral icterus.        Right eye: No discharge.         Left eye: No discharge.      Conjunctiva/sclera: Conjunctivae normal.      Pupils: Pupils are equal, round, and reactive to light.   Neck:      Thyroid: No thyromegaly.      Vascular: No JVD.      Trachea: No tracheal deviation.   Cardiovascular:      Rate and Rhythm: Normal rate and regular rhythm.      Heart sounds: Normal heart sounds. No murmur heard.     No friction rub. No gallop.   Pulmonary:      Effort: Pulmonary effort is normal. No respiratory distress.      Breath sounds: Normal breath sounds. No wheezing or rales.   Abdominal:      General: Bowel sounds are normal. There is no distension.      Palpations: Abdomen is soft. There is no mass.      Tenderness: There is no abdominal tenderness. There is no guarding or rebound.   Musculoskeletal:         General: No tenderness or deformity. Normal range of motion.      Cervical back: Normal range of motion and neck supple.   Lymphadenopathy:      Cervical: No cervical adenopathy.   Skin:     General: Skin is warm and dry.      Coloration: Skin is not pale.      Findings: No erythema or rash.   Neurological:      Mental Status: He is alert and oriented to person, place, and time.      Cranial Nerves: No cranial nerve deficit.      Motor: No abnormal muscle tone.      Coordination: Coordination normal.      Deep Tendon Reflexes: Reflexes are normal and symmetric. Reflexes normal.   Psychiatric:         Behavior: Behavior normal.         Thought Content: Thought content normal.         Judgment: Judgment normal.          Assessment:      Diagnosis Orders   1. Primary osteoarthritis

## 2024-01-30 ENCOUNTER — HOSPITAL ENCOUNTER (OUTPATIENT)
Dept: PAIN MANAGEMENT | Age: 59
Discharge: HOME | End: 2024-01-30
Payer: COMMERCIAL

## 2024-01-30 VITALS
TEMPERATURE: 98.3 F | SYSTOLIC BLOOD PRESSURE: 149 MMHG | OXYGEN SATURATION: 96 % | WEIGHT: 315 LBS | DIASTOLIC BLOOD PRESSURE: 84 MMHG | BODY MASS INDEX: 36.45 KG/M2 | RESPIRATION RATE: 18 BRPM | HEIGHT: 78 IN | HEART RATE: 74 BPM

## 2024-01-30 DIAGNOSIS — M79.18 MYOFASCIAL MUSCLE PAIN: ICD-10-CM

## 2024-01-30 DIAGNOSIS — Z02.89 PAIN MANAGEMENT CONTRACT AGREEMENT: ICD-10-CM

## 2024-01-30 DIAGNOSIS — M47.816 FACET ARTHROPATHY, LUMBAR: ICD-10-CM

## 2024-01-30 DIAGNOSIS — M54.16 LUMBAR RADICULOPATHY: Primary | ICD-10-CM

## 2024-01-30 PROCEDURE — 99215 OFFICE O/P EST HI 40 MIN: CPT

## 2024-01-30 PROCEDURE — 99214 OFFICE O/P EST MOD 30 MIN: CPT

## 2024-01-30 RX ORDER — HYDROCODONE BITARTRATE AND ACETAMINOPHEN 5; 325 MG/1; MG/1
1 TABLET ORAL 2 TIMES DAILY PRN
Qty: 60 TABLET | Refills: 0 | Status: SHIPPED | OUTPATIENT
Start: 2024-01-30 | End: 2024-02-29

## 2024-01-30 RX ORDER — DULOXETIN HYDROCHLORIDE 20 MG/1
20 CAPSULE, DELAYED RELEASE ORAL DAILY
Qty: 30 CAPSULE | Refills: 2 | Status: SHIPPED | OUTPATIENT
Start: 2024-01-30

## 2024-01-30 ASSESSMENT — PAIN DESCRIPTION - LOCATION: LOCATION: BACK;ANKLE

## 2024-01-30 ASSESSMENT — PAIN DESCRIPTION - PAIN TYPE: TYPE: CHRONIC PAIN

## 2024-01-30 ASSESSMENT — ENCOUNTER SYMPTOMS
BOWEL INCONTINENCE: 0
GASTROINTESTINAL NEGATIVE: 1
BACK PAIN: 1
RESPIRATORY NEGATIVE: 1
EYES NEGATIVE: 1

## 2024-01-30 ASSESSMENT — PAIN SCALES - GENERAL: PAINLEVEL_OUTOF10: 6

## 2024-01-30 ASSESSMENT — PAIN DESCRIPTION - DIRECTION: RADIATING_TOWARDS: RIGHT

## 2024-01-30 NOTE — H&P
History and Physical    Patient Name: Kenneth Rowell    MR #: [id number]    Account #:[id number]    : 1965    Age: 58 y.o.    Sex: male    Date: 2024    PCP: Janusz Ruiz MD    Referring Provider: Dr. Ruiz     Chief Complaint:   Chief Complaint   Patient presents with    Back Pain     New pt referral from Dr. Ruiz       History of Present Illness:   The patient is a 58 y.o. male who presents with referral from Dr. Ruiz with primary complaints of low back pain with radiculopathy. Patient previously seen at [facility] but they no longer offer pain management services.     Patient has back pain started problem is longstanding, Caused by no known injury, Quality: aching, sharp, dull, stabbing, knife-like, burning, stiffness, stretching sensation, tearing sensation, cramping, like an electric shock, throbbing, and squeezing, Behavior:  constant, Worsens with: coughing, sneezing, straining, deep breathing, flexion, extension, sitting, standing, lifting, twisting, and exercise, Improves with: medication    Patient rates pain 6    Patient has pain that radiates down from the low back to the with radiation   Patient has more pain getting up from a seated position than the act of setting down. Yes   Patient has 50% of pain in the back and 50% in the leg(s).   Patient leans on shopping cart or leans forward when sitting in a chair to relieve low back pain. Yes   Patient has to sleep in a recliner or in an inclined position due to pain. Yes   Pain worsens going up and downstairs. Yes Patient has to take one step at a time due to pain. Yes   Patient is a side sleeper. Yes  Patient tosses and turns at night due to pain. Yes Patient sleeps with a pillow between his/her knees to help lessen pain. Yes Patient has pain that radiates into the groin. Yes    Patient has to use assistive devices due to pain No. Patient uses a N/A     Does pain affect ADLs Yes The following ADL's increases pain or patient is unable

## 2024-01-30 NOTE — PROGRESS NOTES
Clinic Documentation      Education Provided:  [x] Review of Eriberto  [x] Agreement Review  [x] PEG Score Calculated [x] PHQ Score Calculated [x] ORT Score Calculated    [x] Compliance Issues Discussed [] Cognitive Behavior Needs [x] Exercise [] Review of Test [] Financial Issues  [x] Tobacco/Alcohol Use Reviewed [x] Teaching [x] New Patient [] Picture Obtained    Physician Plan:  [] Outgoing Referral  [] Pharmacy Consult  [] Test Ordered [] Prescription Ordered/Changed   [] Obtained Test Results / Consult Notes        Complete if patient is withholding blood thinner for procedure     Blood Thinner Patient is currently taking:      [] Plavix (Hold for 7 days)  [] Aspirin (Hold for 5 days)     [] Pletal (Hold for 2 days)  [] Pradaxa (Hold for 3 days)    [] Effient (Hold for 7 days)  [] Xarelto (Hold for 2 days)    [] Eliquis (Hold for 2 days)  [] Brilinta (Hold for 7 days)    [] Coumadin (Hold for 5 days) - (INR needs to be drawn the day prior to procedure- INR < 2.0)    [] Aggrenox (Hold for 7 days)        [] Patient will stop medication on their own.    [] Blood Thinner Form Faxed for approval to hold.   Provider form faxed to:     Assessment Completed by:  Electronically signed by Violeta Wood MA on 1/30/2024 at 9:05 AM

## 2024-02-27 ENCOUNTER — OFFICE VISIT (OUTPATIENT)
Dept: CARDIOLOGY CLINIC | Age: 59
End: 2024-02-27
Payer: COMMERCIAL

## 2024-02-27 VITALS
BODY MASS INDEX: 36.45 KG/M2 | HEIGHT: 78 IN | HEART RATE: 70 BPM | DIASTOLIC BLOOD PRESSURE: 82 MMHG | SYSTOLIC BLOOD PRESSURE: 134 MMHG | WEIGHT: 315 LBS | OXYGEN SATURATION: 94 %

## 2024-02-27 DIAGNOSIS — R07.89 ATYPICAL CHEST PAIN: Primary | ICD-10-CM

## 2024-02-27 PROCEDURE — 99214 OFFICE O/P EST MOD 30 MIN: CPT | Performed by: INTERNAL MEDICINE

## 2024-02-27 PROCEDURE — 93000 ELECTROCARDIOGRAM COMPLETE: CPT | Performed by: INTERNAL MEDICINE

## 2024-02-27 RX ORDER — SEMAGLUTIDE 0.68 MG/ML
0.25 INJECTION, SOLUTION SUBCUTANEOUS WEEKLY
Qty: 3 ML | Refills: 1 | Status: SHIPPED | OUTPATIENT
Start: 2024-02-27

## 2024-02-27 ASSESSMENT — ENCOUNTER SYMPTOMS
FACIAL SWELLING: 0
COUGH: 0
SORE THROAT: 0
ABDOMINAL PAIN: 0
ABDOMINAL DISTENTION: 0
SHORTNESS OF BREATH: 1
DIARRHEA: 0
VOMITING: 0
CONSTIPATION: 0
EYE DISCHARGE: 0
WHEEZING: 0
BLOOD IN STOOL: 0
NAUSEA: 0
EYE PAIN: 0
APNEA: 0
EYE REDNESS: 0
CHEST TIGHTNESS: 0

## 2024-02-27 NOTE — PROGRESS NOTES
of weight reduction, cholesterol normalization, improvement in diabetes management, functional capacity and others    Mild coronary artery disease  CT calcium scan minimally abnormal with calcium score 11.8    Non-insulin-dependent diabetes mellitus  Currently on metformin. Semaglutide synergistic management  Managed by PCP            Electronically signed by Nishant Sims MD on 2/27/2024 at 1:51 PM    Nishant Sims MD, B[place], [facility]  Noninvasive Cardiology Consultant    This dictation was generated by voice recognition computer software.  Although all attempts are made to edit the dictation for accuracy, there may be errors in the transcription that are not intended.

## 2024-03-18 DIAGNOSIS — E11.9 TYPE 2 DIABETES MELLITUS WITHOUT COMPLICATION, WITH LONG-TERM CURRENT USE OF INSULIN (HCC): ICD-10-CM

## 2024-03-18 DIAGNOSIS — Z79.4 TYPE 2 DIABETES MELLITUS WITHOUT COMPLICATION, WITH LONG-TERM CURRENT USE OF INSULIN (HCC): ICD-10-CM

## 2024-03-18 DIAGNOSIS — M54.16 LUMBAR RADICULOPATHY: Primary | ICD-10-CM

## 2024-03-18 RX ORDER — HYDROCODONE BITARTRATE AND ACETAMINOPHEN 5; 325 MG/1; MG/1
1 TABLET ORAL 2 TIMES DAILY
Qty: 60 TABLET | Refills: 0 | Status: SHIPPED | OUTPATIENT
Start: 2024-03-18 | End: 2024-04-17

## 2024-03-18 NOTE — TELEPHONE ENCOUNTER
Last OV 1/4/2024  Next OV 4/4/2024      Requested Prescriptions     Pending Prescriptions Disp Refills    metFORMIN (GLUCOPHAGE) 1000 MG tablet 180 tablet 1     Sig: Take 1 tablet by mouth 2 times daily (with meals)

## 2024-03-26 ENCOUNTER — SCHEDULED TELEPHONE ENCOUNTER (OUTPATIENT)
Dept: CARDIOLOGY CLINIC | Age: 59
End: 2024-03-26
Payer: COMMERCIAL

## 2024-03-26 DIAGNOSIS — I25.83 CORONARY ARTERY DISEASE DUE TO LIPID RICH PLAQUE: Primary | ICD-10-CM

## 2024-03-26 DIAGNOSIS — E66.01 CLASS 3 SEVERE OBESITY WITH BODY MASS INDEX (BMI) OF 40.0 TO 44.9 IN ADULT, UNSPECIFIED OBESITY TYPE, UNSPECIFIED WHETHER SERIOUS COMORBIDITY PRESENT (HCC): ICD-10-CM

## 2024-03-26 DIAGNOSIS — I25.10 CORONARY ARTERY DISEASE DUE TO LIPID RICH PLAQUE: Primary | ICD-10-CM

## 2024-03-26 PROCEDURE — 99441 PR PHYS/QHP TELEPHONE EVALUATION 5-10 MIN: CPT | Performed by: NURSE PRACTITIONER

## 2024-03-26 NOTE — PROGRESS NOTES
with an appointment with Dr. Sims on an office to discuss    Disposition: No follow-ups on file.     I affirm this is a Patient Initiated Episode with an Established Patient who has not had a related appointment within my department in the past 7 days or scheduled within the next 24 hours.    Patient identification was verified at the start of the visit: Yes    Total Time: minutes: 5-10 minutes    Note: not billable if this call serves to triage the patient into an appointment for the relevant concern      Harpreet Rankin, APRN

## 2024-03-27 ENCOUNTER — TELEPHONE (OUTPATIENT)
Dept: PAIN MANAGEMENT | Age: 59
End: 2024-03-27

## 2024-03-27 DIAGNOSIS — E11.9 TYPE 2 DIABETES MELLITUS WITHOUT COMPLICATION, WITH LONG-TERM CURRENT USE OF INSULIN (HCC): Primary | ICD-10-CM

## 2024-03-27 DIAGNOSIS — M19.071 PRIMARY OSTEOARTHRITIS OF RIGHT ANKLE: ICD-10-CM

## 2024-03-27 DIAGNOSIS — Z79.4 TYPE 2 DIABETES MELLITUS WITHOUT COMPLICATION, WITH LONG-TERM CURRENT USE OF INSULIN (HCC): Primary | ICD-10-CM

## 2024-03-27 DIAGNOSIS — E78.00 HYPERCHOLESTEREMIA: ICD-10-CM

## 2024-04-06 SDOH — ECONOMIC STABILITY: TRANSPORTATION INSECURITY
IN THE PAST 12 MONTHS, HAS LACK OF TRANSPORTATION KEPT YOU FROM MEETINGS, WORK, OR FROM GETTING THINGS NEEDED FOR DAILY LIVING?: NO

## 2024-04-06 SDOH — ECONOMIC STABILITY: INCOME INSECURITY: HOW HARD IS IT FOR YOU TO PAY FOR THE VERY BASICS LIKE FOOD, HOUSING, MEDICAL CARE, AND HEATING?: NOT HARD AT ALL

## 2024-04-06 SDOH — ECONOMIC STABILITY: FOOD INSECURITY: WITHIN THE PAST 12 MONTHS, THE FOOD YOU BOUGHT JUST DIDN'T LAST AND YOU DIDN'T HAVE MONEY TO GET MORE.: NEVER TRUE

## 2024-04-06 SDOH — ECONOMIC STABILITY: FOOD INSECURITY: WITHIN THE PAST 12 MONTHS, YOU WORRIED THAT YOUR FOOD WOULD RUN OUT BEFORE YOU GOT MONEY TO BUY MORE.: NEVER TRUE

## 2024-04-08 DIAGNOSIS — E11.9 TYPE 2 DIABETES MELLITUS WITHOUT COMPLICATION, WITH LONG-TERM CURRENT USE OF INSULIN (HCC): ICD-10-CM

## 2024-04-08 DIAGNOSIS — Z79.4 TYPE 2 DIABETES MELLITUS WITHOUT COMPLICATION, WITH LONG-TERM CURRENT USE OF INSULIN (HCC): ICD-10-CM

## 2024-04-08 DIAGNOSIS — M19.071 PRIMARY OSTEOARTHRITIS OF RIGHT ANKLE: ICD-10-CM

## 2024-04-08 DIAGNOSIS — E78.00 HYPERCHOLESTEREMIA: ICD-10-CM

## 2024-04-08 LAB
ALBUMIN SERPL-MCNC: 4.4 G/DL (ref 3.5–5.2)
ALP SERPL-CCNC: 61 U/L (ref 40–130)
ALT SERPL-CCNC: 62 U/L (ref 5–41)
ANION GAP SERPL CALCULATED.3IONS-SCNC: 13 MMOL/L (ref 7–19)
AST SERPL-CCNC: 34 U/L (ref 5–40)
BILIRUB SERPL-MCNC: 0.4 MG/DL (ref 0.2–1.2)
BUN SERPL-MCNC: 13 MG/DL (ref 6–20)
CALCIUM SERPL-MCNC: 9.5 MG/DL (ref 8.6–10)
CHLORIDE SERPL-SCNC: 102 MMOL/L (ref 98–111)
CHOLEST SERPL-MCNC: 218 MG/DL (ref 160–199)
CO2 SERPL-SCNC: 24 MMOL/L (ref 22–29)
CREAT SERPL-MCNC: 1 MG/DL (ref 0.5–1.2)
GLUCOSE SERPL-MCNC: 242 MG/DL (ref 74–109)
HBA1C MFR BLD: 9.7 % (ref 4–6)
HDLC SERPL-MCNC: 36 MG/DL (ref 55–121)
LDLC SERPL CALC-MCNC: 143 MG/DL
POTASSIUM SERPL-SCNC: 4.6 MMOL/L (ref 3.5–5)
PROT SERPL-MCNC: 6.6 G/DL (ref 6.6–8.7)
SODIUM SERPL-SCNC: 139 MMOL/L (ref 136–145)
TRIGL SERPL-MCNC: 195 MG/DL (ref 0–149)

## 2024-04-09 ENCOUNTER — OFFICE VISIT (OUTPATIENT)
Dept: INTERNAL MEDICINE | Age: 59
End: 2024-04-09
Payer: COMMERCIAL

## 2024-04-09 VITALS
BODY MASS INDEX: 41.23 KG/M2 | DIASTOLIC BLOOD PRESSURE: 88 MMHG | OXYGEN SATURATION: 97 % | SYSTOLIC BLOOD PRESSURE: 132 MMHG | HEART RATE: 66 BPM | WEIGHT: 315 LBS

## 2024-04-09 DIAGNOSIS — E78.00 HYPERCHOLESTEREMIA: ICD-10-CM

## 2024-04-09 DIAGNOSIS — E66.01 MORBID OBESITY DUE TO EXCESS CALORIES (HCC): ICD-10-CM

## 2024-04-09 DIAGNOSIS — E11.00 TYPE 2 DIABETES MELLITUS WITH HYPEROSMOLARITY WITHOUT COMA, WITHOUT LONG-TERM CURRENT USE OF INSULIN (HCC): Primary | ICD-10-CM

## 2024-04-09 PROCEDURE — 99214 OFFICE O/P EST MOD 30 MIN: CPT | Performed by: INTERNAL MEDICINE

## 2024-04-09 PROCEDURE — 3046F HEMOGLOBIN A1C LEVEL >9.0%: CPT | Performed by: INTERNAL MEDICINE

## 2024-04-09 RX ORDER — GLYBURIDE 5 MG/1
10 TABLET ORAL 2 TIMES DAILY WITH MEALS
Qty: 360 TABLET | Refills: 1 | Status: SHIPPED | OUTPATIENT
Start: 2024-04-09 | End: 2024-04-09 | Stop reason: CLARIF

## 2024-04-09 ASSESSMENT — ENCOUNTER SYMPTOMS
EYE DISCHARGE: 0
BLOOD IN STOOL: 0
WHEEZING: 0
BACK PAIN: 0
SHORTNESS OF BREATH: 0
ABDOMINAL PAIN: 0
EYE ITCHING: 0
SORE THROAT: 0
SINUS PRESSURE: 0
VOMITING: 0
NAUSEA: 0
TROUBLE SWALLOWING: 0
ABDOMINAL DISTENTION: 0

## 2024-04-09 NOTE — PROGRESS NOTES
[facility]  [facility]  [address] [place] [address] [address]  [address]  [place] [address]  Dept: [phone]  Dept Fax: [phone]  Loc: [phone]      Visit Date: 2024    Kenneth Holden a 58 y.o. male who presents today for:  Chief Complaint   Patient presents with    Follow-up         HPI:      follow-up on his diabetes.  His numbers are much worse.  A1c is up to 9.7.  He is on metformin at thousand twice daily.  He takes him glucamide and took 1 dose and had an anaphylactic reaction with his throat closing up and he had trouble breathing so he is    Past Medical History:   Diagnosis Date    Arthritis     Back pain     Chronic back pain 2019    Diabetes mellitus (HCC)     Erectile dysfunction     Obesity     Seasonal allergies     Type 2 diabetes mellitus without complication (HCC) 2018      Past Surgical History:   Procedure Laterality Date    COLONOSCOPY N/A 04/15/2022    Dr HANS Yee-Apparent submucosal lesion noted ? Lipoma, r/o rule out adenomatous mucosal tissue.in the right colon-AP x 1, HP x 3, BCM x 1, 3 yr recall    CYST REMOVAL      right wrist    ENDOSCOPY, COLON, DIAGNOSTIC      TYMPANOSTOMY TUBE PLACEMENT      UPPER GASTROINTESTINAL ENDOSCOPY         Family History   Problem Relation Age of Onset    Atrial Fibrillation Mother     Heart Disease Mother     High Blood Pressure Mother     Breast Cancer Maternal Aunt     Heart Attack Paternal Uncle        Social History     Tobacco Use    Smoking status: Former     Current packs/day: 0.00     Average packs/day: 1.5 packs/day for 18.7 years (28.0 ttl pk-yrs)     Types: Cigarettes     Start date: 1993     Quit date: 2012     Years since quittin.1    Smokeless tobacco: Never   Substance Use Topics    Alcohol use: Not Currently      Current Outpatient Medications   Medication Sig Dispense Refill    metFORMIN (GLUCOPHAGE) 1000 MG tablet Take 1 tablet by mouth 2 times daily (with meals) 180 tablet 1

## 2024-04-15 ENCOUNTER — HOSPITAL ENCOUNTER (OUTPATIENT)
Dept: PAIN MANAGEMENT | Age: 59
Discharge: HOME | End: 2024-04-15
Payer: COMMERCIAL

## 2024-04-15 VITALS
OXYGEN SATURATION: 95 % | RESPIRATION RATE: 18 BRPM | BODY MASS INDEX: 36.45 KG/M2 | HEIGHT: 78 IN | TEMPERATURE: 96.9 F | HEART RATE: 67 BPM | WEIGHT: 315 LBS | SYSTOLIC BLOOD PRESSURE: 137 MMHG | DIASTOLIC BLOOD PRESSURE: 76 MMHG

## 2024-04-15 DIAGNOSIS — M47.816 FACET ARTHROPATHY, LUMBAR: ICD-10-CM

## 2024-04-15 DIAGNOSIS — M19.079 OSTEOARTHRITIS OF ANKLE, UNSPECIFIED LATERALITY, UNSPECIFIED OSTEOARTHRITIS TYPE: ICD-10-CM

## 2024-04-15 DIAGNOSIS — Z79.891 ENCOUNTER FOR MONITORING OPIOID MAINTENANCE THERAPY: ICD-10-CM

## 2024-04-15 DIAGNOSIS — M54.16 LUMBAR RADICULOPATHY: Primary | ICD-10-CM

## 2024-04-15 DIAGNOSIS — Z51.81 ENCOUNTER FOR MONITORING OPIOID MAINTENANCE THERAPY: ICD-10-CM

## 2024-04-15 DIAGNOSIS — M54.16 LUMBAR RADICULOPATHY: ICD-10-CM

## 2024-04-15 DIAGNOSIS — M79.18 MYOFASCIAL MUSCLE PAIN: ICD-10-CM

## 2024-04-15 PROCEDURE — 99214 OFFICE O/P EST MOD 30 MIN: CPT

## 2024-04-15 RX ORDER — HYDROCODONE BITARTRATE AND ACETAMINOPHEN 5; 325 MG/1; MG/1
1 TABLET ORAL 2 TIMES DAILY
Qty: 60 TABLET | Refills: 0 | Status: SHIPPED | OUTPATIENT
Start: 2024-04-25 | End: 2024-05-25

## 2024-04-15 RX ORDER — DICLOFENAC SODIUM 75 MG/1
75 TABLET, DELAYED RELEASE ORAL 2 TIMES DAILY
Qty: 60 TABLET | Refills: 2 | Status: SHIPPED | OUTPATIENT
Start: 2024-04-15 | End: 2024-07-14

## 2024-04-15 RX ORDER — AMITRIPTYLINE HYDROCHLORIDE 25 MG/1
25 TABLET, FILM COATED ORAL NIGHTLY
Qty: 30 TABLET | Refills: 2 | Status: SHIPPED | OUTPATIENT
Start: 2024-04-15 | End: 2024-07-14

## 2024-04-15 ASSESSMENT — ENCOUNTER SYMPTOMS
RESPIRATORY NEGATIVE: 1
EYES NEGATIVE: 1
GASTROINTESTINAL NEGATIVE: 1
BOWEL INCONTINENCE: 0
BACK PAIN: 1

## 2024-04-15 ASSESSMENT — PAIN SCALES - GENERAL: PAINLEVEL_OUTOF10: 5

## 2024-04-15 ASSESSMENT — PAIN DESCRIPTION - PAIN TYPE: TYPE: CHRONIC PAIN

## 2024-04-15 ASSESSMENT — PAIN DESCRIPTION - LOCATION: LOCATION: BACK;ANKLE

## 2024-04-15 NOTE — TELEPHONE ENCOUNTER
1. Lumbar radiculopathy    2. Osteoarthritis of ankle, unspecified laterality, unspecified osteoarthritis type      Requested Prescriptions     Pending Prescriptions Disp Refills    HYDROcodone-acetaminophen (NORCO) 5-325 MG per tablet 60 tablet 0     Sig: Take 1 tablet by mouth 2 times daily for 30 days. May fill 4/25/2024 Max Daily Amount: 2 tablets    diclofenac (VOLTAREN) 75 MG EC tablet 60 tablet 2     Sig: Take 1 tablet by mouth 2 times daily       Continue medication with refill sent at appointment yes; refill sent to medical director at appointment no, see refill encounter dated 4/15/2024    Electronically signed by ANNAMARIE Tyler CNP on 4/15/2024 at 3:48 PM

## 2024-04-15 NOTE — PROGRESS NOTES
Clinic Documentation      Education Provided:  [x] Review of Eriberto  [] Agreement Review  [x] PEG Score Calculated [] PHQ Score Calculated [] ORT Score Calculated    [] Compliance Issues Discussed [] Cognitive Behavior Needs [x] Exercise [] Review of Test [] Financial Issues  [x] Tobacco/Alcohol Use Reviewed [x] Teaching [] New Patient [] Picture Obtained    Physician Plan:  [] Outgoing Referral  [] Pharmacy Consult  [x] Test Ordered [x] Prescription Ordered/Changed   [] Obtained Test Results / Consult Notes        Complete if patient is withholding blood thinner for procedure     Blood Thinner Patient is currently taking:      [] Plavix (Hold for 7 days)  [] Aspirin (Hold for 5 days)     [] Pletal (Hold for 2 days)  [] Pradaxa (Hold for 3 days)    [] Effient (Hold for 7 days)  [] Xarelto (Hold for 2 days)    [] Eliquis (Hold for 2 days)  [] Brilinta (Hold for 7 days)    [] Coumadin (Hold for 5 days) - (INR needs to be drawn the day prior to procedure- INR < 2.0)    [] Aggrenox (Hold for 7 days)        [] Patient will stop medication on their own.    [] Blood Thinner Form Faxed for approval to hold.   Provider form faxed to:     Assessment Completed by:  Electronically signed by Violeta Wood MA on 4/15/2024 at 3:31 PM  
Strongly reinforced that exercise is exteremly important part of pain management. Exercises should be completed upon awaking and before bed. Patient agreed with POC and questions were asked and answered. See DC instructions.     Activity: discussed exercise as beneficial to pain reduction, encouraged stretching exercise at least twice daily with a focus on torso (core) strengthening.    Goal:  Pain Management Goals of Therapy:   [] Resolution in pain  [x] Decrease in pain level  [x] Improvement in ADL's  [x] Increase in activities with less pain  [] Decrease in medication     Controlled substance monitoring:    [x] Discussed medication side effects, risk of tolerance and/or dependence, and/or alternative treatment  [] Discussed the detrimental effects of long term narcotic use in younger patients especially women of childbearing years.  [x] No signs and symptoms of potential drug abuse or diversion were identified  [] Signs of potential drug abuse or diversion were identified   [] ORT Score   [] UDS non-compliant   [] See Note  [x] Random urine drug screen sent today  [] Random urine drug screen not completed today   [] Deferred New Patient  [] Compliant   [] Not Compliant see note  [] Medication agreement with provider signed today  [x] Medication agreement with provider on file under media   [] Medication regimen effective with no c/o side effects and continued   [] New patient continuing current medication while developing POC   [x] On going assessment and evaluation of medication regimen  [] Medication regimen not effective see plan for changes  [x] Eriberto reviewed & on file under media     CC:  Janusz Ruiz MD Ashley D Sharp, APRN - CNP, 4/15/2024 at 3:57 PM    EMR dragon/transcription disclaimer: Much of this encounter note is electronic transcription/translation of spoken language to printed tach. Electronic translation of spoken language may be erroneous, or at times, nonsensical words or phrases

## 2024-04-16 ENCOUNTER — TELEPHONE (OUTPATIENT)
Dept: INTERNAL MEDICINE | Age: 59
End: 2024-04-16

## 2024-04-16 NOTE — TELEPHONE ENCOUNTER
Patient stated that they seen Dr. Ruiz on 4/9/2024 and was supposed to be put on glipizide 10MG but was never sent to the pharmacy

## 2024-04-16 NOTE — TELEPHONE ENCOUNTER
Pt not receptive to insulin tx. He would like to wait until next appt on 7/9 to discuss further. Advised pt to cut back significantly on sugar and carb intake between now and then, and to check BS frequently. Pt OLGA.

## 2024-04-16 NOTE — TELEPHONE ENCOUNTER
Glipizide was not ordered due to sulfa allergy, pt was to see Kay Watson for diabetic education and see if she had any recommendations. I spoke with pt today, he states she has called him and he declined DM ed since he's been through it previously and didn't want to pay the cost a second time. So pt can't take glipizide and isn't seeing DM ed, please advise plan moving forward.

## 2024-05-14 ENCOUNTER — OFFICE VISIT (OUTPATIENT)
Dept: CARDIOLOGY CLINIC | Age: 59
End: 2024-05-14
Payer: COMMERCIAL

## 2024-05-14 VITALS
BODY MASS INDEX: 36.45 KG/M2 | WEIGHT: 315 LBS | HEART RATE: 60 BPM | DIASTOLIC BLOOD PRESSURE: 86 MMHG | HEIGHT: 78 IN | SYSTOLIC BLOOD PRESSURE: 140 MMHG

## 2024-05-14 DIAGNOSIS — E66.01 CLASS 3 SEVERE OBESITY WITH BODY MASS INDEX (BMI) OF 40.0 TO 44.9 IN ADULT, UNSPECIFIED OBESITY TYPE, UNSPECIFIED WHETHER SERIOUS COMORBIDITY PRESENT (HCC): Primary | ICD-10-CM

## 2024-05-14 PROCEDURE — 99214 OFFICE O/P EST MOD 30 MIN: CPT | Performed by: INTERNAL MEDICINE

## 2024-05-14 RX ORDER — PHENTERMINE HYDROCHLORIDE 15 MG/1
15 CAPSULE ORAL EVERY MORNING
Qty: 30 CAPSULE | Refills: 0 | Status: SHIPPED | OUTPATIENT
Start: 2024-05-14 | End: 2024-06-13

## 2024-05-14 ASSESSMENT — ENCOUNTER SYMPTOMS
COUGH: 0
VOMITING: 0
EYE PAIN: 0
ABDOMINAL DISTENTION: 0
ABDOMINAL PAIN: 0
EYE REDNESS: 0
DIARRHEA: 0
SHORTNESS OF BREATH: 0
BLOOD IN STOOL: 0
CONSTIPATION: 0
WHEEZING: 0
SORE THROAT: 0
EYE DISCHARGE: 0
CHEST TIGHTNESS: 0
APNEA: 0
NAUSEA: 0

## 2024-05-14 NOTE — PROGRESS NOTES
Cardiology Office Visit Note  [address] [facility], [facility]  [address]  Phone: [phone]  Fax: [phone]                            Date:  5/14/2024  Patient: Kenneth Rowell  Age:  58 y.o., 1965    Referral: No ref. provider found    REASON FOR VISIT:  Follow-up         PROBLEM LIST:    Patient Active Problem List    Diagnosis Date Noted    Atypical chest pain 06/16/2022     Priority: Medium    Encounter for monitoring opioid maintenance therapy 04/15/2024    Myofascial muscle pain 01/30/2024    Facet arthropathy, lumbar 01/30/2024    Pain management contract agreement 01/30/2024    Lumbar radiculopathy 02/15/2022    Acute non-recurrent pansinusitis 08/19/2019    Morbid obesity due to excess calories (HCC) 02/22/2018    Hypercholesteremia 02/22/2018    Osteoarthritis of ankle 02/22/2018    Type 2 diabetes mellitus (HCC) 02/22/2018         PRESENTATION: Kenneth Rowell is a 58 y.o. year old male is seen today for routine cardiology follow-up appointment.  His past medical history significant for chronic coronary artery disease as evidenced by an abnormal calcium score, morbid obesity and type 2 diabetes mellitus.  Also has a history of COVID-19 infection (2022) and ex-cigarette smoking.  He is compliant with his medications.  He is currently on metformin and unfortunately did not tolerate semaglutide.  No new or progressively worsening symptoms reported at this time.    REVIEW OF SYSTEMS:  Review of Systems   Constitutional:  Negative for chills, fatigue and fever.   HENT:  Negative for congestion, facial swelling, hearing loss and sore throat.    Eyes:  Negative for pain, discharge, redness and visual disturbance.   Respiratory:  Negative for apnea, cough, chest tightness, shortness of breath and wheezing.    Cardiovascular:  Negative for chest pain, palpitations and leg swelling.   Gastrointestinal:  Negative for abdominal distention, abdominal pain, blood in stool, constipation,

## 2024-05-15 ENCOUNTER — TELEPHONE (OUTPATIENT)
Dept: CARDIOLOGY CLINIC | Age: 59
End: 2024-05-15

## 2024-05-15 NOTE — TELEPHONE ENCOUNTER
This is a scheduled drug and it has to be paper script or you have to have one of those ticket item things on your phone to be able to send scheduled meds now. What would you like to do with this one?

## 2024-05-16 NOTE — TELEPHONE ENCOUNTER
Pt notified that Dr. Sims can not send script due to not having the RAMAKRISHNA button to send. Dr. Sims will send office note to PCP and pt needs to speak with PCP and get script from him. Pt verbally understood. I sent notes to PCP.

## 2024-05-20 ENCOUNTER — TELEPHONE (OUTPATIENT)
Dept: INTERNAL MEDICINE | Age: 59
End: 2024-05-20

## 2024-05-20 NOTE — TELEPHONE ENCOUNTER
Patient had questions about diclofenac (VOLTAREN) 75 MG EC tablet medication. Patient stated that Dr. Ruiz was the original prescriber and now it has  Lisa Dalal as prescriber. Patient stated that with Dr. Ruiz as prescriber they could request refill on mychart but with Katlin as prescriber they have to call and it takes about a week to get prescription filled. Patient was wanting to see if Dr. Ruiz could be the prescriber of medication

## 2024-05-20 NOTE — TELEPHONE ENCOUNTER
Pain mgmt took this medication over. Att to call pt, LVM informing that it is best for them to handle all pain medications. Urged to CB with further questions.

## 2024-05-29 ENCOUNTER — HOSPITAL ENCOUNTER (OUTPATIENT)
Dept: PAIN MANAGEMENT | Age: 59
Discharge: HOME | End: 2024-05-29
Payer: COMMERCIAL

## 2024-05-29 VITALS
TEMPERATURE: 96.6 F | BODY MASS INDEX: 36.45 KG/M2 | HEART RATE: 52 BPM | WEIGHT: 315 LBS | HEIGHT: 78 IN | RESPIRATION RATE: 16 BRPM | DIASTOLIC BLOOD PRESSURE: 82 MMHG | SYSTOLIC BLOOD PRESSURE: 139 MMHG | OXYGEN SATURATION: 95 %

## 2024-05-29 DIAGNOSIS — M54.16 LUMBAR RADICULOPATHY: Primary | ICD-10-CM

## 2024-05-29 DIAGNOSIS — M54.42 CHRONIC BILATERAL LOW BACK PAIN WITH BILATERAL SCIATICA: ICD-10-CM

## 2024-05-29 DIAGNOSIS — M54.41 CHRONIC BILATERAL LOW BACK PAIN WITH BILATERAL SCIATICA: ICD-10-CM

## 2024-05-29 DIAGNOSIS — G89.29 CHRONIC BILATERAL LOW BACK PAIN WITH BILATERAL SCIATICA: ICD-10-CM

## 2024-05-29 PROCEDURE — 99213 OFFICE O/P EST LOW 20 MIN: CPT

## 2024-05-29 PROCEDURE — 99214 OFFICE O/P EST MOD 30 MIN: CPT

## 2024-05-29 RX ORDER — HYDROCODONE BITARTRATE AND ACETAMINOPHEN 5; 325 MG/1; MG/1
1 TABLET ORAL 2 TIMES DAILY
Qty: 60 TABLET | Refills: 0 | Status: SHIPPED | OUTPATIENT
Start: 2024-05-30 | End: 2024-06-29

## 2024-05-29 RX ORDER — AMITRIPTYLINE HYDROCHLORIDE 50 MG/1
50 TABLET, FILM COATED ORAL NIGHTLY
Qty: 30 TABLET | Refills: 3 | Status: SHIPPED | OUTPATIENT
Start: 2024-05-29

## 2024-05-29 ASSESSMENT — PAIN DESCRIPTION - LOCATION
LOCATION: BACK
LOCATION_2: ANKLE

## 2024-05-29 ASSESSMENT — ENCOUNTER SYMPTOMS
EYES NEGATIVE: 1
BACK PAIN: 1
RESPIRATORY NEGATIVE: 1
BOWEL INCONTINENCE: 0
GASTROINTESTINAL NEGATIVE: 1

## 2024-05-29 ASSESSMENT — PAIN DESCRIPTION - PAIN TYPE: TYPE: CHRONIC PAIN

## 2024-05-29 ASSESSMENT — PAIN SCALES - GENERAL: PAINLEVEL_OUTOF10: 5

## 2024-05-29 ASSESSMENT — PAIN DESCRIPTION - ORIENTATION
ORIENTATION_2: RIGHT
ORIENTATION: LOWER

## 2024-05-29 ASSESSMENT — PAIN DESCRIPTION - INTENSITY: RATING_2: 6

## 2024-05-29 NOTE — PROGRESS NOTES
Clinic Documentation      Education Provided:  [x] Review of Eriberto  [] Agreement Review  [x] PEG Score Calculated [] PHQ Score Calculated [] ORT Score Calculated    [] Compliance Issues Discussed [] Cognitive Behavior Needs [x] Exercise [] Review of Test [] Financial Issues  [x] Tobacco/Alcohol Use Reviewed [x] Teaching [] New Patient [] Picture Obtained    Physician Plan:  [] Outgoing Referral  [] Pharmacy Consult  [] Test Ordered [] Prescription Ordered/Changed   [] Obtained Test Results / Consult Notes        Complete if patient is withholding blood thinner for procedure     Blood Thinner Patient is currently taking:      [] Plavix (Hold for 7 days)  [] Aspirin (Hold for 5 days)     [] Pletal (Hold for 2 days)  [] Pradaxa (Hold for 3 days)    [] Effient (Hold for 7 days)  [] Xarelto (Hold for 2 days)    [] Eliquis (Hold for 2 days)  [] Brilinta (Hold for 7 days)    [] Coumadin (Hold for 5 days) - (INR needs to be drawn the day prior to procedure- INR < 2.0)    [] Aggrenox (Hold for 7 days)        [] Patient will stop medication on their own.    [] Blood Thinner Form Faxed for approval to hold.   Provider form faxed to:     Assessment Completed by:  Electronically signed by Tanisha Brody MA on 5/29/2024 at 10:45 AM  
[START ON 5/30/2024] HYDROcodone-acetaminophen (NORCO) 5-325 MG per tablet Take 1 tablet by mouth 2 times daily for 30 days. MAY FILL 5/30/2024 Max Daily Amount: 2 tablets 60 tablet 0   • phentermine 15 MG capsule Take 1 capsule by mouth every morning for 30 days. Max Daily Amount: 15 mg 30 capsule 0   • diclofenac (VOLTAREN) 75 MG EC tablet Take 1 tablet by mouth 2 times daily 60 tablet 2   • metFORMIN (GLUCOPHAGE) 1000 MG tablet Take 1 tablet by mouth 2 times daily (with meals) 180 tablet 1   • tadalafil (CIALIS) 10 MG tablet Take 1 tablet by mouth daily as needed for Erectile Dysfunction 10 tablet 3   • diclofenac sodium (VOLTAREN) 1 % GEL Apply 2 g topically 2 times daily 1 g 5   • blood glucose monitor strips Test 4 times a day DX E11.9 ONE TOUCH VERIO FLEX 200 strip 5   • Lancets MISC 1 each by Does not apply route 4 times daily 100 each 3     No current facility-administered medications for this encounter.       Allergies  Penicillins, Gabapentin, Iodine, Ketorolac tromethamine, Lyrica [pregabalin], Ozempic (0.25 or 0.5 mg-dose) [semaglutide(0.25 or 0.5mg-dos)], Rocephin [ceftriaxone], and Sulfa antibiotics    Current Medications  Current Outpatient Medications   Medication Sig Dispense Refill   • amitriptyline (ELAVIL) 50 MG tablet Take 1 tablet by mouth nightly 30 tablet 3   • [START ON 5/30/2024] HYDROcodone-acetaminophen (NORCO) 5-325 MG per tablet Take 1 tablet by mouth 2 times daily for 30 days. MAY FILL 5/30/2024 Max Daily Amount: 2 tablets 60 tablet 0   • phentermine 15 MG capsule Take 1 capsule by mouth every morning for 30 days. Max Daily Amount: 15 mg 30 capsule 0   • diclofenac (VOLTAREN) 75 MG EC tablet Take 1 tablet by mouth 2 times daily 60 tablet 2   • metFORMIN (GLUCOPHAGE) 1000 MG tablet Take 1 tablet by mouth 2 times daily (with meals) 180 tablet 1   • tadalafil (CIALIS) 10 MG tablet Take 1 tablet by mouth daily as needed for Erectile Dysfunction 10 tablet 3   • diclofenac sodium (VOLTAREN)

## 2024-06-04 ENCOUNTER — TELEPHONE (OUTPATIENT)
Dept: INTERNAL MEDICINE | Age: 59
End: 2024-06-04

## 2024-06-05 RX ORDER — CYCLOBENZAPRINE HCL 10 MG
10 TABLET ORAL 2 TIMES DAILY
Qty: 6 TABLET | Refills: 0 | Status: CANCELLED | OUTPATIENT
Start: 2024-06-05 | End: 2024-06-08

## 2024-06-05 NOTE — TELEPHONE ENCOUNTER
Last OV 4/9/2024  Next OV 7/9/2024      Requested Prescriptions     Pending Prescriptions Disp Refills    cyclobenzaprine (FLEXERIL) 10 MG tablet 6 tablet 0     Sig: Take 1 tablet by mouth in the morning and at bedtime for 3 days

## 2024-07-03 ENCOUNTER — HOSPITAL ENCOUNTER (OUTPATIENT)
Dept: PAIN MANAGEMENT | Age: 59
Discharge: HOME | End: 2024-07-03
Payer: COMMERCIAL

## 2024-07-03 VITALS
TEMPERATURE: 96.8 F | DIASTOLIC BLOOD PRESSURE: 80 MMHG | HEART RATE: 76 BPM | WEIGHT: 315 LBS | OXYGEN SATURATION: 94 % | BODY MASS INDEX: 36.45 KG/M2 | HEIGHT: 78 IN | SYSTOLIC BLOOD PRESSURE: 134 MMHG | RESPIRATION RATE: 16 BRPM

## 2024-07-03 DIAGNOSIS — G89.29 CHRONIC BILATERAL LOW BACK PAIN WITH BILATERAL SCIATICA: Primary | ICD-10-CM

## 2024-07-03 DIAGNOSIS — M54.16 LUMBAR RADICULOPATHY: ICD-10-CM

## 2024-07-03 DIAGNOSIS — M54.42 CHRONIC BILATERAL LOW BACK PAIN WITH BILATERAL SCIATICA: Primary | ICD-10-CM

## 2024-07-03 DIAGNOSIS — E11.00 TYPE 2 DIABETES MELLITUS WITH HYPEROSMOLARITY WITHOUT COMA, WITHOUT LONG-TERM CURRENT USE OF INSULIN (HCC): ICD-10-CM

## 2024-07-03 DIAGNOSIS — E66.01 MORBID OBESITY DUE TO EXCESS CALORIES (HCC): ICD-10-CM

## 2024-07-03 DIAGNOSIS — M19.079 OSTEOARTHRITIS OF ANKLE, UNSPECIFIED LATERALITY, UNSPECIFIED OSTEOARTHRITIS TYPE: ICD-10-CM

## 2024-07-03 DIAGNOSIS — M54.41 CHRONIC BILATERAL LOW BACK PAIN WITH BILATERAL SCIATICA: Primary | ICD-10-CM

## 2024-07-03 DIAGNOSIS — E78.00 HYPERCHOLESTEREMIA: ICD-10-CM

## 2024-07-03 LAB
ALBUMIN SERPL-MCNC: 4.4 G/DL (ref 3.5–5.2)
ALP SERPL-CCNC: 58 U/L (ref 40–130)
ALT SERPL-CCNC: 51 U/L (ref 5–41)
ANION GAP SERPL CALCULATED.3IONS-SCNC: 14 MMOL/L (ref 7–19)
AST SERPL-CCNC: 32 U/L (ref 5–40)
BILIRUB SERPL-MCNC: 0.4 MG/DL (ref 0.2–1.2)
BUN SERPL-MCNC: 14 MG/DL (ref 6–20)
CALCIUM SERPL-MCNC: 9.6 MG/DL (ref 8.6–10)
CHLORIDE SERPL-SCNC: 102 MMOL/L (ref 98–111)
CO2 SERPL-SCNC: 25 MMOL/L (ref 22–29)
CREAT SERPL-MCNC: 1.1 MG/DL (ref 0.5–1.2)
CREAT UR-MCNC: 302.1 MG/DL (ref 39–259)
GLUCOSE SERPL-MCNC: 152 MG/DL (ref 74–109)
HBA1C MFR BLD: 8 % (ref 4–6)
MICROALBUMIN UR-MCNC: <1.2 MG/DL (ref 0–19)
MICROALBUMIN/CREAT UR-RTO: ABNORMAL MG/G
POTASSIUM SERPL-SCNC: 4.5 MMOL/L (ref 3.5–5)
PROT SERPL-MCNC: 7 G/DL (ref 6.6–8.7)
SODIUM SERPL-SCNC: 141 MMOL/L (ref 136–145)

## 2024-07-03 PROCEDURE — 99214 OFFICE O/P EST MOD 30 MIN: CPT

## 2024-07-03 PROCEDURE — 99213 OFFICE O/P EST LOW 20 MIN: CPT

## 2024-07-03 RX ORDER — DICLOFENAC SODIUM 75 MG/1
75 TABLET, DELAYED RELEASE ORAL 2 TIMES DAILY
Qty: 60 TABLET | Refills: 2 | Status: SHIPPED | OUTPATIENT
Start: 2024-07-03 | End: 2024-10-01

## 2024-07-03 RX ORDER — AMITRIPTYLINE HYDROCHLORIDE 50 MG/1
50 TABLET, FILM COATED ORAL NIGHTLY
Qty: 30 TABLET | Refills: 2 | Status: SHIPPED | OUTPATIENT
Start: 2024-07-03

## 2024-07-03 ASSESSMENT — ENCOUNTER SYMPTOMS
BACK PAIN: 1
BOWEL INCONTINENCE: 0
GASTROINTESTINAL NEGATIVE: 1
EYES NEGATIVE: 1
RESPIRATORY NEGATIVE: 1

## 2024-07-03 ASSESSMENT — PAIN DESCRIPTION - LOCATION: LOCATION: BACK

## 2024-07-03 ASSESSMENT — PAIN DESCRIPTION - PAIN TYPE: TYPE: CHRONIC PAIN

## 2024-07-03 ASSESSMENT — PAIN SCALES - GENERAL: PAINLEVEL_OUTOF10: 6

## 2024-07-03 ASSESSMENT — PAIN DESCRIPTION - ORIENTATION: ORIENTATION: LOWER

## 2024-07-03 NOTE — TELEPHONE ENCOUNTER
1. Osteoarthritis of ankle, unspecified laterality, unspecified osteoarthritis type    2. Lumbar radiculopathy      Requested Prescriptions     Pending Prescriptions Disp Refills    diclofenac (VOLTAREN) 75 MG EC tablet 60 tablet 2     Sig: Take 1 tablet by mouth 2 times daily    amitriptyline (ELAVIL) 50 MG tablet 30 tablet 2     Sig: Take 1 tablet by mouth nightly       Continue medication with refill sent at appointment yes; refill sent to medical director at appointment no, see refill encounter dated 7/3/2024    Electronically signed by ANNAMARIE Tyler CNP on 7/3/2024 at 3:29 PM

## 2024-07-03 NOTE — PROGRESS NOTES
Office Note    Patient Name: Kenneth Rowell    MR #: [id number]    Account #:[id number]    : 1965    Age: 59 y.o.    Sex: male    Date: 7/3/2024    PCP: Janusz Ruiz MD    Referring Provider: Dr. Ruiz     Chief Complaint:   Chief Complaint   Patient presents with   • Back Pain     6/10       History of Present Illness:   The patient is a 59 y.o. male who presents to the office for a 1 month follow up imaging.     MRI Lumbar Spine completed 2024 at [facility] showed Chronic mild L1 compression fracture. Advanced facet arthropathy of L4-5 and L5-S1 with associated neural foraminal narrowing. L5-S1 level posterior disc annular tear.     Patient has progressive chronic low back pain with bilateral radiculopathy worse on the right than left. Patient rates his pain level a 6/10 today. Patient continues to participate in the home exercise program and has for greater than 6 weeks at least 3 times weekly or as tolerated.     Back Pain  This is a chronic problem. The current episode started more than 1 year ago. The problem occurs constantly. The problem is unchanged. The pain is present in the lumbar spine and sacro-iliac. The quality of the pain is described as aching and cramping. The pain radiates to the right thigh, right foot and right knee. The pain is at a severity of 6/10. The pain is moderate. The pain is The same all the time. The symptoms are aggravated by bending, position, standing, twisting and sitting. Stiffness is present In the morning. Associated symptoms include leg pain and numbness. Pertinent negatives include no bladder incontinence, bowel incontinence or weakness. Risk factors include obesity. He has tried NSAIDs, heat, ice and home exercises for the symptoms. The treatment provided mild relief.       Patient is on or has tried and failed following medications:   Narcotics: hydrocodone/acetaminophen (Lorcet, Lortab, Norco, Vicodin);   Anticonvulsants: gabapentin (Neurontin) and

## 2024-07-03 NOTE — PROGRESS NOTES
Clinic Documentation      Education Provided:  [x] Review of Eriberto  [] Agreement Review  [x] PEG Score Calculated [] PHQ Score Calculated [] ORT Score Calculated    [] Compliance Issues Discussed [] Cognitive Behavior Needs [x] Exercise [] Review of Test [] Financial Issues  [x] Tobacco/Alcohol Use Reviewed [x] Teaching [] New Patient [] Picture Obtained    Physician Plan:  [] Outgoing Referral  [] Pharmacy Consult  [] Test Ordered [] Prescription Ordered/Changed   [] Obtained Test Results / Consult Notes        Complete if patient is withholding blood thinner for procedure     Blood Thinner Patient is currently taking:      [] Plavix (Hold for 7 days)  [] Aspirin (Hold for 5 days)     [] Pletal (Hold for 2 days)  [] Pradaxa (Hold for 3 days)    [] Effient (Hold for 7 days)  [] Xarelto (Hold for 2 days)    [] Eliquis (Hold for 2 days)  [] Brilinta (Hold for 7 days)    [] Coumadin (Hold for 5 days) - (INR needs to be drawn the day prior to procedure- INR < 2.0)    [] Aggrenox (Hold for 7 days)        [] Patient will stop medication on their own.    [] Blood Thinner Form Faxed for approval to hold.   Provider form faxed to:     Assessment Completed by:  Electronically signed by Valeria Slade RN on 7/3/2024 at 3:37 PM

## 2024-07-09 ENCOUNTER — OFFICE VISIT (OUTPATIENT)
Dept: INTERNAL MEDICINE | Age: 59
End: 2024-07-09
Payer: COMMERCIAL

## 2024-07-09 VITALS
OXYGEN SATURATION: 96 % | BODY MASS INDEX: 36.45 KG/M2 | DIASTOLIC BLOOD PRESSURE: 88 MMHG | HEART RATE: 63 BPM | SYSTOLIC BLOOD PRESSURE: 130 MMHG | HEIGHT: 78 IN | WEIGHT: 315 LBS

## 2024-07-09 DIAGNOSIS — E11.00 TYPE 2 DIABETES MELLITUS WITH HYPEROSMOLARITY WITHOUT COMA, WITHOUT LONG-TERM CURRENT USE OF INSULIN (HCC): Primary | ICD-10-CM

## 2024-07-09 DIAGNOSIS — M54.42 CHRONIC BILATERAL LOW BACK PAIN WITH BILATERAL SCIATICA: ICD-10-CM

## 2024-07-09 DIAGNOSIS — E66.01 MORBID OBESITY DUE TO EXCESS CALORIES (HCC): ICD-10-CM

## 2024-07-09 DIAGNOSIS — M54.41 CHRONIC BILATERAL LOW BACK PAIN WITH BILATERAL SCIATICA: ICD-10-CM

## 2024-07-09 DIAGNOSIS — G89.29 CHRONIC BILATERAL LOW BACK PAIN WITH BILATERAL SCIATICA: ICD-10-CM

## 2024-07-09 DIAGNOSIS — M54.16 LUMBAR RADICULOPATHY: ICD-10-CM

## 2024-07-09 PROCEDURE — 99214 OFFICE O/P EST MOD 30 MIN: CPT | Performed by: INTERNAL MEDICINE

## 2024-07-09 PROCEDURE — 3052F HG A1C>EQUAL 8.0%<EQUAL 9.0%: CPT | Performed by: INTERNAL MEDICINE

## 2024-07-09 RX ORDER — HYDROCODONE BITARTRATE AND ACETAMINOPHEN 5; 325 MG/1; MG/1
1 TABLET ORAL 2 TIMES DAILY
Qty: 60 TABLET | Refills: 0 | Status: SHIPPED | OUTPATIENT
Start: 2024-07-09 | End: 2024-08-08

## 2024-07-09 ASSESSMENT — ENCOUNTER SYMPTOMS
NAUSEA: 0
EYE ITCHING: 0
ABDOMINAL DISTENTION: 0
VOMITING: 0
TROUBLE SWALLOWING: 0
SORE THROAT: 0
WHEEZING: 0
ABDOMINAL PAIN: 0
EYE DISCHARGE: 0
SHORTNESS OF BREATH: 0
BACK PAIN: 1
SINUS PRESSURE: 0
BLOOD IN STOOL: 0

## 2024-07-09 NOTE — PROGRESS NOTES
Neurological:      Mental Status: He is alert and oriented to person, place, and time.      Cranial Nerves: No cranial nerve deficit.      Motor: No abnormal muscle tone.      Coordination: Coordination normal.      Deep Tendon Reflexes: Reflexes are normal and symmetric. Reflexes normal.   Psychiatric:         Behavior: Behavior normal.         Thought Content: Thought content normal.         Judgment: Judgment normal.          Assessment:      Diagnosis Orders   1. Type 2 diabetes mellitus with hyperosmolarity without coma, without long-term current use of insulin ([facility])        2. Morbid obesity due to excess calories ([facility])        3. Lumbar radiculopathy              Assessment & Plan   Plan:     Diabetes mellitus.  He is doing a little better.  His A1c went from 9 7 down to 8.0.  He is doing it by watching his diet and he is on metformin 1000 mg twice daily.  He is also been trying to do some intermittent fasting which I told him he needs to make sure he does not get in a car and driving with metformin on board and have an AP stomach and become hypoglycemic and passed out.    Morbid obesity.  He is working on trying to diet and exercise weight is a big issue though contributing to his overall back pain and problems with his blood sugar.  His weight is up to 360 pounds.    Lumbar radiculopathy.  This is an ongoing issue with alleviated tremendously by weight loss.    Encouraged him to continue his dieting is try to get his A1c further down I will see him back again in 4 months with lab    No follow-ups on file.     Patient given educational materials- see patient instructions.  Discussed use, benefit, and side effects of prescribedmedications.  All patient questions answered.  Pt voiced understanding. Reviewedhealth maintenance.  Instructed to continue current medications, diet and exercise.Patient agreed with treatment plan.     **This report has been created usingPlanandooice recognition software. It may contain minor

## 2024-07-16 ENCOUNTER — HOSPITAL ENCOUNTER (OUTPATIENT)
Dept: PAIN MANAGEMENT | Age: 59
Discharge: HOME | End: 2024-07-16
Payer: COMMERCIAL

## 2024-07-16 VITALS
TEMPERATURE: 97.1 F | OXYGEN SATURATION: 91 % | DIASTOLIC BLOOD PRESSURE: 81 MMHG | RESPIRATION RATE: 18 BRPM | HEART RATE: 70 BPM | SYSTOLIC BLOOD PRESSURE: 149 MMHG

## 2024-07-16 PROCEDURE — 2500000003 HC RX 250 WO HCPCS

## 2024-07-16 PROCEDURE — 6360000002 HC RX W HCPCS

## 2024-07-16 PROCEDURE — 62323 NJX INTERLAMINAR LMBR/SAC: CPT

## 2024-07-16 PROCEDURE — 2580000003 HC RX 258

## 2024-07-16 RX ORDER — METHYLPREDNISOLONE ACETATE 80 MG/ML
80 INJECTION, SUSPENSION INTRA-ARTICULAR; INTRALESIONAL; INTRAMUSCULAR; SOFT TISSUE ONCE
Status: DISCONTINUED | OUTPATIENT
Start: 2024-07-16 | End: 2024-07-18 | Stop reason: HOSPADM

## 2024-07-16 RX ORDER — SODIUM CHLORIDE 9 MG/ML
5 INJECTION, SOLUTION INTRAMUSCULAR; INTRAVENOUS; SUBCUTANEOUS ONCE
Status: DISCONTINUED | OUTPATIENT
Start: 2024-07-16 | End: 2024-07-18 | Stop reason: HOSPADM

## 2024-07-16 RX ORDER — LIDOCAINE HYDROCHLORIDE 10 MG/ML
5 INJECTION, SOLUTION EPIDURAL; INFILTRATION; INTRACAUDAL; PERINEURAL ONCE
Status: DISCONTINUED | OUTPATIENT
Start: 2024-07-16 | End: 2024-07-18 | Stop reason: HOSPADM

## 2024-07-16 ASSESSMENT — PAIN - FUNCTIONAL ASSESSMENT: PAIN_FUNCTIONAL_ASSESSMENT: 0-10

## 2024-07-16 NOTE — INTERVAL H&P NOTE
Update History & Physical    The patient's History and Physical  was reviewed with the patient and I examined the patient. There was no change. The surgical site was confirmed by the patient and me.     Plan: The risks, benefits, expected outcome, and alternative to the recommended procedure have been discussed with the patient. Patient understands and wants to proceed with the procedure.     Electronically signed by Reed Duran MD on 7/16/2024 at 3:32 PM

## 2024-07-16 NOTE — PROGRESS NOTES
Procedure:  Level of Consciousness: [x]Alert [x]Oriented []Disoriented []Lethargic  Anxiety Level: [x]Calm []Anxious []Depressed []Other  Skin: []Warm [x]Dry []Cool []Moist []Intact []Other  Cardiovascular: [x]Palpitations: [x]Never []Occasionally []Frequently  Chest Pain: [x]No []Yes  Respiratory:  [x]Unlabored []Labored []Cough ([] Productive []Unproductive)  HCG Required: [x]No []Yes   Results: []Negative []Positive  Knowledge Level:        [x]Patient/Other verbalized understanding of pre-procedure instructions.        [x]Assessment of post-op care needs (transportation, responsible caregiver)        [x]Able to discuss health care problems and how to deal with it.  Factors that Affect Teaching:        Language Barrier: [x]No []Yes - why:        Hearing Loss:        [x]No []Yes            Corrective Device:  []Yes [x]No        Vision Loss:           []No [x]Yes            Corrective Device:  [x]Yes []No        Memory Loss:       [x]No []Yes            []Short Term []Long Term  Motivational Level:  [x]Asks Questions                  []Extremely Anxious       [x]Seems Interested               []Seems Uninterested                  []Denies need for Education  Risk for Injury:  [x]Patient oriented to person, place and time  []History of frequent falls/loss of balance  Nutritional:  []Change in appetite   []Weight Gain   []Weight Loss  Functional:  []Requires assistance with ADL's  
Admission

## 2024-08-14 ENCOUNTER — TELEPHONE (OUTPATIENT)
Dept: NEUROSURGERY | Facility: CLINIC | Age: 59
End: 2024-08-14

## 2024-08-14 ENCOUNTER — TELEPHONE (OUTPATIENT)
Dept: PAIN MANAGEMENT | Age: 59
End: 2024-08-14

## 2024-08-14 DIAGNOSIS — M54.42 CHRONIC BILATERAL LOW BACK PAIN WITH BILATERAL SCIATICA: ICD-10-CM

## 2024-08-14 DIAGNOSIS — M54.41 CHRONIC BILATERAL LOW BACK PAIN WITH BILATERAL SCIATICA: ICD-10-CM

## 2024-08-14 DIAGNOSIS — G89.29 CHRONIC BILATERAL LOW BACK PAIN WITH BILATERAL SCIATICA: ICD-10-CM

## 2024-08-14 RX ORDER — HYDROCODONE BITARTRATE AND ACETAMINOPHEN 5; 325 MG/1; MG/1
1 TABLET ORAL 2 TIMES DAILY
Qty: 60 TABLET | Refills: 0 | Status: SHIPPED | OUTPATIENT
Start: 2024-08-14 | End: 2024-09-13

## 2024-08-14 NOTE — TELEPHONE ENCOUNTER
"Caller: Gordy Erwin \"Dago\"    Relationship to patient: Self    Best call back number: [phone]    Patient is needing: PATIENT CALLED TO MAKE APPT. TO COME BACK TO BE SEEN FOR ONGOING BACK ISSUES-MADE APPT. TO SEE DIONICIO TATE-PT HAS NEW MRI FROM 06/27/24 AT [facility]-PT IS GETTING DISC AND WILL BRING WITH HIM-SENDING TO [facility] OFFICE AS MRI REPORT IS STILL NEEDED-THANK YOU        "

## 2024-08-14 NOTE — TELEPHONE ENCOUNTER
Patient left message on prescription line.  Call forwarded to nurse line.  Patient was seen in the office on 7/2/24.  Referral was made to Dr. Villa's office on 7/3/24 related to changes on MRI.  Patient stated he has never gotten a call regarding this appointment.  I called Dr. Villa's office and they said that they had not ever received the referral, however, patient has been seen there before.  Since he will be seen for worsening symptoms of the same diagnosis, they instructed that a new referral is not needed.  Patient may call to schedule at any time.  I have spoken with the patient.  He will call that office.  I did let him know that if he schedules after the first of the year, he will need a new referral at that time.  Patient also wanted to note that he had lumbar epidural and received no relief at all from the injection.

## 2024-08-27 ENCOUNTER — TELEPHONE (OUTPATIENT)
Dept: PAIN MANAGEMENT | Age: 59
End: 2024-08-27

## 2024-08-27 NOTE — TELEPHONE ENCOUNTER
[facility] calling to let provider know that patient is scheduled for appointment with Augusto Garcia on 9/9/24.  Any further questions can be addressed to [facility] at [phone].

## 2024-09-09 ENCOUNTER — OFFICE VISIT (OUTPATIENT)
Dept: NEUROSURGERY | Facility: CLINIC | Age: 59
End: 2024-09-09
Payer: COMMERCIAL

## 2024-09-09 VITALS — WEIGHT: 315 LBS | HEIGHT: 78 IN | BODY MASS INDEX: 36.45 KG/M2

## 2024-09-09 DIAGNOSIS — E66.01 CLASS 3 SEVERE OBESITY DUE TO EXCESS CALORIES WITH SERIOUS COMORBIDITY AND BODY MASS INDEX (BMI) OF 40.0 TO 44.9 IN ADULT: ICD-10-CM

## 2024-09-09 DIAGNOSIS — M79.604 PAIN IN BOTH LOWER EXTREMITIES: ICD-10-CM

## 2024-09-09 DIAGNOSIS — M79.605 PAIN IN BOTH LOWER EXTREMITIES: ICD-10-CM

## 2024-09-09 DIAGNOSIS — R20.0 NUMBNESS AND TINGLING OF BOTH LOWER EXTREMITIES: ICD-10-CM

## 2024-09-09 DIAGNOSIS — R20.2 NUMBNESS AND TINGLING OF BOTH LOWER EXTREMITIES: ICD-10-CM

## 2024-09-09 DIAGNOSIS — M54.50 LUMBAR BACK PAIN: Primary | ICD-10-CM

## 2024-09-09 PROCEDURE — 99214 OFFICE O/P EST MOD 30 MIN: CPT | Performed by: NURSE PRACTITIONER

## 2024-09-09 RX ORDER — HYDROCODONE BITARTRATE AND ACETAMINOPHEN 5; 325 MG/1; MG/1
1 TABLET ORAL
COMMUNITY
Start: 2024-08-14 | End: 2024-09-14
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
## 2024-09-09 NOTE — PATIENT INSTRUCTIONS
"DASH Eating Plan  DASH stands for Dietary Approaches to Stop Hypertension. The DASH eating plan is a healthy eating plan that has been shown to:  Lower high blood pressure (hypertension).  Reduce your risk for type 2 diabetes, heart disease, and stroke.  Help with weight loss.  What are tips for following this plan?  Reading food labels  Check food labels for the amount of salt (sodium) per serving. Choose foods with less than 5 percent of the Daily Value (DV) of sodium. In general, foods with less than 300 milligrams (mg) of sodium per serving fit into this eating plan.  To find whole grains, look for the word \"whole\" as the first word in the ingredient list.  Shopping  Buy products labeled as \"low-sodium\" or \"no salt added.\"  Buy fresh foods. Avoid canned foods and pre-made or frozen meals.  Cooking  Try not to add salt when you cook. Use salt-free seasonings or herbs instead of table salt or sea salt. Check with your health care provider or pharmacist before using salt substitutes.  Do not [place] foods. Cook foods in healthy ways, such as baking, boiling, grilling, roasting, or broiling.  Cook using oils that are good for your heart. These include olive, canola, avocado, soybean, and sunflower oil.  Meal planning    Eat a balanced diet. This should include:  4 or more servings of fruits and 4 or more servings of vegetables each day. Try to fill half of your plate with fruits and vegetables.  6-8 servings of whole grains each day.  6 or less servings of lean meat, poultry, or fish each day. 1 oz is 1 serving. A 3 oz (85 g) serving of meat is about the same size as the palm of your hand. One egg is 1 oz (28 g).  2-3 servings of low-fat dairy each day. One serving is 1 cup (237 mL).  1 serving of nuts, seeds, or beans 5 times each week.  2-3 servings of heart-healthy fats. Healthy fats called omega-3 fatty acids are found in foods such as walnuts, flaxseeds, fortified milks, and eggs. These fats are also found in " cold-water fish, such as sardines, salmon, and mackerel.  Limit how much you eat of:  Canned or prepackaged foods.  Food that is high in trans fat, such as fried foods.  Food that is high in saturated fat, such as fatty meat.  Desserts and other sweets, sugary drinks, and other foods with added sugar.  Full-fat dairy products.  Do not salt foods before eating.  Do not eat more than 4 egg yolks a week.  Try to eat at least 2 vegetarian meals a week.  Eat more home-cooked food and less restaurant, buffet, and fast food.  Lifestyle  When eating at a restaurant, ask if your food can be made with less salt or no salt.  If you drink alcohol:  Limit how much you have to:  0-1 drink a day if you are female.  0-2 drinks a day if you are male.  Know how much alcohol is in your drink. In the [place] one drink is one 12 oz bottle of beer (355 mL), one 5 oz glass of wine (148 mL), or one 1½ oz glass of hard liquor (44 mL).  General information  Avoid eating more than 2,300 mg of salt a day. If you have hypertension, you may need to reduce your sodium intake to 1,500 mg a day.  Work with your provider to stay at a healthy body weight or lose weight. Ask what the best weight range is for you.  On most days of the week, get at least 30 minutes of exercise that causes your heart to beat faster. This may include walking, swimming, or biking.  Work with your provider or dietitian to adjust your eating plan to meet your specific calorie needs.  What foods should I eat?  Fruits  All fresh, dried, or frozen fruit. Canned fruits that are in their natural juice and do not have sugar added to them.  Vegetables  Fresh or frozen vegetables that are raw, steamed, roasted, or grilled. Low-sodium or reduced-sodium tomato and vegetable juice. Low-sodium or reduced-sodium tomato sauce and tomato paste. Low-sodium or reduced-sodium canned vegetables.  Grains  Whole-grain or whole-wheat bread. Whole-grain or whole-wheat pasta. Brown rice. Oatmeal.  Quinoa. Bulgur. Whole-grain and low-sodium cereals. Zeny bread. Low-fat, low-sodium crackers. Whole-wheat flour tortillas.  Meats and other proteins  Skinless chicken or turkey. Ground chicken or turkey. Pork with fat trimmed off. Fish and seafood. Egg whites. Dried beans, peas, or lentils. Unsalted nuts, nut butters, and seeds. Unsalted canned beans. Lean cuts of beef with fat trimmed off. Low-sodium, lean precooked or cured meat, such as sausages or meat loaves.  Dairy  Low-fat (1%) or fat-free (skim) milk. Reduced-fat, low-fat, or fat-free cheeses. Nonfat, low-sodium ricotta or cottage cheese. Low-fat or nonfat yogurt. Low-fat, low-sodium cheese.  Fats and oils  Soft margarine without trans fats. Vegetable oil. Reduced-fat, low-fat, or light mayonnaise and salad dressings (reduced-sodium). Canola, safflower, olive, avocado, soybean, and sunflower oils. Avocado.  Seasonings and condiments  Herbs. Spices. Seasoning mixes without salt.  Other foods  Unsalted popcorn and pretzels. Fat-free sweets.  The items listed above may not be all the foods and drinks you can have. Talk to a dietitian to learn more.  What foods should I avoid?  Fruits  Canned fruit in a light or heavy syrup. Fried fruit. Fruit in cream or butter sauce.  Vegetables  Creamed or fried vegetables. Vegetables in a cheese sauce. Regular canned vegetables that are not marked as low-sodium or reduced-sodium. Regular canned tomato sauce and paste that are not marked as low-sodium or reduced-sodium. Regular tomato and vegetable juices that are not marked as low-sodium or reduced-sodium. Pickles. Olives.  Grains  Baked goods made with fat, such as croissants, muffins, or some breads. Dry pasta or rice meal packs.  Meats and other proteins  Fatty cuts of meat. Ribs. Fried meat. Flores. Bologna, salami, and other precooked or cured meats, such as sausages or meat loaves, that are not lean and low in sodium. Fat from the back of a pig (fatback). Dagoberto.  Salted nuts and seeds. Canned beans with added salt. Canned or smoked fish. Whole eggs or egg yolks. Chicken or turkey with skin.  Dairy  Whole or 2% milk, cream, and half-and-half. Whole or full-fat cream cheese. Whole-fat or sweetened yogurt. Full-fat cheese. Nondairy creamers. Whipped toppings. Processed cheese and cheese spreads.  Fats and oils  Butter. Stick margarine. Lard. Shortening. Ghee. Flores fat. Tropical oils, such as coconut, palm kernel, or palm oil.  Seasonings and condiments  Onion salt, garlic salt, seasoned salt, table salt, and sea salt. Worcestershire sauce. Tartar sauce. Barbecue sauce. Teriyaki sauce. Soy sauce, including reduced-sodium soy sauce. Steak sauce. Canned and packaged gravies. Fish sauce. Oyster sauce. Cocktail sauce. Store-bought horseradish. Ketchup. Mustard. Meat flavorings and tenderizers. Bouillon cubes. Hot sauces. Pre-made or packaged marinades. Pre-made or packaged taco seasonings. Relishes. Regular salad dressings.  Other foods  Salted popcorn and pretzels.  The items listed above may not be all the foods and drinks you should avoid. Talk to a dietitian to learn more.  Where to find more information  National Heart, Lung, and Blood [place] (NHLBI): nhlbi.nih.gov  American Heart Association (AHA): heart.org  Academy of Nutrition and Dietetics: eatright.org  National Kidney Foundation (NKF): kidney.org  This information is not intended to replace advice given to you by your health care provider. Make sure you discuss any questions you have with your health care provider.  Document Revised: 01/04/2024 Document Reviewed: 01/04/2024  Elsevier Patient Education © 2024 [employer]

## 2024-09-11 ENCOUNTER — TELEPHONE (OUTPATIENT)
Dept: PAIN MANAGEMENT | Age: 59
End: 2024-09-11

## 2024-09-11 NOTE — PROGRESS NOTES
[facility] - PODIATRY    Today's Date: 09/13/2024     Patient Name: Gordy Erwin  MRN: [id number]  CSN: [id number]  PCP: Hemal Gutierrez MD  Referring Provider: No ref. provider found    SUBJECTIVE     Chief Complaint   Patient presents with    Follow-up     Hemal Gutierrez MD 07/09/2024 1 YR FU DIABETIC- pt states right foot, surgical foot dec of 2017, , is having some heel pain, progressively getting worse, last year has really gotten bad. Other than that feet doing good- pt pain 7/10 at worst     Diabetes     HPI: Gordy Erwin, a 59 y.o.male, comes to clinic as a(n) established patient presenting for diabetic foot exam. Patient has h/o DM, obesity, back pain, h/o right flatfoot surgery . Patient is NIDDM and unsure of last BG level.  Admits to mild numbness in feet. Denies open wounds or sores.  Also has issues with his back with the right lower extremity being more numb and problematic in the left.  Previously had surgery in 2017 with Dr. Cabral due to a ruptured posterior tibial tendon.  Has had imaging performed after his last appt. Relates that he and his wife care for his nails just fine. Admits pain at 7/10 level and described as aching and nagging. Relates previous treatment(s) including foot/ankle surgery in 2017 per Dr. Cabral . Denies any constitutional symptoms. No other pedal complaints at this time.    Past Medical History:   Diagnosis Date    Diabetes mellitus     Low back pain     Obesity      Past Surgical History:   Procedure Laterality Date    FOOT SURGERY Right     GANGLION CYST EXCISION       Family History   Problem Relation Age of Onset    Heart disease Mother     Osteoporosis Mother     Suicidality Father     Bipolar disorder Father     No Known Problems Brother      Social History     Socioeconomic History    Marital status:    Tobacco Use    Smoking status: Former     Current packs/day: 0.00     Types: Cigarettes     Quit date: 10/2/2012      Years since quittin.9     Passive exposure: Past    Smokeless tobacco: Never   Vaping Use    Vaping status: Never Used   Substance and Sexual Activity    Alcohol use: No     Comment: rarely    Drug use: No    Sexual activity: Defer     Allergies   Allergen Reactions    Penicillins Shortness Of Breath    Contrast Dye (Echo Or Unknown Ct/Mr) Nausea Only    Iodine Nausea Only    Pregabalin Hives    Sulfa Antibiotics Hives    Toradol [Ketorolac Tromethamine] Hives and Itching     Current Outpatient Medications   Medication Sig Dispense Refill    cyclobenzaprine (FLEXERIL) 10 MG tablet Take 1 tablet by mouth 2 (Two) Times a Day As Needed for Muscle Spasms. 60 tablet 0    diclofenac (VOLTAREN) 1 % gel gel Apply 4 g topically to the appropriate area as directed 4 (Four) Times a Day As Needed.      diclofenac (VOLTAREN) 75 MG EC tablet 1 tablet.      HYDROcodone-acetaminophen (NORCO) 5-325 MG per tablet Take 1 tablet by mouth.      Lancets (OneTouch Delica Plus Abrzgf31I) misc 1 each by Other route Daily.      lidocaine (LIDODERM) 5 % Place 1 patch on the skin as directed by provider Daily. Remove & Discard patch within 12 hours or as directed by MD 15 each 0    metFORMIN (GLUCOPHAGE) 500 MG tablet Take 2 tablets by mouth Daily With Breakfast.      OneTouch Verio test strip 1 each by Other route 4 (Four) Times a Day.      tadalafil (CIALIS) 10 MG tablet Take 1 tablet by mouth Daily As Needed.      pregabalin (Lyrica) 50 MG capsule Take 1 capsule by mouth 3 (Three) Times a Day for 14 days. 42 capsule 0     No current facility-administered medications for this visit.     Review of Systems   Constitutional:  Negative for chills and fever.   HENT:  Negative for congestion.    Respiratory:  Negative for shortness of breath.    Cardiovascular:  Negative for chest pain and leg swelling.   Gastrointestinal:  Negative for constipation, diarrhea, nausea and vomiting.   Musculoskeletal:  Positive for arthralgias and back pain.  Negative for myalgias.   Skin:  Negative for wound.   Neurological:  Positive for numbness.       OBJECTIVE     Vitals:    09/13/24 1506   BP: 146/86   Pulse: 71   SpO2: 96%         PHYSICAL EXAM  GEN:   Accompanied by none.     Foot/Ankle Exam    GENERAL  Diabetic foot exam performed    Appearance:  appears stated age and obese  Orientation:  AAOx3  Affect:  appropriate  Gait:  unimpaired  Assistance:  independent  Right shoe gear: casual shoe  Left shoe gear: casual shoe    VASCULAR     Right Foot Vascularity   Dorsalis pedis:  2+  Posterior tibial:  2+  Skin temperature:  warm  Edema grading:  Trace and non-pitting (perimalleolar)  CFT:  3  Pedal hair growth:  Present  Varicosities:  spider veins     Left Foot Vascularity   Dorsalis pedis:  2+  Posterior tibial:  2+  Skin temperature:  warm  Edema grading:  None  CFT:  3  Pedal hair growth:  Present  Varicosities:  spider veins     NEUROLOGIC     Right Foot Neurologic   Light touch sensation: diminished  Vibratory sensation: diminished  Hot/Cold sensation: diminished  Protective Sensation using [place] Monofilament:   Sites intact: 3  Sites tested: 10     Left Foot Neurologic   Light touch sensation: diminished  Vibratory sensation: diminished  Hot/Cold sensation:  diminished  Protective Sensation using [place] Monofilament:   Sites intact: 6  Sites tested: 10    MUSCULOSKELETAL     Right Foot Musculoskeletal   Ecchymosis:  none  Tenderness:  lateral malleolus tenderness and posterior tibial tendon tenderness   (plantar heel)  Arch:  Normal  Hallux valgus: No       Left Foot Musculoskeletal   Ecchymosis:  none  Tenderness:  none  Arch:  Normal  Hallux valgus: No      MUSCLE STRENGTH     Right Foot Muscle Strength   Foot dorsiflexion:  5  Foot plantar flexion:  5  Foot inversion:  4+  Foot eversion:  5     Left Foot Muscle Strength   Foot dorsiflexion:  5  Foot plantar flexion:  5  Foot inversion:  5  Foot eversion:  5    RANGE OF MOTION      Right Foot Range of Motion   Foot and ankle ROM within normal limits       Left Foot Range of Motion   Foot and ankle ROM within normal limits      DERMATOLOGIC      Right Foot Dermatologic   Skin  Right foot skin is intact.   Nails  1.  Normal.  2.  Normal.  3.  Normal.  4.  Normal.  5.  Normal.     Left Foot Dermatologic   Skin  Left foot skin is intact.   Nails  1.  Normal.  2.  Normal.  3.  Normal.  4.  Normal.  5.  Normal.      RADIOLOGY/NUCLEAR:  No results found.    LABORATORY/CULTURE RESULTS:      PATHOLOGY RESULTS:       ASSESSMENT/PLAN     Diagnoses and all orders for this visit:    1. Type 2 diabetes mellitus with diabetic neuropathy, without long-term current use of insulin (Primary)    2. Encounter for diabetic foot exam    3. BMI 40.0-44.9, adult        Comprehensive lower extremity examination and evaluation was performed.  Discussed findings and treatment plan including risks, benefits, and treatment options with patient in detail. Patient agreed with treatment plan.  Diabetic foot exam performed.   Patient may maintain nails and calluses at home utilizing emery board or pumice stone between visits as needed  Reviewed at home diabetic foot care including daily foot checks   Reviewed updated x-rays with patient.  Continue diabetic monitoring and control under direction of PCP.  An After Visit Summary was printed and given to the patient at discharge, including (if requested) any available informative/educational handouts regarding diagnosis, treatment, or medications. All questions were answered to patient/family satisfaction. Should symptoms fail to improve or worsen they agree to call or return to clinic or to go to the Emergency Department. Discussed the importance of following up with any needed screening tests/labs/specialist appointments and any requested follow-up recommended by me today. Importance of maintaining follow-up discussed and patient accepts that missed appointments can delay  diagnosis and potentially lead to worsening of conditions.  Return in about 1 year (around 9/13/2025) for Follow-up with Podiatry APRN, Schedule [facility]., or sooner if acute issues arise.        This document has been electronically signed by George Mckeon DPM on September 13, 2024 15:26 CDT

## 2024-09-12 ENCOUNTER — TELEPHONE (OUTPATIENT)
Age: 59
End: 2024-09-12
Payer: COMMERCIAL

## 2024-09-12 NOTE — TELEPHONE ENCOUNTER
Hub to relay  Spoke with patient regarding appt on 09/13/2024. Patient confirmed date and time off appt.

## 2024-09-13 ENCOUNTER — OFFICE VISIT (OUTPATIENT)
Age: 59
End: 2024-09-13
Payer: COMMERCIAL

## 2024-09-13 VITALS
SYSTOLIC BLOOD PRESSURE: 146 MMHG | OXYGEN SATURATION: 96 % | DIASTOLIC BLOOD PRESSURE: 86 MMHG | HEIGHT: 78 IN | HEART RATE: 71 BPM | WEIGHT: 315 LBS | BODY MASS INDEX: 36.45 KG/M2

## 2024-09-13 DIAGNOSIS — E11.40 TYPE 2 DIABETES MELLITUS WITH DIABETIC NEUROPATHY, WITHOUT LONG-TERM CURRENT USE OF INSULIN: Primary | ICD-10-CM

## 2024-09-13 DIAGNOSIS — E11.9 ENCOUNTER FOR DIABETIC FOOT EXAM: ICD-10-CM

## 2024-09-16 DIAGNOSIS — M54.42 CHRONIC BILATERAL LOW BACK PAIN WITH BILATERAL SCIATICA: ICD-10-CM

## 2024-09-16 DIAGNOSIS — M54.41 CHRONIC BILATERAL LOW BACK PAIN WITH BILATERAL SCIATICA: ICD-10-CM

## 2024-09-16 DIAGNOSIS — Z79.4 TYPE 2 DIABETES MELLITUS WITHOUT COMPLICATION, WITH LONG-TERM CURRENT USE OF INSULIN (HCC): ICD-10-CM

## 2024-09-16 DIAGNOSIS — E11.9 TYPE 2 DIABETES MELLITUS WITHOUT COMPLICATION, WITH LONG-TERM CURRENT USE OF INSULIN (HCC): ICD-10-CM

## 2024-09-16 DIAGNOSIS — G89.29 CHRONIC BILATERAL LOW BACK PAIN WITH BILATERAL SCIATICA: ICD-10-CM

## 2024-09-16 RX ORDER — HYDROCODONE BITARTRATE AND ACETAMINOPHEN 5; 325 MG/1; MG/1
1 TABLET ORAL 2 TIMES DAILY
Qty: 60 TABLET | Refills: 0 | Status: SHIPPED | OUTPATIENT
Start: 2024-09-16 | End: 2024-10-16

## 2024-10-07 ENCOUNTER — HOSPITAL ENCOUNTER (OUTPATIENT)
Dept: NEUROLOGY | Facility: HOSPITAL | Age: 59
Discharge: HOME | End: 2024-10-07
Payer: COMMERCIAL

## 2024-10-07 ENCOUNTER — HOSPITAL ENCOUNTER (OUTPATIENT)
Dept: CT IMAGING | Facility: HOSPITAL | Age: 59
Discharge: HOME | End: 2024-10-07
Payer: COMMERCIAL

## 2024-10-07 DIAGNOSIS — M79.604 PAIN IN BOTH LOWER EXTREMITIES: ICD-10-CM

## 2024-10-07 DIAGNOSIS — M54.50 LUMBAR BACK PAIN: ICD-10-CM

## 2024-10-07 DIAGNOSIS — M79.605 PAIN IN BOTH LOWER EXTREMITIES: ICD-10-CM

## 2024-10-07 DIAGNOSIS — R20.2 NUMBNESS AND TINGLING OF BOTH LOWER EXTREMITIES: ICD-10-CM

## 2024-10-07 DIAGNOSIS — R20.0 NUMBNESS AND TINGLING OF BOTH LOWER EXTREMITIES: ICD-10-CM

## 2024-10-07 PROCEDURE — 95886 MUSC TEST DONE W/N TEST COMP: CPT

## 2024-10-07 PROCEDURE — 95912 NRV CNDJ TEST 11-12 STUDIES: CPT

## 2024-10-07 PROCEDURE — 72131 CT LUMBAR SPINE W/O DYE: CPT

## 2024-10-08 ENCOUNTER — DOCUMENTATION (OUTPATIENT)
Dept: NEUROSURGERY | Facility: CLINIC | Age: 59
End: 2024-10-08
Payer: COMMERCIAL

## 2024-10-16 DIAGNOSIS — G89.29 CHRONIC BILATERAL LOW BACK PAIN WITH BILATERAL SCIATICA: ICD-10-CM

## 2024-10-16 DIAGNOSIS — M54.42 CHRONIC BILATERAL LOW BACK PAIN WITH BILATERAL SCIATICA: ICD-10-CM

## 2024-10-16 DIAGNOSIS — M54.41 CHRONIC BILATERAL LOW BACK PAIN WITH BILATERAL SCIATICA: ICD-10-CM

## 2024-10-16 RX ORDER — HYDROCODONE BITARTRATE AND ACETAMINOPHEN 5; 325 MG/1; MG/1
1 TABLET ORAL 2 TIMES DAILY
Qty: 60 TABLET | Refills: 0 | Status: SHIPPED | OUTPATIENT
Start: 2024-10-16 | End: 2024-11-15

## 2024-11-14 DIAGNOSIS — M19.079 OSTEOARTHRITIS OF ANKLE, UNSPECIFIED LATERALITY, UNSPECIFIED OSTEOARTHRITIS TYPE: ICD-10-CM

## 2024-11-14 DIAGNOSIS — G89.29 CHRONIC BILATERAL LOW BACK PAIN WITH BILATERAL SCIATICA: ICD-10-CM

## 2024-11-14 DIAGNOSIS — M54.42 CHRONIC BILATERAL LOW BACK PAIN WITH BILATERAL SCIATICA: ICD-10-CM

## 2024-11-14 DIAGNOSIS — M54.41 CHRONIC BILATERAL LOW BACK PAIN WITH BILATERAL SCIATICA: ICD-10-CM

## 2024-11-15 DIAGNOSIS — M54.16 LUMBAR RADICULOPATHY: ICD-10-CM

## 2024-11-15 DIAGNOSIS — E11.00 TYPE 2 DIABETES MELLITUS WITH HYPEROSMOLARITY WITHOUT COMA, WITHOUT LONG-TERM CURRENT USE OF INSULIN (HCC): ICD-10-CM

## 2024-11-15 DIAGNOSIS — E66.01 MORBID OBESITY DUE TO EXCESS CALORIES: ICD-10-CM

## 2024-11-15 LAB
ALBUMIN SERPL-MCNC: 4.4 G/DL (ref 3.5–5.2)
ALP SERPL-CCNC: 50 U/L (ref 40–129)
ALT SERPL-CCNC: 68 U/L (ref 5–41)
ANION GAP SERPL CALCULATED.3IONS-SCNC: 12 MMOL/L (ref 7–19)
AST SERPL-CCNC: 39 U/L (ref 5–40)
BILIRUB SERPL-MCNC: 0.4 MG/DL (ref 0.2–1.2)
BUN SERPL-MCNC: 14 MG/DL (ref 6–20)
CALCIUM SERPL-MCNC: 9.5 MG/DL (ref 8.6–10)
CHLORIDE SERPL-SCNC: 101 MMOL/L (ref 98–111)
CHOLEST SERPL-MCNC: 208 MG/DL (ref 0–199)
CO2 SERPL-SCNC: 27 MMOL/L (ref 22–29)
CREAT SERPL-MCNC: 1 MG/DL (ref 0.7–1.2)
GLUCOSE SERPL-MCNC: 185 MG/DL (ref 70–99)
HBA1C MFR BLD: 9 % (ref 4–5.6)
HDLC SERPL-MCNC: 36 MG/DL (ref 40–60)
LDLC SERPL CALC-MCNC: 132 MG/DL
POTASSIUM SERPL-SCNC: 4.1 MMOL/L (ref 3.5–5)
PROT SERPL-MCNC: 6.7 G/DL (ref 6.4–8.3)
SODIUM SERPL-SCNC: 140 MMOL/L (ref 136–145)
TRIGL SERPL-MCNC: 200 MG/DL (ref 0–149)

## 2024-11-18 ENCOUNTER — OFFICE VISIT (OUTPATIENT)
Dept: NEUROSURGERY | Facility: CLINIC | Age: 59
End: 2024-11-18
Payer: COMMERCIAL

## 2024-11-18 VITALS — BODY MASS INDEX: 36.45 KG/M2 | HEIGHT: 78 IN | WEIGHT: 315 LBS

## 2024-11-18 DIAGNOSIS — G60.9 HEREDITARY AND IDIOPATHIC PERIPHERAL NEUROPATHY: ICD-10-CM

## 2024-11-18 DIAGNOSIS — E66.813 CLASS 3 SEVERE OBESITY DUE TO EXCESS CALORIES WITH SERIOUS COMORBIDITY AND BODY MASS INDEX (BMI) OF 40.0 TO 44.9 IN ADULT: ICD-10-CM

## 2024-11-18 DIAGNOSIS — M47.819 FACET ARTHROPATHY: ICD-10-CM

## 2024-11-18 DIAGNOSIS — M54.16 LUMBAR RADICULOPATHY: ICD-10-CM

## 2024-11-18 DIAGNOSIS — E66.01 CLASS 3 SEVERE OBESITY DUE TO EXCESS CALORIES WITH SERIOUS COMORBIDITY AND BODY MASS INDEX (BMI) OF 40.0 TO 44.9 IN ADULT: ICD-10-CM

## 2024-11-18 DIAGNOSIS — M51.372 DEGENERATION OF INTERVERTEBRAL DISC OF LUMBOSACRAL REGION WITH DISCOGENIC BACK PAIN AND LOWER EXTREMITY PAIN: Primary | ICD-10-CM

## 2024-11-18 DIAGNOSIS — Z78.9 NON-SMOKER: ICD-10-CM

## 2024-11-18 PROCEDURE — 99214 OFFICE O/P EST MOD 30 MIN: CPT | Performed by: NEUROLOGICAL SURGERY

## 2024-11-18 NOTE — PATIENT INSTRUCTIONS
"PATIENT TO CONTINUE TO FOLLOW UP WITH HIS PRIMARY CARE PROVIDER FOR YEARLY PHYSICAL EXAMS TO ENSURE COMPLETE HEALTH MAINTENANCE      BMI for Adults  Body mass index (BMI) is a number found using a person's weight and height. BMI can help tell how much of a person's weight is made up of fat. BMI does not measure body fat directly. It is used instead of tests that directly measure body fat, which can be difficult and expensive.  What are BMI measurements used for?  BMI is useful to:  Find out if your weight puts you at higher risk for medical problems.  Help recommend changes, such as in diet and exercise. This can help you reach a healthy weight. BMI screening can be done again to see if these changes are working.  How is BMI calculated?  Your height and weight are measured. The BMI is found from those numbers. This can be done with U[place] or metric measurements. Note that charts and online BMI calculators are available to help you find your BMI quickly and easily without doing these calculations.  To calculate your BMI in U.[place] measurements:  Measure your weight in pounds (lb).  Multiply the number of pounds by 703.  So, for an adult who weighs 150 lb, multiply that number by 703: 150 x 703, which equals 105,450.  Measure your height in inches. Then multiply that number by itself to get a measurement called \"inches squared.\"  So, for an adult who is 70 inches tall, the \"inches squared\" measurement is 70 inches x 70 inches, which equals 4,900 inches squared.  Divide the total from step 2 (number of lb x 703) by the total from step 3 (inches squared): 105,450 ÷ 4,900 = 21.5. This is your BMI.  To calculate your BMI in metric measurements:    Measure your weight in kilograms (kg).  For this example, the weight is 70 kg.  Measure your height in meters (m). Then multiply that number by itself to get a measurement called \"meters squared.\"  So, for an adult who is 1.75 m tall, the \"meters squared\" measurement is 1.75 m x 1.75 " m, which equals 3.1 meters squared.  Divide the number of kilograms (your weight) by the meters squared number. In this example: 70 ÷ 3.1 = 22.6. This is your BMI.  What do the results mean?  BMI charts are used to see if you are underweight, normal weight, overweight, or obese. The following guidelines will be used:  Underweight: BMI less than 18.5.  Normal weight: BMI between 18.5 and 24.9.  Overweight: BMI between 25 and 29.9.  Obese: BMI of 30 or above.  BMI is a tool and cannot diagnose a condition. Talk with your health care provider about what your BMI means for you. Keep these notes in mind:  Weight includes fat and muscle. Someone with a muscular build, such as an athlete, may have a BMI that is higher than 24.9. In cases like these, BMI is not a correct measure of body fat.  If you have a BMI of 25 or higher, your provider may need to do more testing to find out if excess body fat is the cause.  BMI is measured the same way for males and females. Females usually have more body fat than males of the same height and weight.  Where to find more information  For more information about BMI, including tools to quickly find your BMI, go to:  Centers for Disease Control and Prevention: cdc.gov  American Heart Association: heart.org  National Heart, Lung, and Blood [place]: nhlbi.nih.gov  This information is not intended to replace advice given to you by your health care provider. Make sure you discuss any questions you have with your health care provider.  Document Revised: 09/07/2023 Document Reviewed: 08/31/2023  [employer] Patient Education © 2024 [employer]  DASH Eating Plan  DASH stands for Dietary Approaches to Stop Hypertension. The DASH eating plan is a healthy eating plan that has been shown to:  Lower high blood pressure (hypertension).  Reduce your risk for type 2 diabetes, heart disease, and stroke.  Help with weight loss.  What are tips for following this plan?  Reading food labels  Check food  "labels for the amount of salt (sodium) per serving. Choose foods with less than 5 percent of the Daily Value (DV) of sodium. In general, foods with less than 300 milligrams (mg) of sodium per serving fit into this eating plan.  To find whole grains, look for the word \"whole\" as the first word in the ingredient list.  Shopping  Buy products labeled as \"low-sodium\" or \"no salt added.\"  Buy fresh foods. Avoid canned foods and pre-made or frozen meals.  Cooking  Try not to add salt when you cook. Use salt-free seasonings or herbs instead of table salt or sea salt. Check with your health care provider or pharmacist before using salt substitutes.  Do not [place] foods. Cook foods in healthy ways, such as baking, boiling, grilling, roasting, or broiling.  Cook using oils that are good for your heart. These include olive, canola, avocado, soybean, and sunflower oil.  Meal planning    Eat a balanced diet. This should include:  4 or more servings of fruits and 4 or more servings of vegetables each day. Try to fill half of your plate with fruits and vegetables.  6-8 servings of whole grains each day.  6 or less servings of lean meat, poultry, or fish each day. 1 oz is 1 serving. A 3 oz (85 g) serving of meat is about the same size as the palm of your hand. One egg is 1 oz (28 g).  2-3 servings of low-fat dairy each day. One serving is 1 cup (237 mL).  1 serving of nuts, seeds, or beans 5 times each week.  2-3 servings of heart-healthy fats. Healthy fats called omega-3 fatty acids are found in foods such as walnuts, flaxseeds, fortified milks, and eggs. These fats are also found in cold-water fish, such as sardines, salmon, and mackerel.  Limit how much you eat of:  Canned or prepackaged foods.  Food that is high in trans fat, such as fried foods.  Food that is high in saturated fat, such as fatty meat.  Desserts and other sweets, sugary drinks, and other foods with added sugar.  Full-fat dairy products.  Do not salt foods before " eating.  Do not eat more than 4 egg yolks a week.  Try to eat at least 2 vegetarian meals a week.  Eat more home-cooked food and less restaurant, buffet, and fast food.  Lifestyle  When eating at a restaurant, ask if your food can be made with less salt or no salt.  If you drink alcohol:  Limit how much you have to:  0-1 drink a day if you are female.  0-2 drinks a day if you are male.  Know how much alcohol is in your drink. In the [place] one drink is one 12 oz bottle of beer (355 mL), one 5 oz glass of wine (148 mL), or one 1½ oz glass of hard liquor (44 mL).  General information  Avoid eating more than 2,300 mg of salt a day. If you have hypertension, you may need to reduce your sodium intake to 1,500 mg a day.  Work with your provider to stay at a healthy body weight or lose weight. Ask what the best weight range is for you.  On most days of the week, get at least 30 minutes of exercise that causes your heart to beat faster. This may include walking, swimming, or biking.  Work with your provider or dietitian to adjust your eating plan to meet your specific calorie needs.  What foods should I eat?  Fruits  All fresh, dried, or frozen fruit. Canned fruits that are in their natural juice and do not have sugar added to them.  Vegetables  Fresh or frozen vegetables that are raw, steamed, roasted, or grilled. Low-sodium or reduced-sodium tomato and vegetable juice. Low-sodium or reduced-sodium tomato sauce and tomato paste. Low-sodium or reduced-sodium canned vegetables.  Grains  Whole-grain or whole-wheat bread. Whole-grain or whole-wheat pasta. Brown rice. Oatmeal. Quinoa. Bulgur. Whole-grain and low-sodium cereals. Zeny bread. Low-fat, low-sodium crackers. Whole-wheat flour tortillas.  Meats and other proteins  Skinless chicken or turkey. Ground chicken or turkey. Pork with fat trimmed off. Fish and seafood. Egg whites. Dried beans, peas, or lentils. Unsalted nuts, nut butters, and seeds. Unsalted canned beans. Lean  cuts of beef with fat trimmed off. Low-sodium, lean precooked or cured meat, such as sausages or meat loaves.  Dairy  Low-fat (1%) or fat-free (skim) milk. Reduced-fat, low-fat, or fat-free cheeses. Nonfat, low-sodium ricotta or cottage cheese. Low-fat or nonfat yogurt. Low-fat, low-sodium cheese.  Fats and oils  Soft margarine without trans fats. Vegetable oil. Reduced-fat, low-fat, or light mayonnaise and salad dressings (reduced-sodium). Canola, safflower, olive, avocado, soybean, and sunflower oils. Avocado.  Seasonings and condiments  Herbs. Spices. Seasoning mixes without salt.  Other foods  Unsalted popcorn and pretzels. Fat-free sweets.  The items listed above may not be all the foods and drinks you can have. Talk to a dietitian to learn more.  What foods should I avoid?  Fruits  Canned fruit in a light or heavy syrup. Fried fruit. Fruit in cream or butter sauce.  Vegetables  Creamed or fried vegetables. Vegetables in a cheese sauce. Regular canned vegetables that are not marked as low-sodium or reduced-sodium. Regular canned tomato sauce and paste that are not marked as low-sodium or reduced-sodium. Regular tomato and vegetable juices that are not marked as low-sodium or reduced-sodium. Pickles. Olives.  Grains  Baked goods made with fat, such as croissants, muffins, or some breads. Dry pasta or rice meal packs.  Meats and other proteins  Fatty cuts of meat. Ribs. Fried meat. Flores. Bologna, salami, and other precooked or cured meats, such as sausages or meat loaves, that are not lean and low in sodium. Fat from the back of a pig (fatback). Bratwurst. Salted nuts and seeds. Canned beans with added salt. Canned or smoked fish. Whole eggs or egg yolks. Chicken or turkey with skin.  Dairy  Whole or 2% milk, cream, and half-and-half. Whole or full-fat cream cheese. Whole-fat or sweetened yogurt. Full-fat cheese. Nondairy creamers. Whipped toppings. Processed cheese and cheese spreads.  Fats and oils  Butter.  Stick margarine. Lard. Shortening. Ghee. Flores fat. Tropical oils, such as coconut, palm kernel, or palm oil.  Seasonings and condiments  Onion salt, garlic salt, seasoned salt, table salt, and sea salt. Worcestershire sauce. Tartar sauce. Barbecue sauce. Teriyaki sauce. Soy sauce, including reduced-sodium soy sauce. Steak sauce. Canned and packaged gravies. Fish sauce. Oyster sauce. Cocktail sauce. Store-bought horseradish. Ketchup. Mustard. Meat flavorings and tenderizers. Bouillon cubes. Hot sauces. Pre-made or packaged marinades. Pre-made or packaged taco seasonings. Relishes. Regular salad dressings.  Other foods  Salted popcorn and pretzels.  The items listed above may not be all the foods and drinks you should avoid. Talk to a dietitian to learn more.  Where to find more information  National Heart, Lung, and Blood [place] (NHLBI): nhlbi.nih.gov  American Heart Association (AHA): heart.org  Academy of Nutrition and Dietetics: eatright.org  National Kidney Foundation (NKF): kidney.org  This information is not intended to replace advice given to you by your health care provider. Make sure you discuss any questions you have with your health care provider.  Document Revised: 01/04/2024 Document Reviewed: 01/04/2024  [employer] Patient Education © 2024 [employer]

## 2024-11-19 ENCOUNTER — OFFICE VISIT (OUTPATIENT)
Dept: INTERNAL MEDICINE | Age: 59
End: 2024-11-19

## 2024-11-19 VITALS
SYSTOLIC BLOOD PRESSURE: 138 MMHG | WEIGHT: 315 LBS | OXYGEN SATURATION: 96 % | BODY MASS INDEX: 36.45 KG/M2 | HEIGHT: 78 IN | HEART RATE: 75 BPM | DIASTOLIC BLOOD PRESSURE: 74 MMHG

## 2024-11-19 DIAGNOSIS — Z79.4 TYPE 2 DIABETES MELLITUS WITHOUT COMPLICATION, WITH LONG-TERM CURRENT USE OF INSULIN (HCC): ICD-10-CM

## 2024-11-19 DIAGNOSIS — Z23 FLU VACCINE NEED: Primary | ICD-10-CM

## 2024-11-19 DIAGNOSIS — E11.9 TYPE 2 DIABETES MELLITUS WITHOUT COMPLICATION, WITH LONG-TERM CURRENT USE OF INSULIN (HCC): ICD-10-CM

## 2024-11-19 DIAGNOSIS — E78.00 HYPERCHOLESTEREMIA: ICD-10-CM

## 2024-11-19 RX ORDER — DICLOFENAC SODIUM 75 MG/1
75 TABLET, DELAYED RELEASE ORAL 2 TIMES DAILY
Qty: 60 TABLET | Refills: 2 | Status: SHIPPED | OUTPATIENT
Start: 2024-11-19 | End: 2025-02-17

## 2024-11-19 RX ORDER — HYDROCODONE BITARTRATE AND ACETAMINOPHEN 5; 325 MG/1; MG/1
1 TABLET ORAL 2 TIMES DAILY
Qty: 60 TABLET | Refills: 0 | Status: SHIPPED | OUTPATIENT
Start: 2024-11-19 | End: 2024-11-20 | Stop reason: SDUPTHER

## 2024-11-19 ASSESSMENT — ENCOUNTER SYMPTOMS
TROUBLE SWALLOWING: 0
SINUS PRESSURE: 0
EYE DISCHARGE: 0
BACK PAIN: 0
VOMITING: 0
ABDOMINAL DISTENTION: 0
NAUSEA: 0
WHEEZING: 0
ABDOMINAL PAIN: 0
SORE THROAT: 0
BLOOD IN STOOL: 0
EYE ITCHING: 0
SHORTNESS OF BREATH: 0

## 2024-11-19 NOTE — PROGRESS NOTES
Reflexes: Reflexes are normal and symmetric. Reflexes normal.   Psychiatric:         Behavior: Behavior normal.         Thought Content: Thought content normal.         Judgment: Judgment normal.          Assessment:      Diagnosis Orders   1. Flu vaccine need  Influenza, FLUCELVAX Trivalent, (age 6 mo+) IM, Preservative Free, 0.5mL      2. Type 2 diabetes mellitus without complication, with long-term current use of insulin (HCC)        3. Hypercholesteremia              Assessment & Plan   Plan:     Type 2 diabetes mellitus.  Hemoglobin A1c is gone back up to 9 he had a down to 8 on his last visit.  Has been under a lot of stress with his wife who is had a sort of bilateral retinal detachments and he has been stress eating and having to do all the cooking while she is on bedrest.  He wants continuous glucose monitor and he thinking that may help him be more cognizant of what is going on with his sugar and I do not think it is a bad plan will give him a sample today of the Dexcom.  Will recheck his numbers in 4 months.  Told him to increase his exercise and is stopped buying the foods that he stress eats on getting out of the house.    Hypercholesterol.  Numbers are about the same.    Will have him see back in 4 months with lab and continue the same medication and work on dietary compliance    No follow-ups on file.     Patient given educational materials- see patient instructions.  Discussed use, benefit, and side effects of prescribedmedications.  All patient questions answered.  Pt voiced understanding. Reviewedhealth maintenance.  Instructed to continue current medications, diet and exercise.Patient agreed with treatment plan.     **This report has been created usingvoice recognition software. It may contain minor errors which are inherent in voicerecognition technology.**    Electronically signed by Janusz Ruiz MD on 11/19/2024 at 3:15 PM

## 2024-11-20 ENCOUNTER — HOSPITAL ENCOUNTER (OUTPATIENT)
Dept: PAIN MANAGEMENT | Age: 59
Discharge: HOME | End: 2024-11-20
Payer: COMMERCIAL

## 2024-11-20 VITALS
SYSTOLIC BLOOD PRESSURE: 128 MMHG | TEMPERATURE: 97.3 F | RESPIRATION RATE: 16 BRPM | BODY MASS INDEX: 36.45 KG/M2 | DIASTOLIC BLOOD PRESSURE: 76 MMHG | HEIGHT: 78 IN | WEIGHT: 315 LBS | OXYGEN SATURATION: 96 % | HEART RATE: 59 BPM

## 2024-11-20 DIAGNOSIS — G57.01 PIRIFORMIS SYNDROME OF RIGHT SIDE: ICD-10-CM

## 2024-11-20 DIAGNOSIS — E11.42 DIABETIC PERIPHERAL NEUROPATHY (HCC): ICD-10-CM

## 2024-11-20 DIAGNOSIS — M54.42 CHRONIC BILATERAL LOW BACK PAIN WITH BILATERAL SCIATICA: ICD-10-CM

## 2024-11-20 DIAGNOSIS — F11.90 CHRONIC, CONTINUOUS USE OF OPIOIDS: ICD-10-CM

## 2024-11-20 DIAGNOSIS — M53.3 SACROILIAC JOINT DYSFUNCTION OF BOTH SIDES: Primary | ICD-10-CM

## 2024-11-20 DIAGNOSIS — M47.816 FACET ARTHROPATHY, LUMBAR: ICD-10-CM

## 2024-11-20 DIAGNOSIS — G89.29 CHRONIC BILATERAL LOW BACK PAIN WITH BILATERAL SCIATICA: ICD-10-CM

## 2024-11-20 DIAGNOSIS — M54.41 CHRONIC BILATERAL LOW BACK PAIN WITH BILATERAL SCIATICA: ICD-10-CM

## 2024-11-20 DIAGNOSIS — E11.69 TYPE 2 DIABETES MELLITUS WITH OTHER SPECIFIED COMPLICATION, WITHOUT LONG-TERM CURRENT USE OF INSULIN (HCC): ICD-10-CM

## 2024-11-20 PROCEDURE — 99213 OFFICE O/P EST LOW 20 MIN: CPT

## 2024-11-20 ASSESSMENT — PAIN DESCRIPTION - LOCATION: LOCATION: BACK

## 2024-11-20 ASSESSMENT — ENCOUNTER SYMPTOMS
EYES NEGATIVE: 1
RESPIRATORY NEGATIVE: 1
BOWEL INCONTINENCE: 0
GASTROINTESTINAL NEGATIVE: 1
BACK PAIN: 1

## 2024-11-20 ASSESSMENT — PAIN DESCRIPTION - DIRECTION: RADIATING_TOWARDS: RIGHT LEG

## 2024-11-20 ASSESSMENT — PAIN SCALES - GENERAL: PAINLEVEL_OUTOF10: 5

## 2024-11-20 ASSESSMENT — PAIN DESCRIPTION - ORIENTATION: ORIENTATION: LOWER

## 2024-11-20 NOTE — PROGRESS NOTES
[facility]  [address]  [place] [address]  Phone: [phone]  Fax: [phone]        Follow Up Office Visit    Patient Name: Kenneth Rowell    MR #: [id number]    Account #:[id number]    : 1965    Age: 59 y.o.    Sex: male    Date:     PCP: Janusz Ruiz MD    Chief Complaint:   Chief Complaint   Patient presents with    Post-Op Check       History of Present Illness:   The patient is a 59 y.o. male who presents for follow up on primary complaints of Low back pain that radiates down his leg- R>L. He has been established at this office since 2024. He also sees Dr. Villa with neurosurgery at [facility]. PMH includes DM2 and morbid obesity. A1C earlier this month was 9.0 and renal function was normal. He initially completed PT and other conservative therapies- cymbalta. He had no relief, so MRI was ordered and he was placed on norco BID. He was also placed on elavil and voltaren last visit.     He admits his DM2 is not well controlled, allergy to sulfonylureas and GLP1s. Has not trialed SGLT2s in the past. Discussed with patient importance of controlling DM2 to prevent worsening neuropathy.     After recent imaging, Dr. Villa advised against surgery at this time - pain is felt to be more from diabetic neuropathy. He has constant numbness of toes on right foot. Walks on pins and needles- it runs from knees along calves to ankles. Has a sensation that he is walking on something wet at times as well. Neuropathy keeps him awake at night sometimes.     He received his first injections- LESI L5-S1 on 24. Patient did not have at least 80-85% relief of pain from procedure(s) for at least 8 weeks. Patient did not receive enough pain relief from injections to repeat the injection(s).     Describes pain as bilateral low back along SI joints. Pain not relieved by leaning forward. Only relieved by lying down. Cannot walk long distances.     Patient

## 2024-11-20 NOTE — TELEPHONE ENCOUNTER
There are no diagnoses linked to this encounter.    Requested Prescriptions      No prescriptions requested or ordered in this encounter       Continue medication with refill sent at appointment yes; refill sent to medical director at appointment yes, see refill encounter dated 11/20/2024    Electronically signed by ANNAMARIE Gtz CNP on 11/20/2024 at 10:40 AM

## 2024-11-20 NOTE — PROGRESS NOTES
Clinic Documentation      Education Provided:  [x] Review of Eriberto  [] Agreement Review  [x] PEG Score Calculated [] PHQ Score Calculated [] ORT Score Calculated    [] Compliance Issues Discussed [] Cognitive Behavior Needs [x] Exercise [] Review of Test [] Financial Issues  [x] Tobacco/Alcohol Use Reviewed [x] Teaching [] New Patient [] Picture Obtained    Physician Plan:  [] Outgoing Referral  [] Pharmacy Consult  [] Test Ordered [x] Prescription Ordered/Changed   [] Obtained Test Results / Consult Notes        Complete if patient is withholding blood thinner for procedure     Blood Thinner Patient is currently taking:      [] Plavix (Hold for 7 days)  [] Aspirin (Hold for 5 days)     [] Pletal (Hold for 2 days)  [] Pradaxa (Hold for 3 days)    [] Effient (Hold for 7 days)  [] Xarelto (Hold for 2 days)    [] Eliquis (Hold for 2 days)  [] Brilinta (Hold for 7 days)    [] Coumadin (Hold for 5 days) - (INR needs to be drawn the day prior to procedure- INR < 2.0)    [] Aggrenox (Hold for 7 days)        [] Patient will stop medication on their own.    [] Blood Thinner Form Faxed for approval to hold.   Provider form faxed to:     Assessment Completed by:  Electronically signed by Tanisha Brody MA on 11/20/2024 at 10:50 AM

## 2024-11-23 RX ORDER — HYDROCODONE BITARTRATE AND ACETAMINOPHEN 5; 325 MG/1; MG/1
1 TABLET ORAL 2 TIMES DAILY
Qty: 60 TABLET | Refills: 0 | Status: SHIPPED | OUTPATIENT
Start: 2024-11-23 | End: 2024-12-23

## 2024-12-05 ENCOUNTER — HOSPITAL ENCOUNTER (OUTPATIENT)
Dept: PAIN MANAGEMENT | Age: 59
Discharge: HOME | End: 2024-12-05
Payer: COMMERCIAL

## 2024-12-05 VITALS
DIASTOLIC BLOOD PRESSURE: 67 MMHG | HEART RATE: 73 BPM | RESPIRATION RATE: 16 BRPM | TEMPERATURE: 97.1 F | SYSTOLIC BLOOD PRESSURE: 129 MMHG | OXYGEN SATURATION: 96 %

## 2024-12-05 DIAGNOSIS — G57.01 PIRIFORMIS SYNDROME OF RIGHT SIDE: ICD-10-CM

## 2024-12-05 DIAGNOSIS — M53.3 SACROILIAC JOINT DYSFUNCTION OF BOTH SIDES: Primary | ICD-10-CM

## 2024-12-05 DIAGNOSIS — M79.18 MYALGIA, OTHER SITE: ICD-10-CM

## 2024-12-05 PROCEDURE — 20553 NJX 1/MLT TRIGGER POINTS 3/>: CPT

## 2024-12-05 PROCEDURE — 6360000002 HC RX W HCPCS

## 2024-12-05 PROCEDURE — 20611 DRAIN/INJ JOINT/BURSA W/US: CPT

## 2024-12-05 PROCEDURE — 76942 ECHO GUIDE FOR BIOPSY: CPT

## 2024-12-05 RX ORDER — LIDOCAINE HYDROCHLORIDE 10 MG/ML
1 INJECTION, SOLUTION EPIDURAL; INFILTRATION; INTRACAUDAL; PERINEURAL ONCE
Status: DISCONTINUED | OUTPATIENT
Start: 2024-12-05 | End: 2024-12-07 | Stop reason: HOSPADM

## 2024-12-05 RX ORDER — LIDOCAINE HYDROCHLORIDE 10 MG/ML
10 INJECTION, SOLUTION EPIDURAL; INFILTRATION; INTRACAUDAL; PERINEURAL ONCE
Status: DISCONTINUED | OUTPATIENT
Start: 2024-12-05 | End: 2024-12-07 | Stop reason: HOSPADM

## 2024-12-05 RX ORDER — BUPIVACAINE HYDROCHLORIDE 5 MG/ML
10 INJECTION, SOLUTION EPIDURAL; INTRACAUDAL ONCE
Status: DISCONTINUED | OUTPATIENT
Start: 2024-12-05 | End: 2024-12-07 | Stop reason: HOSPADM

## 2024-12-05 RX ORDER — TRIAMCINOLONE ACETONIDE 40 MG/ML
40 INJECTION, SUSPENSION INTRA-ARTICULAR; INTRAMUSCULAR ONCE
Status: DISCONTINUED | OUTPATIENT
Start: 2024-12-05 | End: 2024-12-07 | Stop reason: HOSPADM

## 2024-12-05 RX ORDER — ETHYL CHLORIDE 100 %
AEROSOL, SPRAY (ML) TOPICAL PRN
Status: DISCONTINUED | OUTPATIENT
Start: 2024-12-05 | End: 2024-12-07 | Stop reason: HOSPADM

## 2024-12-05 RX ORDER — BUPIVACAINE HYDROCHLORIDE 5 MG/ML
1 INJECTION, SOLUTION EPIDURAL; INTRACAUDAL ONCE
Status: DISCONTINUED | OUTPATIENT
Start: 2024-12-05 | End: 2024-12-07 | Stop reason: HOSPADM

## 2024-12-05 NOTE — DISCHARGE INSTRUCTIONS
[facility] Physical And Pain Medicine  Post Procedure Discharge Instructions        YOU HAVE HAD THE FOLLOWING PROCEDURE:                                  [] Occipital Nerve Blocks  [] CTS wrist injection(s)  [] Knee Injection(s)         [] Shoulder Injection(s)   [] Elbow Injection(s)     [] Botox Injection  [] Cervical Trigger Point Injections    [] Thoracic Trigger Point Injections    [] Lumbar Trigger Point Injections  [x] Piriformis Trigger Point Injections  [x] SI Joint Injection(s)     [] Trochanteric Bursa Injection(s)       [] Ankle Injection(s)   [] Plantar Fasciitis   []  ______________  Injection(s) [] Botox []  Migraines [] Spasticity    YOU HAVE RECEIVED THE FOLLOWING MEDICATIONS IN YOUR INJECTION(s)  [x] Lidocaine [x] Bupivacaine   [] DepoMedrol (steroid) [] Decadron (steroid)  [x]  Kenalog (steroid)   [] Toradol  [] Supartz [] Zilretta    [] Botox        PATIENT INFORMATION:   You may experience the following symptoms after your procedure. These symptoms are normal and should not cause concern:    You may have an increase in your pain. This may last 24 - 48 hours after your procedure.  You may have no change in the pain that you had prior to your injection(s).  You may have weakness or numbness in your affected extremity. If this occurs, this may last until numbing the medication wears off.     REPORT THE FOLLOWING SYMPTOMS TO YOUR DOCTOR:  Redness, swelling or drainage at the injection site(s)  Unusual pain that interferes with your normal activities of daily living.    OTHER INSTRUCTIONS:    [x] I will apply ice to the injection site(s) for at least 24 hours after the procedure. I will rotate the ice on for 20 minutes and off for 20 minutes for at least 24 hours.    [x] I will not apply heat for at least 48 hours and I will not take a hot bath or shower for at least 24 hours.     [x] I understand that if Lidocaine or Bupivacaine was used in my injection(s) that the injection site(s)

## 2024-12-05 NOTE — PROCEDURES
PROCEDURE NOTE  Date: 2024   Name: Kenneth Rowell  YOB: 1965    Procedures    [facility] Physical & Pain Medicine    Patient Name: Kenneth Rowell    : 1965    Age: 59 y.o.    Sex: male    Date: 2024    Performing Procedure:  Lisa Dalal APRN - CNP    Patient Vitals for the past 24 hrs:   BP Temp Temp src Pulse Resp SpO2   24 1535 129/67 97.1 °F (36.2 °C) Temporal 73 16 96 %       Pre-op Diagnosis: right  myalgia    Post-op Diagnosis: right  myalgia    Procedure: Ultrasound Guided Injection of  right Sacroiliac Joint(s)     right  Sacroiliac Joint(s)     Description of Procedure:    After voluntary, informed and signed consent obtained the patient was placed in a prone position. Appropriate time out was obtained per policy. The Sacroiliac Joint(s) was palpated for area of maximal tenderness. The area was prepped in an aseptic fashion with CHG swab. The ultrasound transducer was used to confirm the appropriate location. The skin was sprayed with Gebauer's Solution. Under aseptic technique and direct ultrasound visualization a 22 gauge 3 inch spinal needle was introduced into the Sacroiliac Joint(s). Patient was asked if any new pain was radiating down the ipsilateral lower extremity. If patient noted radiating pain the needle was readjusted until radiating pain was gone. After a negative aspiration, a solution of 1 ml of 0.5% Marcaine Plain and 1 ml of 1% Lidocaine Plain    with    [x] 1 ml of Kenalog (40mg/ml)    [] 0.5 ml of DepoMedrol (80mg/ml)    [] 1 ml of DepoMedrol (80mg/ml)     [] 1 ml of Toradol (30mg/ml)     was injected into the Sacroiliac Joint(s). The needle was withdrawn and a sterile dressing applied. If this was a bilateral procedure, the same steps were followed on the opposite side.     Procedure: Piriformis Trigger Point Injections      Description of Procedure:    After a brief physical assessment and failure to improve with

## 2024-12-05 NOTE — PROGRESS NOTES
Procedure:  Level of Consciousness: [x]Alert [x]Oriented []Disoriented []Lethargic  Anxiety Level: [x]Calm []Anxious []Depressed []Other  Skin: [x]Warm [x]Dry []Cool []Moist []Intact []Other  Cardiovascular: []Palpitations: [x]Never []Occasionally []Frequently  Chest Pain: [x]No []Yes  Respiratory:  [x]Unlabored []Labored []Cough ([] Productive []Unproductive)  HCG Required: [x]No []Yes   Results: []Negative []Positive  Knowledge Level:        [x]Patient/Other verbalized understanding of pre-procedure instructions.        [x]Assessment of post-op care needs (transportation, responsible caregiver)        [x]Able to discuss health care problems and how to deal with it.  Factors that Affect Teaching:        Language Barrier: [x]No []Yes - why:        Hearing Loss:        [x]No []Yes            Corrective Device:  []Yes []No        Vision Loss:           []No []Yes            Corrective Device:  [x]Yes []No        Memory Loss:       [x]No []Yes            []Short Term []Long Term  Motivational Level:  [x]Asks Questions                  []Extremely Anxious       [x]Seems Interested               []Seems Uninterested                  [x]Denies need for Education  Risk for Injury:  [x]Patient oriented to person, place and time  []History of frequent falls/loss of balance  Nutritional:  []Change in appetite   []Weight Gain   []Weight Loss  Functional:  []Requires assistance with ADL's

## 2024-12-12 ENCOUNTER — TELEPHONE (OUTPATIENT)
Dept: PAIN MANAGEMENT | Age: 59
End: 2024-12-12

## 2024-12-18 DIAGNOSIS — M54.42 CHRONIC BILATERAL LOW BACK PAIN WITH BILATERAL SCIATICA: ICD-10-CM

## 2024-12-18 DIAGNOSIS — M54.41 CHRONIC BILATERAL LOW BACK PAIN WITH BILATERAL SCIATICA: ICD-10-CM

## 2024-12-18 DIAGNOSIS — G89.29 CHRONIC BILATERAL LOW BACK PAIN WITH BILATERAL SCIATICA: ICD-10-CM

## 2024-12-19 RX ORDER — HYDROCODONE BITARTRATE AND ACETAMINOPHEN 5; 325 MG/1; MG/1
1 TABLET ORAL 2 TIMES DAILY
Qty: 60 TABLET | Refills: 0 | Status: SHIPPED | OUTPATIENT
Start: 2024-12-19 | End: 2025-01-18

## 2025-01-20 DIAGNOSIS — G89.29 CHRONIC BILATERAL LOW BACK PAIN WITH BILATERAL SCIATICA: ICD-10-CM

## 2025-01-20 DIAGNOSIS — M54.42 CHRONIC BILATERAL LOW BACK PAIN WITH BILATERAL SCIATICA: ICD-10-CM

## 2025-01-20 DIAGNOSIS — M54.41 CHRONIC BILATERAL LOW BACK PAIN WITH BILATERAL SCIATICA: ICD-10-CM

## 2025-01-20 RX ORDER — HYDROCODONE BITARTRATE AND ACETAMINOPHEN 5; 325 MG/1; MG/1
1 TABLET ORAL 2 TIMES DAILY
Qty: 60 TABLET | Refills: 0 | Status: SHIPPED | OUTPATIENT
Start: 2025-01-20 | End: 2025-02-19

## 2025-01-20 NOTE — TELEPHONE ENCOUNTER
Last OV - 11/20/24  Next OV - 04/02/25 - pt will need a sooner appt  Last UDS - 04/15/24    Pt called 01/20/25 @0908

## 2025-02-17 SDOH — ECONOMIC STABILITY: INCOME INSECURITY: IN THE LAST 12 MONTHS, WAS THERE A TIME WHEN YOU WERE NOT ABLE TO PAY THE MORTGAGE OR RENT ON TIME?: NO

## 2025-02-17 SDOH — ECONOMIC STABILITY: FOOD INSECURITY: WITHIN THE PAST 12 MONTHS, YOU WORRIED THAT YOUR FOOD WOULD RUN OUT BEFORE YOU GOT MONEY TO BUY MORE.: NEVER TRUE

## 2025-02-17 SDOH — ECONOMIC STABILITY: TRANSPORTATION INSECURITY
IN THE PAST 12 MONTHS, HAS THE LACK OF TRANSPORTATION KEPT YOU FROM MEDICAL APPOINTMENTS OR FROM GETTING MEDICATIONS?: NO

## 2025-02-17 SDOH — ECONOMIC STABILITY: FOOD INSECURITY: WITHIN THE PAST 12 MONTHS, THE FOOD YOU BOUGHT JUST DIDN'T LAST AND YOU DIDN'T HAVE MONEY TO GET MORE.: NEVER TRUE

## 2025-02-17 ASSESSMENT — PATIENT HEALTH QUESTIONNAIRE - PHQ9
SUM OF ALL RESPONSES TO PHQ QUESTIONS 1-9: 0
SUM OF ALL RESPONSES TO PHQ9 QUESTIONS 1 & 2: 0
1. LITTLE INTEREST OR PLEASURE IN DOING THINGS: NOT AT ALL
2. FEELING DOWN, DEPRESSED OR HOPELESS: NOT AT ALL

## 2025-02-18 DIAGNOSIS — Z23 FLU VACCINE NEED: ICD-10-CM

## 2025-02-18 DIAGNOSIS — Z79.4 TYPE 2 DIABETES MELLITUS WITHOUT COMPLICATION, WITH LONG-TERM CURRENT USE OF INSULIN (HCC): ICD-10-CM

## 2025-02-18 DIAGNOSIS — E11.9 TYPE 2 DIABETES MELLITUS WITHOUT COMPLICATION, WITH LONG-TERM CURRENT USE OF INSULIN (HCC): ICD-10-CM

## 2025-02-18 DIAGNOSIS — E78.00 HYPERCHOLESTEREMIA: ICD-10-CM

## 2025-02-18 DIAGNOSIS — M19.079 OSTEOARTHRITIS OF ANKLE, UNSPECIFIED LATERALITY, UNSPECIFIED OSTEOARTHRITIS TYPE: ICD-10-CM

## 2025-02-18 DIAGNOSIS — M54.41 CHRONIC BILATERAL LOW BACK PAIN WITH BILATERAL SCIATICA: ICD-10-CM

## 2025-02-18 DIAGNOSIS — G89.29 CHRONIC BILATERAL LOW BACK PAIN WITH BILATERAL SCIATICA: ICD-10-CM

## 2025-02-18 DIAGNOSIS — M54.42 CHRONIC BILATERAL LOW BACK PAIN WITH BILATERAL SCIATICA: ICD-10-CM

## 2025-02-18 LAB
BASOPHILS # BLD: 0.1 K/UL (ref 0–0.2)
BASOPHILS NFR BLD: 1 % (ref 0–1)
CHOLEST SERPL-MCNC: 202 MG/DL (ref 0–199)
EOSINOPHIL # BLD: 0.3 K/UL (ref 0–0.6)
EOSINOPHIL NFR BLD: 3.1 % (ref 0–5)
ERYTHROCYTE [DISTWIDTH] IN BLOOD BY AUTOMATED COUNT: 13.7 % (ref 11.5–14.5)
HCT VFR BLD AUTO: 47.6 % (ref 42–52)
HDLC SERPL-MCNC: 36 MG/DL (ref 40–60)
HGB BLD-MCNC: 15.1 G/DL (ref 14–18)
IMM GRANULOCYTES # BLD: 0.1 K/UL
LDLC SERPL CALC-MCNC: 124 MG/DL
LYMPHOCYTES # BLD: 3.1 K/UL (ref 1.1–4.5)
LYMPHOCYTES NFR BLD: 31.9 % (ref 20–40)
MCH RBC QN AUTO: 28.2 PG (ref 27–31)
MCHC RBC AUTO-ENTMCNC: 31.7 G/DL (ref 33–37)
MCV RBC AUTO: 89 FL (ref 80–94)
MONOCYTES # BLD: 0.9 K/UL (ref 0–0.9)
MONOCYTES NFR BLD: 8.9 % (ref 0–10)
NEUTROPHILS # BLD: 5.4 K/UL (ref 1.5–7.5)
NEUTS SEG NFR BLD: 54.4 % (ref 50–65)
PLATELET # BLD AUTO: 218 K/UL (ref 130–400)
PMV BLD AUTO: 11.2 FL (ref 9.4–12.4)
RBC # BLD AUTO: 5.35 M/UL (ref 4.7–6.1)
TRIGL SERPL-MCNC: 210 MG/DL (ref 0–149)
WBC # BLD AUTO: 9.8 K/UL (ref 4.8–10.8)

## 2025-02-18 RX ORDER — DICLOFENAC SODIUM 75 MG/1
75 TABLET, DELAYED RELEASE ORAL 2 TIMES DAILY
Qty: 60 TABLET | Refills: 2 | Status: SHIPPED | OUTPATIENT
Start: 2025-02-18 | End: 2025-05-19

## 2025-02-18 RX ORDER — HYDROCODONE BITARTRATE AND ACETAMINOPHEN 5; 325 MG/1; MG/1
1 TABLET ORAL 2 TIMES DAILY
Qty: 60 TABLET | Refills: 0 | Status: SHIPPED | OUTPATIENT
Start: 2025-02-19 | End: 2025-03-21

## 2025-02-18 ASSESSMENT — PATIENT HEALTH QUESTIONNAIRE - PHQ9
2. FEELING DOWN, DEPRESSED OR HOPELESS: NOT AT ALL
SUM OF ALL RESPONSES TO PHQ9 QUESTIONS 1 & 2: 0
1. LITTLE INTEREST OR PLEASURE IN DOING THINGS: NOT AT ALL

## 2025-02-18 NOTE — TELEPHONE ENCOUNTER
Last seen in office visit: 11/20/24  Next scheduled office visit: 4/2/25 with Lisa  Last UDS results: neg for Lortab, ok per NP    Any discrepancies on Eriberto (such as refills from another provider): none

## 2025-02-25 ENCOUNTER — OFFICE VISIT (OUTPATIENT)
Dept: INTERNAL MEDICINE | Age: 60
End: 2025-02-25
Payer: COMMERCIAL

## 2025-02-25 VITALS
DIASTOLIC BLOOD PRESSURE: 80 MMHG | OXYGEN SATURATION: 96 % | HEIGHT: 78 IN | WEIGHT: 315 LBS | HEART RATE: 61 BPM | BODY MASS INDEX: 36.45 KG/M2 | SYSTOLIC BLOOD PRESSURE: 124 MMHG

## 2025-02-25 DIAGNOSIS — Z79.4 TYPE 2 DIABETES MELLITUS WITHOUT COMPLICATION, WITH LONG-TERM CURRENT USE OF INSULIN (HCC): ICD-10-CM

## 2025-02-25 DIAGNOSIS — E78.00 HYPERCHOLESTEREMIA: Primary | ICD-10-CM

## 2025-02-25 DIAGNOSIS — E11.9 TYPE 2 DIABETES MELLITUS WITHOUT COMPLICATION, WITH LONG-TERM CURRENT USE OF INSULIN (HCC): ICD-10-CM

## 2025-02-25 DIAGNOSIS — M54.16 LUMBAR RADICULOPATHY: ICD-10-CM

## 2025-02-25 LAB — HBA1C MFR BLD: 8.6 %

## 2025-02-25 PROCEDURE — 3052F HG A1C>EQUAL 8.0%<EQUAL 9.0%: CPT | Performed by: INTERNAL MEDICINE

## 2025-02-25 PROCEDURE — 99214 OFFICE O/P EST MOD 30 MIN: CPT | Performed by: INTERNAL MEDICINE

## 2025-02-25 PROCEDURE — 83036 HEMOGLOBIN GLYCOSYLATED A1C: CPT | Performed by: INTERNAL MEDICINE

## 2025-02-25 ASSESSMENT — ENCOUNTER SYMPTOMS
SHORTNESS OF BREATH: 0
SINUS PRESSURE: 0
ABDOMINAL DISTENTION: 0
VOMITING: 0
WHEEZING: 0
TROUBLE SWALLOWING: 0
EYE ITCHING: 0
NAUSEA: 0
EYE DISCHARGE: 0
BLOOD IN STOOL: 0
BACK PAIN: 0
SORE THROAT: 0
ABDOMINAL PAIN: 0

## 2025-02-25 NOTE — PROGRESS NOTES
[facility]  [facility]  [address] [place] [address] [address]IVE  [address]  [place] [address]  Dept: [phone]  Dept Fax: [phone]  Loc: [phone]      Visit Date: 2025    Kenneth Holden a 59 y.o. male who presents today for:  Chief Complaint   Patient presents with    Follow-up         HPI:     In for follow-up visit with lab for review.    Past Medical History:   Diagnosis Date    Arthritis     Back pain     Chronic back pain     Diabetes mellitus (HCC)     Erectile dysfunction     Obesity     Seasonal allergies     Type 2 diabetes mellitus without complication (HCC) 2018      Past Surgical History:   Procedure Laterality Date    COLONOSCOPY N/A 04/15/2022    Dr HANS Yee-Apparent submucosal lesion noted ? Lipoma, r/o rule out adenomatous mucosal tissue.in the right colon-AP x 1, HP x 3, BCM x 1, 3 yr recall    CYST REMOVAL      right wrist    ENDOSCOPY, COLON, DIAGNOSTIC      TYMPANOSTOMY TUBE PLACEMENT      UPPER GASTROINTESTINAL ENDOSCOPY         Family History   Problem Relation Age of Onset    Atrial Fibrillation Mother     Heart Disease Mother     High Blood Pressure Mother     Breast Cancer Maternal Aunt     Heart Attack Paternal Uncle        Social History     Tobacco Use    Smoking status: Former     Current packs/day: 0.00     Average packs/day: 1.5 packs/day for 18.7 years (28.0 ttl pk-yrs)     Types: Cigarettes     Start date: 1993     Quit date: 2012     Years since quittin.0    Smokeless tobacco: Never   Substance Use Topics    Alcohol use: Not Currently      Current Outpatient Medications   Medication Sig Dispense Refill    HYDROcodone-acetaminophen (NORCO) 5-325 MG per tablet Take 1 tablet by mouth 2 times daily for 30 days. (May fill 25) 60 tablet 0    diclofenac (VOLTAREN) 75 MG EC tablet Take 1 tablet by mouth 2 times daily 60 tablet 2    metFORMIN (GLUCOPHAGE) 1000 MG tablet Take 1 tablet by mouth 2 times daily (with meals) 180 tablet

## 2025-03-19 DIAGNOSIS — E11.9 TYPE 2 DIABETES MELLITUS WITHOUT COMPLICATION, WITH LONG-TERM CURRENT USE OF INSULIN: ICD-10-CM

## 2025-03-19 DIAGNOSIS — Z79.4 TYPE 2 DIABETES MELLITUS WITHOUT COMPLICATION, WITH LONG-TERM CURRENT USE OF INSULIN: ICD-10-CM

## 2025-04-07 ENCOUNTER — OFFICE VISIT (OUTPATIENT)
Dept: PAIN MANAGEMENT | Age: 60
End: 2025-04-07
Payer: COMMERCIAL

## 2025-04-07 VITALS
OXYGEN SATURATION: 98 % | HEIGHT: 78 IN | WEIGHT: 315 LBS | SYSTOLIC BLOOD PRESSURE: 137 MMHG | BODY MASS INDEX: 36.45 KG/M2 | DIASTOLIC BLOOD PRESSURE: 88 MMHG | RESPIRATION RATE: 16 BRPM | TEMPERATURE: 97.2 F | HEART RATE: 62 BPM

## 2025-04-07 DIAGNOSIS — Z51.81 ENCOUNTER FOR MONITORING OPIOID MAINTENANCE THERAPY: ICD-10-CM

## 2025-04-07 DIAGNOSIS — M54.42 CHRONIC BILATERAL LOW BACK PAIN WITH BILATERAL SCIATICA: Primary | ICD-10-CM

## 2025-04-07 DIAGNOSIS — Z02.89 PAIN MANAGEMENT CONTRACT AGREEMENT: ICD-10-CM

## 2025-04-07 DIAGNOSIS — M54.41 CHRONIC BILATERAL LOW BACK PAIN WITH BILATERAL SCIATICA: Primary | ICD-10-CM

## 2025-04-07 DIAGNOSIS — Z79.891 ENCOUNTER FOR MONITORING OPIOID MAINTENANCE THERAPY: ICD-10-CM

## 2025-04-07 DIAGNOSIS — G89.29 CHRONIC BILATERAL LOW BACK PAIN WITH BILATERAL SCIATICA: Primary | ICD-10-CM

## 2025-04-07 DIAGNOSIS — M54.16 LUMBAR RADICULOPATHY: ICD-10-CM

## 2025-04-07 PROCEDURE — 99214 OFFICE O/P EST MOD 30 MIN: CPT

## 2025-04-07 RX ORDER — HYDROCODONE BITARTRATE AND ACETAMINOPHEN 5; 325 MG/1; MG/1
1 TABLET ORAL EVERY 12 HOURS PRN
COMMUNITY
End: 2025-04-07 | Stop reason: SDUPTHER

## 2025-04-07 RX ORDER — HYDROCODONE BITARTRATE AND ACETAMINOPHEN 5; 325 MG/1; MG/1
1 TABLET ORAL EVERY 12 HOURS PRN
Qty: 45 TABLET | Refills: 0 | Status: SHIPPED | OUTPATIENT
Start: 2025-04-07 | End: 2025-05-07

## 2025-04-07 ASSESSMENT — PATIENT HEALTH QUESTIONNAIRE - PHQ9
SUM OF ALL RESPONSES TO PHQ QUESTIONS 1-9: 0
SUM OF ALL RESPONSES TO PHQ QUESTIONS 1-9: 0
1. LITTLE INTEREST OR PLEASURE IN DOING THINGS: NOT AT ALL
2. FEELING DOWN, DEPRESSED OR HOPELESS: NOT AT ALL
SUM OF ALL RESPONSES TO PHQ QUESTIONS 1-9: 0
SUM OF ALL RESPONSES TO PHQ QUESTIONS 1-9: 0

## 2025-04-07 ASSESSMENT — ENCOUNTER SYMPTOMS
RESPIRATORY NEGATIVE: 1
EYES NEGATIVE: 1
BACK PAIN: 1
GASTROINTESTINAL NEGATIVE: 1

## 2025-04-07 NOTE — PROGRESS NOTES
with a history of overdose, substance use disorder, >50 OME's daily, or concurrent benzodiazepine use should be prescribed naloxone. We instructed the patient to keep one nasal spray on his/her person, and instruct his/her family and friends to use 2 sprays under their nose in the event of accidental overdose, then to call 911.    Potential opioid side effects were discussed in detail including constipation, urinary retention, pruritus, respiratory depression, sedation, dependence, addiction, tolerance, opioid induced hyperalgesia, osteopenia, hypogonadism and immune suppression.  Advised to take the opioid medications as prescribed.    Our mission at the [facility] is to improve the lives of our patients by improving function and relieving pain. We have a multidisciplinary outlook with a focus on non-opioid medications, interventions, physical therapy and improved mindset. We strive to improve function and relieve pain with a focus on health and individualizing care.      [] Benzodiazapines and Narcotics:  Patient educated on the possible effects of combining Benzodiazapines and Opioids. Explained \"Black Box Warnings\" such as; possible suppressed breathing, hypoxia, anoxia, depressed cognition, heart arrhythmia, coma and possible death. Patient verbalized understanding concerning possible effects.     [] Tobacco Cessation:  Patient educated on the harms of tobacco/nicotine use and was provided with resources on available programs to assist in smoking cessation. Patient does show understanding.  Patient has/does not have the desire to quit smoking in the near future. Quit [employer] [place] is a FREE online service available to [place] residents 15 years of age and over. When you become a member,it offers a free telephone service, so you can speak to a  in person, if you would prefer. Call the QuitLine at 4-846-QUITNOW or 1-247.110.8304.  special tools, a support team of coaches, research-based

## 2025-04-25 DIAGNOSIS — Z79.891 ENCOUNTER FOR MONITORING OPIOID MAINTENANCE THERAPY: ICD-10-CM

## 2025-04-25 DIAGNOSIS — Z51.81 ENCOUNTER FOR MONITORING OPIOID MAINTENANCE THERAPY: ICD-10-CM

## 2025-05-07 ENCOUNTER — OFFICE VISIT (OUTPATIENT)
Dept: INTERNAL MEDICINE | Age: 60
End: 2025-05-07
Payer: COMMERCIAL

## 2025-05-07 VITALS
HEART RATE: 60 BPM | BODY MASS INDEX: 36.45 KG/M2 | WEIGHT: 315 LBS | HEIGHT: 78 IN | OXYGEN SATURATION: 96 % | DIASTOLIC BLOOD PRESSURE: 88 MMHG | SYSTOLIC BLOOD PRESSURE: 130 MMHG

## 2025-05-07 DIAGNOSIS — H66.92 OTITIS OF LEFT EAR: Primary | ICD-10-CM

## 2025-05-07 DIAGNOSIS — H93.8X2 EAR FULLNESS, LEFT: ICD-10-CM

## 2025-05-07 DIAGNOSIS — E11.65 UNCONTROLLED TYPE 2 DIABETES MELLITUS WITH HYPERGLYCEMIA (HCC): ICD-10-CM

## 2025-05-07 DIAGNOSIS — H92.02 EARACHE SYMPTOMS IN LEFT EAR: ICD-10-CM

## 2025-05-07 PROCEDURE — 3052F HG A1C>EQUAL 8.0%<EQUAL 9.0%: CPT | Performed by: INTERNAL MEDICINE

## 2025-05-07 PROCEDURE — 99213 OFFICE O/P EST LOW 20 MIN: CPT | Performed by: INTERNAL MEDICINE

## 2025-05-07 RX ORDER — LEVOFLOXACIN 500 MG/1
500 TABLET, FILM COATED ORAL DAILY
Qty: 7 TABLET | Refills: 0 | Status: SHIPPED | OUTPATIENT
Start: 2025-05-07 | End: 2025-05-14

## 2025-05-07 RX ORDER — NEOMYCIN SULFATE, POLYMYXIN B SULFATE, HYDROCORTISONE 3.5; 10000; 1 MG/ML; [USP'U]/ML; MG/ML
3 SOLUTION/ DROPS AURICULAR (OTIC) 3 TIMES DAILY
Qty: 10 ML | Refills: 0 | Status: SHIPPED | OUTPATIENT
Start: 2025-05-07 | End: 2025-05-17

## 2025-05-07 ASSESSMENT — ENCOUNTER SYMPTOMS
CHEST TIGHTNESS: 0
CONSTIPATION: 0
WHEEZING: 0
ABDOMINAL PAIN: 0
SORE THROAT: 0
COUGH: 0

## 2025-05-07 NOTE — PROGRESS NOTES
Chief Complaint   Patient presents with    Ear Fullness     Both ears     Ear Pain     Left ear pain that started this morning      History of presenting illness:  Kenneth Rowell is a59 y.o. male who presents today for follow up on his chronic medical conditions as noted below.    Patient Active Problem List    Diagnosis Date Noted    Atypical chest pain 06/16/2022     Priority: Medium    Chronic, continuous use of opioids 11/20/2024    Sacroiliac joint dysfunction of both sides 11/20/2024    Diabetic peripheral neuropathy (HCC) 11/20/2024    Piriformis syndrome of right side 11/20/2024    Chronic bilateral low back pain with bilateral sciatica 05/29/2024    Encounter for monitoring opioid maintenance therapy 04/15/2024    Myalgia, other site 01/30/2024    Facet arthropathy, lumbar 01/30/2024    Pain management contract agreement 01/30/2024    Lumbar radiculopathy 02/15/2022    Acute non-recurrent pansinusitis 08/19/2019    Morbid obesity due to excess calories (HCC) 02/22/2018    Hypercholesteremia 02/22/2018    Osteoarthritis of ankle 02/22/2018    Type 2 diabetes mellitus (HCC) 02/22/2018     Past Medical History:   Diagnosis Date    Arthritis     Back pain     Chronic back pain 2019    Diabetes mellitus (HCC)     Erectile dysfunction     Joint pain 2015    Low back pain 2000    Lumbosacral disc disease 2000    Neuropathy 2020    Neuropathy in diabetes (HCC) 2020    Obesity     Seasonal allergies     Spinal stenosis 2020    Type 2 diabetes mellitus without complication (HCC) 2018      Past Surgical History:   Procedure Laterality Date    COLONOSCOPY N/A 04/15/2022    Dr HANS Yee-Apparent submucosal lesion noted ? Lipoma, r/o rule out adenomatous mucosal tissue.in the right colon-AP x 1, HP x 3, BCM x 1, 3 yr recall    CYST REMOVAL      right wrist    ENDOSCOPY, COLON, DIAGNOSTIC      TYMPANOSTOMY TUBE PLACEMENT      UPPER GASTROINTESTINAL ENDOSCOPY       Current Outpatient Medications   Medication Sig

## 2025-05-21 DIAGNOSIS — M54.42 CHRONIC BILATERAL LOW BACK PAIN WITH BILATERAL SCIATICA: ICD-10-CM

## 2025-05-21 DIAGNOSIS — G89.29 CHRONIC BILATERAL LOW BACK PAIN WITH BILATERAL SCIATICA: ICD-10-CM

## 2025-05-21 DIAGNOSIS — M54.41 CHRONIC BILATERAL LOW BACK PAIN WITH BILATERAL SCIATICA: ICD-10-CM

## 2025-05-21 DIAGNOSIS — M19.079 OSTEOARTHRITIS OF ANKLE, UNSPECIFIED LATERALITY, UNSPECIFIED OSTEOARTHRITIS TYPE: ICD-10-CM

## 2025-05-21 RX ORDER — HYDROCODONE BITARTRATE AND ACETAMINOPHEN 5; 325 MG/1; MG/1
1 TABLET ORAL EVERY 12 HOURS PRN
Qty: 45 TABLET | Refills: 0 | Status: SHIPPED | OUTPATIENT
Start: 2025-05-21 | End: 2025-06-20

## 2025-05-21 RX ORDER — DICLOFENAC SODIUM 75 MG/1
75 TABLET, DELAYED RELEASE ORAL 2 TIMES DAILY
Qty: 60 TABLET | Refills: 2 | Status: SHIPPED | OUTPATIENT
Start: 2025-05-21 | End: 2025-08-19

## 2025-05-21 NOTE — TELEPHONE ENCOUNTER
Last seen in office visit: 04/07/25  Next scheduled office visit: 05/27/25  Last UDS results: 04/07/25  Any discrepancies on Eriberto (such as refills from another provider): no

## 2025-05-23 DIAGNOSIS — Z79.4 TYPE 2 DIABETES MELLITUS WITHOUT COMPLICATION, WITH LONG-TERM CURRENT USE OF INSULIN (HCC): ICD-10-CM

## 2025-05-23 DIAGNOSIS — E78.00 HYPERCHOLESTEREMIA: ICD-10-CM

## 2025-05-23 DIAGNOSIS — E11.9 TYPE 2 DIABETES MELLITUS WITHOUT COMPLICATION, WITH LONG-TERM CURRENT USE OF INSULIN (HCC): ICD-10-CM

## 2025-05-23 DIAGNOSIS — M54.16 LUMBAR RADICULOPATHY: ICD-10-CM

## 2025-05-23 LAB
ALBUMIN SERPL-MCNC: 4.5 G/DL (ref 3.5–5.2)
ALP SERPL-CCNC: 51 U/L (ref 40–129)
ALT SERPL-CCNC: 63 U/L (ref 10–50)
ANION GAP SERPL CALCULATED.3IONS-SCNC: 17 MMOL/L (ref 8–16)
AST SERPL-CCNC: 38 U/L (ref 10–50)
BASOPHILS # BLD: 0.1 K/UL (ref 0–0.2)
BASOPHILS NFR BLD: 0.9 % (ref 0–1)
BILIRUB SERPL-MCNC: 0.4 MG/DL (ref 0.2–1.2)
BUN SERPL-MCNC: 13 MG/DL (ref 6–20)
CALCIUM SERPL-MCNC: 10 MG/DL (ref 8.6–10)
CHLORIDE SERPL-SCNC: 101 MMOL/L (ref 98–107)
CHOLEST SERPL-MCNC: 228 MG/DL (ref 0–199)
CO2 SERPL-SCNC: 23 MMOL/L (ref 22–29)
CREAT SERPL-MCNC: 1.1 MG/DL (ref 0.7–1.2)
EOSINOPHIL # BLD: 0.3 K/UL (ref 0–0.6)
EOSINOPHIL NFR BLD: 3.6 % (ref 0–5)
ERYTHROCYTE [DISTWIDTH] IN BLOOD BY AUTOMATED COUNT: 13.1 % (ref 11.5–14.5)
GLUCOSE SERPL-MCNC: 190 MG/DL (ref 70–99)
HBA1C MFR BLD: 9.2 % (ref 4–5.6)
HCT VFR BLD AUTO: 48.4 % (ref 42–52)
HDLC SERPL-MCNC: 38 MG/DL (ref 40–60)
HGB BLD-MCNC: 15.6 G/DL (ref 14–18)
IMM GRANULOCYTES # BLD: 0 K/UL
LDLC SERPL CALC-MCNC: 149 MG/DL
LYMPHOCYTES # BLD: 2.9 K/UL (ref 1.1–4.5)
LYMPHOCYTES NFR BLD: 33.4 % (ref 20–40)
MCH RBC QN AUTO: 28 PG (ref 27–31)
MCHC RBC AUTO-ENTMCNC: 32.2 G/DL (ref 33–37)
MCV RBC AUTO: 86.7 FL (ref 80–94)
MONOCYTES # BLD: 0.7 K/UL (ref 0–0.9)
MONOCYTES NFR BLD: 8.1 % (ref 0–10)
NEUTROPHILS # BLD: 4.7 K/UL (ref 1.5–7.5)
NEUTS SEG NFR BLD: 53.5 % (ref 50–65)
PLATELET # BLD AUTO: 223 K/UL (ref 130–400)
PMV BLD AUTO: 11.2 FL (ref 9.4–12.4)
POTASSIUM SERPL-SCNC: 4.6 MMOL/L (ref 3.5–5.1)
PROT SERPL-MCNC: 6.7 G/DL (ref 6.4–8.3)
RBC # BLD AUTO: 5.58 M/UL (ref 4.7–6.1)
SODIUM SERPL-SCNC: 141 MMOL/L (ref 136–145)
TRIGL SERPL-MCNC: 207 MG/DL (ref 0–149)
WBC # BLD AUTO: 8.8 K/UL (ref 4.8–10.8)

## 2025-05-27 ENCOUNTER — OFFICE VISIT (OUTPATIENT)
Dept: PAIN MANAGEMENT | Age: 60
End: 2025-05-27
Payer: COMMERCIAL

## 2025-05-27 ENCOUNTER — OFFICE VISIT (OUTPATIENT)
Dept: INTERNAL MEDICINE | Age: 60
End: 2025-05-27
Payer: COMMERCIAL

## 2025-05-27 VITALS
HEART RATE: 82 BPM | OXYGEN SATURATION: 96 % | HEIGHT: 78 IN | SYSTOLIC BLOOD PRESSURE: 135 MMHG | BODY MASS INDEX: 36.45 KG/M2 | WEIGHT: 315 LBS | DIASTOLIC BLOOD PRESSURE: 85 MMHG

## 2025-05-27 VITALS
HEIGHT: 78 IN | RESPIRATION RATE: 18 BRPM | TEMPERATURE: 97.5 F | DIASTOLIC BLOOD PRESSURE: 89 MMHG | WEIGHT: 315 LBS | BODY MASS INDEX: 36.45 KG/M2 | OXYGEN SATURATION: 97 % | SYSTOLIC BLOOD PRESSURE: 134 MMHG | HEART RATE: 67 BPM

## 2025-05-27 DIAGNOSIS — M19.071 PRIMARY OSTEOARTHRITIS OF RIGHT ANKLE: ICD-10-CM

## 2025-05-27 DIAGNOSIS — E11.42 DIABETIC PERIPHERAL NEUROPATHY (HCC): ICD-10-CM

## 2025-05-27 DIAGNOSIS — F11.90 CHRONIC, CONTINUOUS USE OF OPIOIDS: ICD-10-CM

## 2025-05-27 DIAGNOSIS — M47.816 FACET ARTHROPATHY, LUMBAR: ICD-10-CM

## 2025-05-27 DIAGNOSIS — M79.18 MYALGIA, OTHER SITE: ICD-10-CM

## 2025-05-27 DIAGNOSIS — E66.01 MORBID OBESITY DUE TO EXCESS CALORIES (HCC): ICD-10-CM

## 2025-05-27 DIAGNOSIS — G89.29 CHRONIC BILATERAL LOW BACK PAIN WITH BILATERAL SCIATICA: Primary | ICD-10-CM

## 2025-05-27 DIAGNOSIS — M54.42 CHRONIC BILATERAL LOW BACK PAIN WITH BILATERAL SCIATICA: Primary | ICD-10-CM

## 2025-05-27 DIAGNOSIS — E11.00 TYPE 2 DIABETES MELLITUS WITH HYPEROSMOLARITY WITHOUT COMA, UNSPECIFIED WHETHER LONG TERM INSULIN USE (HCC): Primary | ICD-10-CM

## 2025-05-27 DIAGNOSIS — E78.00 HYPERCHOLESTEREMIA: ICD-10-CM

## 2025-05-27 DIAGNOSIS — M54.41 CHRONIC BILATERAL LOW BACK PAIN WITH BILATERAL SCIATICA: Primary | ICD-10-CM

## 2025-05-27 PROCEDURE — 99213 OFFICE O/P EST LOW 20 MIN: CPT

## 2025-05-27 PROCEDURE — 3046F HEMOGLOBIN A1C LEVEL >9.0%: CPT | Performed by: INTERNAL MEDICINE

## 2025-05-27 PROCEDURE — 99214 OFFICE O/P EST MOD 30 MIN: CPT | Performed by: INTERNAL MEDICINE

## 2025-05-27 PROCEDURE — 3046F HEMOGLOBIN A1C LEVEL >9.0%: CPT

## 2025-05-27 ASSESSMENT — ENCOUNTER SYMPTOMS
WHEEZING: 0
SORE THROAT: 0
GASTROINTESTINAL NEGATIVE: 1
ABDOMINAL PAIN: 0
RESPIRATORY NEGATIVE: 1
SHORTNESS OF BREATH: 0
NAUSEA: 0
SINUS PRESSURE: 0
TROUBLE SWALLOWING: 0
ABDOMINAL DISTENTION: 0
BACK PAIN: 1
EYES NEGATIVE: 1
BACK PAIN: 0
VOMITING: 0
EYE DISCHARGE: 0
EYE ITCHING: 0
BLOOD IN STOOL: 0

## 2025-05-27 NOTE — PROGRESS NOTES
needs refills or if his condition worsens so that alternative treatment options can be considered.    Follow-up  The patient will follow up in 3 months.     - Our mission at the [facility] is to improve the lives of our patients by improving function and relieving pain. We have a multidisciplinary outlook with a focus on non-opioid medications, interventions, physical therapy and improved mindset. We strive to improve function and relieve pain with a focus on health and individualizing care.                                                                                                                                      [x] Follow up    [] 4 weeks   [] 6 weeks   [] 8 weeks   [] 10 weeks   [x] 3 months  [] Post procedure to evaluate effectiveness of treatment  [] To evaluate medications changes made at office visit.   [] To review diagnostics ordered at last visit.   [] To evaluate response to therapy    [x] For management of controlled substance  [x] Random UDS as indicated by ORT score or if indicated by abberent behaviors     Discussion: The following care plan was discussed with patient: home exercise program and continuation of current medications  Patient verablized understanding and gave consent to plan of care. Any questions were asked and answered. See discharge instructions.     Sleep Hygiene: Patient educated on the benefits of a good night's sleep with one's physical, emotional, and cognitive well-being. A good night's rest is also beneficial with one's experience with pain, in fact they are closely connected, that can worsen over time. The patient does does show understanding of habits to include in the nightly routine to encourage healthy sleep habits including avoiding bright overhead lights, instead using reduced lighting or lamps as is gets darker at night, avoidance of caffeine at least 4 hours prior to bedtimes, avoiding screen time 1-2 hours before sleep, developing a bedtime

## 2025-05-27 NOTE — PROGRESS NOTES
[facility]  [facility] MEDIC[facility]  [address] [place] [address] [address]IVE  [address]  [place] [address]  Dept: [phone]  Dept Fax: [phone]  Loc: [phone]      Visit Date: 2025    Kenneth Holden a 59 y.o. male who presents today for:  Chief Complaint   Patient presents with    Follow-up         HPI:     Patient is in for follow-up visit with lab work.  He is not having any new problems other than noncompliance with diet and exercise and A1c is back    Past Medical History:   Diagnosis Date    Arthritis     Back pain     Chronic back pain     Diabetes mellitus (HCC)     Erectile dysfunction     Joint pain 2015    Low back pain     Lumbosacral disc disease     Neuropathy     Neuropathy in diabetes (HCC)     Obesity     Seasonal allergies     Spinal stenosis     Type 2 diabetes mellitus without complication ([facility])       Past Surgical History:   Procedure Laterality Date    COLONOSCOPY N/A 04/15/2022    Dr HANS Yee-Apparent submucosal lesion noted ? Lipoma, r/o rule out adenomatous mucosal tissue.in the right colon-AP x 1, HP x 3, BCM x 1, 3 yr recall    CYST REMOVAL      right wrist    ENDOSCOPY, COLON, DIAGNOSTIC      TYMPANOSTOMY TUBE PLACEMENT      UPPER GASTROINTESTINAL ENDOSCOPY         Family History   Problem Relation Age of Onset    Atrial Fibrillation Mother     Heart Disease Mother     High Blood Pressure Mother     Breast Cancer Maternal Aunt     Cancer Maternal Aunt     Heart Attack Paternal Uncle     Mental Illness Father     Cancer Maternal Grandmother     Diabetes Maternal Uncle        Social History     Tobacco Use    Smoking status: Former     Current packs/day: 0.00     Average packs/day: 1.5 packs/day for 18.7 years (28.0 ttl pk-yrs)     Types: Cigarettes     Start date: 1993     Quit date: 2012     Years since quittin.3    Smokeless tobacco: Never   Substance Use Topics    Alcohol use: Not Currently      Current Outpatient

## 2025-06-19 DIAGNOSIS — E11.9 TYPE 2 DIABETES MELLITUS WITHOUT COMPLICATION, WITH LONG-TERM CURRENT USE OF INSULIN (HCC): ICD-10-CM

## 2025-06-19 DIAGNOSIS — Z79.4 TYPE 2 DIABETES MELLITUS WITHOUT COMPLICATION, WITH LONG-TERM CURRENT USE OF INSULIN (HCC): ICD-10-CM

## 2025-06-20 NOTE — TELEPHONE ENCOUNTER
Last OV 5/27/2025  Next OV 9/29/2025      Requested Prescriptions     Pending Prescriptions Disp Refills    metFORMIN (GLUCOPHAGE) 1000 MG tablet [Pharmacy Med Name: METFORMIN HYDROCHLORIDE 1000MG TABLET] 180 tablet 1     Sig: TAKE 1 TABLET BY MOUTH 2 TIMES DAILY (WITH MEALS)

## 2025-06-25 DIAGNOSIS — G89.29 CHRONIC BILATERAL LOW BACK PAIN WITH BILATERAL SCIATICA: ICD-10-CM

## 2025-06-25 DIAGNOSIS — M54.42 CHRONIC BILATERAL LOW BACK PAIN WITH BILATERAL SCIATICA: ICD-10-CM

## 2025-06-25 DIAGNOSIS — M54.41 CHRONIC BILATERAL LOW BACK PAIN WITH BILATERAL SCIATICA: ICD-10-CM

## 2025-06-25 RX ORDER — HYDROCODONE BITARTRATE AND ACETAMINOPHEN 5; 325 MG/1; MG/1
1 TABLET ORAL EVERY 12 HOURS PRN
Qty: 45 TABLET | Refills: 0 | Status: SHIPPED | OUTPATIENT
Start: 2025-06-25 | End: 2025-07-25

## 2025-06-25 NOTE — TELEPHONE ENCOUNTER
Last seen in office visit: 05/27/25  Next scheduled office visit: 08/20/25  Last UDS results: 04/07/25  Any discrepancies on Eriberto (such as refills from another provider): no

## 2025-07-28 DIAGNOSIS — G89.29 CHRONIC BILATERAL LOW BACK PAIN WITH BILATERAL SCIATICA: ICD-10-CM

## 2025-07-28 DIAGNOSIS — M54.41 CHRONIC BILATERAL LOW BACK PAIN WITH BILATERAL SCIATICA: ICD-10-CM

## 2025-07-28 DIAGNOSIS — M54.42 CHRONIC BILATERAL LOW BACK PAIN WITH BILATERAL SCIATICA: ICD-10-CM

## 2025-07-28 RX ORDER — HYDROCODONE BITARTRATE AND ACETAMINOPHEN 5; 325 MG/1; MG/1
1 TABLET ORAL EVERY 12 HOURS PRN
Qty: 45 TABLET | Refills: 0 | Status: SHIPPED | OUTPATIENT
Start: 2025-08-09 | End: 2025-09-08

## 2025-07-28 NOTE — TELEPHONE ENCOUNTER
Last seen in office visit: 05/27/25  Next scheduled office visit: 08/27/25  Last UDS results: 04/07/25  Any discrepancies on Eriberto (such as refills from another provider): no

## 2025-08-04 DIAGNOSIS — M54.42 CHRONIC BILATERAL LOW BACK PAIN WITH BILATERAL SCIATICA: ICD-10-CM

## 2025-08-04 DIAGNOSIS — G89.29 CHRONIC BILATERAL LOW BACK PAIN WITH BILATERAL SCIATICA: ICD-10-CM

## 2025-08-04 DIAGNOSIS — M54.41 CHRONIC BILATERAL LOW BACK PAIN WITH BILATERAL SCIATICA: ICD-10-CM

## 2025-08-04 RX ORDER — HYDROCODONE BITARTRATE AND ACETAMINOPHEN 5; 325 MG/1; MG/1
1 TABLET ORAL EVERY 12 HOURS PRN
Qty: 45 TABLET | Refills: 0 | Status: SHIPPED | OUTPATIENT
Start: 2025-08-04 | End: 2025-09-03

## 2025-08-25 DIAGNOSIS — M19.079 OSTEOARTHRITIS OF ANKLE, UNSPECIFIED LATERALITY, UNSPECIFIED OSTEOARTHRITIS TYPE: ICD-10-CM

## 2025-08-25 RX ORDER — DICLOFENAC SODIUM 75 MG/1
75 TABLET, DELAYED RELEASE ORAL 2 TIMES DAILY
Qty: 60 TABLET | Refills: 2 | Status: SHIPPED | OUTPATIENT
Start: 2025-08-25 | End: 2025-11-23

## 2025-09-02 ENCOUNTER — OFFICE VISIT (OUTPATIENT)
Dept: PAIN MANAGEMENT | Age: 60
End: 2025-09-02
Payer: COMMERCIAL

## 2025-09-02 ENCOUNTER — HOSPITAL ENCOUNTER (OUTPATIENT)
Dept: GENERAL RADIOLOGY | Age: 60
Discharge: HOME | End: 2025-09-02
Payer: COMMERCIAL

## 2025-09-02 VITALS
BODY MASS INDEX: 36.45 KG/M2 | HEIGHT: 78 IN | WEIGHT: 315 LBS | TEMPERATURE: 97.5 F | DIASTOLIC BLOOD PRESSURE: 85 MMHG | OXYGEN SATURATION: 94 % | SYSTOLIC BLOOD PRESSURE: 137 MMHG | HEART RATE: 64 BPM

## 2025-09-02 DIAGNOSIS — F11.90 CHRONIC, CONTINUOUS USE OF OPIOIDS: ICD-10-CM

## 2025-09-02 DIAGNOSIS — E11.42 DIABETIC PERIPHERAL NEUROPATHY (HCC): ICD-10-CM

## 2025-09-02 DIAGNOSIS — G89.29 CHRONIC PAIN OF BOTH KNEES: ICD-10-CM

## 2025-09-02 DIAGNOSIS — E11.00 TYPE 2 DIABETES MELLITUS WITH HYPEROSMOLARITY WITHOUT COMA, WITHOUT LONG-TERM CURRENT USE OF INSULIN (HCC): ICD-10-CM

## 2025-09-02 DIAGNOSIS — M25.562 CHRONIC PAIN OF BOTH KNEES: ICD-10-CM

## 2025-09-02 DIAGNOSIS — M25.561 CHRONIC PAIN OF BOTH KNEES: ICD-10-CM

## 2025-09-02 DIAGNOSIS — M54.41 CHRONIC BILATERAL LOW BACK PAIN WITH BILATERAL SCIATICA: Primary | ICD-10-CM

## 2025-09-02 DIAGNOSIS — G89.29 CHRONIC BILATERAL LOW BACK PAIN WITH BILATERAL SCIATICA: Primary | ICD-10-CM

## 2025-09-02 DIAGNOSIS — M47.816 FACET ARTHROPATHY, LUMBAR: ICD-10-CM

## 2025-09-02 DIAGNOSIS — E66.812 CLASS 2 DRUG-INDUCED OBESITY WITH SERIOUS COMORBIDITY AND BODY MASS INDEX (BMI) OF 39.0 TO 39.9 IN ADULT: ICD-10-CM

## 2025-09-02 DIAGNOSIS — M54.42 CHRONIC BILATERAL LOW BACK PAIN WITH BILATERAL SCIATICA: Primary | ICD-10-CM

## 2025-09-02 DIAGNOSIS — E66.1 CLASS 2 DRUG-INDUCED OBESITY WITH SERIOUS COMORBIDITY AND BODY MASS INDEX (BMI) OF 39.0 TO 39.9 IN ADULT: ICD-10-CM

## 2025-09-02 DIAGNOSIS — M54.16 LUMBAR RADICULOPATHY: ICD-10-CM

## 2025-09-02 PROCEDURE — 3046F HEMOGLOBIN A1C LEVEL >9.0%: CPT

## 2025-09-02 PROCEDURE — 73560 X-RAY EXAM OF KNEE 1 OR 2: CPT

## 2025-09-02 PROCEDURE — 99214 OFFICE O/P EST MOD 30 MIN: CPT

## 2025-09-02 RX ORDER — ALBUTEROL SULFATE 90 UG/1
INHALANT RESPIRATORY (INHALATION)
COMMUNITY
Start: 2025-07-11

## 2025-09-02 RX ORDER — HYDROCODONE BITARTRATE AND ACETAMINOPHEN 5; 325 MG/1; MG/1
1 TABLET ORAL EVERY 12 HOURS PRN
Qty: 45 TABLET | Refills: 0 | Status: SHIPPED | OUTPATIENT
Start: 2025-09-03 | End: 2025-10-03

## 2025-09-02 RX ORDER — EPINEPHRINE 0.3 MG/.3ML
INJECTION SUBCUTANEOUS
COMMUNITY
Start: 2025-07-11

## 2025-09-02 ASSESSMENT — ENCOUNTER SYMPTOMS
GASTROINTESTINAL NEGATIVE: 1
RESPIRATORY NEGATIVE: 1
BACK PAIN: 1
EYES NEGATIVE: 1
BOWEL INCONTINENCE: 0

## (undated) DEVICE — GLOVE ORTHO 8   MSG9480

## (undated) DEVICE — Z DUPLICATE USE 2738952 SYSTEM VENT M AD NSL PAP DEV HD STRP 2L HYPRINFL BG MRI

## (undated) DEVICE — FORCEPS BX L240CM JAW DIA2.4MM ORNG L CAP W/ NDL DISP RAD

## (undated) DEVICE — ENDO KIT,LOURDES HOSPITAL: Brand: MEDLINE INDUSTRIES, INC.